# Patient Record
Sex: FEMALE | Race: WHITE | NOT HISPANIC OR LATINO | Employment: PART TIME | URBAN - METROPOLITAN AREA
[De-identification: names, ages, dates, MRNs, and addresses within clinical notes are randomized per-mention and may not be internally consistent; named-entity substitution may affect disease eponyms.]

---

## 2017-02-09 ENCOUNTER — HISTORICAL (OUTPATIENT)
Dept: URGENT CARE | Facility: CLINIC | Age: 32
End: 2017-02-09

## 2017-10-09 ENCOUNTER — HISTORICAL (OUTPATIENT)
Dept: LAB | Facility: HOSPITAL | Age: 32
End: 2017-10-09

## 2018-08-13 ENCOUNTER — HISTORICAL (OUTPATIENT)
Dept: URGENT CARE | Facility: CLINIC | Age: 33
End: 2018-08-13

## 2018-08-16 ENCOUNTER — HISTORICAL (OUTPATIENT)
Dept: RADIOLOGY | Facility: HOSPITAL | Age: 33
End: 2018-08-16

## 2018-10-09 ENCOUNTER — HISTORICAL (OUTPATIENT)
Dept: LAB | Facility: HOSPITAL | Age: 33
End: 2018-10-09

## 2018-10-15 ENCOUNTER — HISTORICAL (OUTPATIENT)
Dept: RADIOLOGY | Facility: HOSPITAL | Age: 33
End: 2018-10-15

## 2018-10-25 ENCOUNTER — HISTORICAL (OUTPATIENT)
Dept: ADMINISTRATIVE | Facility: HOSPITAL | Age: 33
End: 2018-10-25

## 2018-10-25 LAB
ABS NEUT (OLG): 6.99 X10(3)/MCL (ref 2.1–9.2)
ALBUMIN SERPL-MCNC: 4 GM/DL (ref 3.4–5)
ALBUMIN/GLOB SERPL: 1.1 RATIO (ref 1.1–2)
ALP SERPL-CCNC: 92 UNIT/L (ref 38–126)
ALT SERPL-CCNC: 59 UNIT/L (ref 12–78)
AST SERPL-CCNC: 26 UNIT/L (ref 15–37)
BASOPHILS # BLD AUTO: 0 X10(3)/MCL (ref 0–0.2)
BASOPHILS NFR BLD AUTO: 0 %
BILIRUB SERPL-MCNC: 0.6 MG/DL (ref 0.2–1)
BILIRUBIN DIRECT+TOT PNL SERPL-MCNC: 0.1 MG/DL (ref 0–0.5)
BILIRUBIN DIRECT+TOT PNL SERPL-MCNC: 0.5 MG/DL (ref 0–0.8)
BUN SERPL-MCNC: 18 MG/DL (ref 7–18)
CALCIUM SERPL-MCNC: 9.7 MG/DL (ref 8.5–10.1)
CHLORIDE SERPL-SCNC: 104 MMOL/L (ref 98–107)
CO2 SERPL-SCNC: 29 MMOL/L (ref 21–32)
CREAT SERPL-MCNC: 0.89 MG/DL (ref 0.55–1.02)
CRP SERPL HS-MCNC: 10 MG/L (ref 0–3)
EOSINOPHIL # BLD AUTO: 0.6 X10(3)/MCL (ref 0–0.9)
EOSINOPHIL NFR BLD AUTO: 5 %
ERYTHROCYTE [DISTWIDTH] IN BLOOD BY AUTOMATED COUNT: 14.6 % (ref 11.5–17)
GLOBULIN SER-MCNC: 3.8 GM/DL (ref 2.4–3.5)
GLUCOSE SERPL-MCNC: 141 MG/DL (ref 74–106)
HCT VFR BLD AUTO: 40.5 % (ref 37–47)
HGB BLD-MCNC: 12.9 GM/DL (ref 12–16)
LYMPHOCYTES # BLD AUTO: 2.2 X10(3)/MCL (ref 0.6–4.6)
LYMPHOCYTES NFR BLD AUTO: 20 %
MCH RBC QN AUTO: 25.9 PG (ref 27–31)
MCHC RBC AUTO-ENTMCNC: 31.9 GM/DL (ref 33–36)
MCV RBC AUTO: 81.2 FL (ref 80–94)
MONOCYTES # BLD AUTO: 0.9 X10(3)/MCL (ref 0.1–1.3)
MONOCYTES NFR BLD AUTO: 8 %
NEUTROPHILS # BLD AUTO: 6.99 X10(3)/MCL (ref 2.1–9.2)
NEUTROPHILS NFR BLD AUTO: 66 %
PLATELET # BLD AUTO: 156 X10(3)/MCL (ref 130–400)
PMV BLD AUTO: 12.1 FL (ref 9.4–12.4)
POTASSIUM SERPL-SCNC: 4.4 MMOL/L (ref 3.5–5.1)
PROT SERPL-MCNC: 7.8 GM/DL (ref 6.4–8.2)
RBC # BLD AUTO: 4.99 X10(6)/MCL (ref 4.2–5.4)
SODIUM SERPL-SCNC: 141 MMOL/L (ref 136–145)
WBC # SPEC AUTO: 10.7 X10(3)/MCL (ref 4.5–11.5)

## 2018-11-02 ENCOUNTER — HISTORICAL (OUTPATIENT)
Dept: URGENT CARE | Facility: CLINIC | Age: 33
End: 2018-11-02

## 2018-11-02 LAB
BILIRUB SERPL-MCNC: NEGATIVE MG/DL
BLOOD URINE, POC: NEGATIVE
CLARITY, POC UA: NORMAL
COLOR, POC UA: YELLOW
GLUCOSE UR QL STRIP: NEGATIVE
KETONES UR QL STRIP: NEGATIVE
LEUKOCYTE EST, POC UA: NORMAL
NITRITE, POC UA: NEGATIVE
PH, POC UA: 6
PROTEIN, POC: NEGATIVE
SPECIFIC GRAVITY, POC UA: 1.01
UROBILINOGEN, POC UA: NORMAL

## 2018-12-18 ENCOUNTER — HISTORICAL (OUTPATIENT)
Dept: ANESTHESIOLOGY | Facility: HOSPITAL | Age: 33
End: 2018-12-18

## 2018-12-26 LAB
BILIRUB SERPL-MCNC: NEGATIVE MG/DL
BLOOD URINE, POC: NEGATIVE
CLARITY, POC UA: CLEAR
COLOR, POC UA: YELLOW
GLUCOSE UR QL STRIP: NEGATIVE
KETONES UR QL STRIP: NEGATIVE
LEUKOCYTE EST, POC UA: NEGATIVE
NITRITE, POC UA: NEGATIVE
PH, POC UA: 5
PROTEIN, POC: NEGATIVE
SPECIFIC GRAVITY, POC UA: 1.01
UROBILINOGEN, POC UA: NORMAL

## 2019-01-07 ENCOUNTER — HISTORICAL (OUTPATIENT)
Dept: PREADMISSION TESTING | Facility: HOSPITAL | Age: 34
End: 2019-01-07

## 2019-01-17 LAB
BUN SERPL-MCNC: 16 MG/DL (ref 7–18)
CALCIUM SERPL-MCNC: 9.4 MG/DL (ref 8.5–10.1)
CHLORIDE SERPL-SCNC: 106 MMOL/L (ref 98–107)
CO2 SERPL-SCNC: 27 MMOL/L (ref 21–32)
CREAT SERPL-MCNC: 0.81 MG/DL (ref 0.55–1.02)
CREAT/UREA NIT SERPL: 19.8
ERYTHROCYTE [DISTWIDTH] IN BLOOD BY AUTOMATED COUNT: 13.7 % (ref 11.5–17)
GLUCOSE SERPL-MCNC: 153 MG/DL (ref 74–106)
HCT VFR BLD AUTO: 37.5 % (ref 37–47)
HGB BLD-MCNC: 12.1 GM/DL (ref 12–16)
MCH RBC QN AUTO: 26.6 PG (ref 27–31)
MCHC RBC AUTO-ENTMCNC: 32.3 GM/DL (ref 33–36)
MCV RBC AUTO: 82.4 FL (ref 80–94)
PLATELET # BLD AUTO: 218 X10(3)/MCL (ref 130–400)
PMV BLD AUTO: 11.6 FL (ref 9.4–12.4)
POTASSIUM SERPL-SCNC: 4.1 MMOL/L (ref 3.5–5.1)
RBC # BLD AUTO: 4.55 X10(6)/MCL (ref 4.2–5.4)
SODIUM SERPL-SCNC: 140 MMOL/L (ref 136–145)
WBC # SPEC AUTO: 7.4 X10(3)/MCL (ref 4.5–11.5)

## 2019-01-18 ENCOUNTER — HISTORICAL (OUTPATIENT)
Dept: ADMINISTRATIVE | Facility: HOSPITAL | Age: 34
End: 2019-01-18

## 2019-02-18 ENCOUNTER — HISTORICAL (OUTPATIENT)
Dept: ADMINISTRATIVE | Facility: HOSPITAL | Age: 34
End: 2019-02-18

## 2019-04-13 ENCOUNTER — HISTORICAL (OUTPATIENT)
Dept: URGENT CARE | Facility: CLINIC | Age: 34
End: 2019-04-13

## 2019-07-08 ENCOUNTER — HISTORICAL (OUTPATIENT)
Dept: RADIOLOGY | Facility: HOSPITAL | Age: 34
End: 2019-07-08

## 2019-07-23 ENCOUNTER — HISTORICAL (OUTPATIENT)
Dept: LAB | Facility: HOSPITAL | Age: 34
End: 2019-07-23

## 2021-01-23 ENCOUNTER — HISTORICAL (OUTPATIENT)
Dept: ADMINISTRATIVE | Facility: HOSPITAL | Age: 36
End: 2021-01-23

## 2021-02-04 ENCOUNTER — HISTORICAL (OUTPATIENT)
Dept: ADMINISTRATIVE | Facility: HOSPITAL | Age: 36
End: 2021-02-04

## 2021-07-12 ENCOUNTER — HISTORICAL (OUTPATIENT)
Dept: RADIOLOGY | Facility: HOSPITAL | Age: 36
End: 2021-07-12

## 2021-07-14 ENCOUNTER — HISTORICAL (OUTPATIENT)
Dept: FAMILY MEDICINE | Facility: CLINIC | Age: 36
End: 2021-07-14

## 2021-07-18 LAB
LEFT EYE DM RETINOPATHY: NEGATIVE
RIGHT EYE DM RETINOPATHY: NEGATIVE

## 2021-08-05 ENCOUNTER — HISTORICAL (OUTPATIENT)
Dept: RADIOLOGY | Facility: HOSPITAL | Age: 36
End: 2021-08-05

## 2021-08-20 ENCOUNTER — HISTORICAL (OUTPATIENT)
Dept: RADIOLOGY | Facility: HOSPITAL | Age: 36
End: 2021-08-20

## 2021-08-20 ENCOUNTER — HISTORICAL (OUTPATIENT)
Dept: ADMINISTRATIVE | Facility: HOSPITAL | Age: 36
End: 2021-08-20

## 2021-08-23 ENCOUNTER — HOSPITAL ENCOUNTER (OUTPATIENT)
Dept: MEDSURG UNIT | Facility: HOSPITAL | Age: 36
End: 2021-08-24
Attending: OTOLARYNGOLOGY | Admitting: OTOLARYNGOLOGY

## 2021-09-21 ENCOUNTER — HISTORICAL (OUTPATIENT)
Dept: ADMINISTRATIVE | Facility: HOSPITAL | Age: 36
End: 2021-09-21

## 2021-10-07 ENCOUNTER — HISTORICAL (OUTPATIENT)
Dept: SPEECH THERAPY | Facility: HOSPITAL | Age: 36
End: 2021-10-07

## 2021-11-03 ENCOUNTER — HISTORICAL (OUTPATIENT)
Dept: ADMINISTRATIVE | Facility: HOSPITAL | Age: 36
End: 2021-11-03

## 2021-11-04 ENCOUNTER — HISTORICAL (OUTPATIENT)
Dept: FAMILY MEDICINE | Facility: CLINIC | Age: 36
End: 2021-11-04

## 2021-12-10 ENCOUNTER — HISTORICAL (OUTPATIENT)
Dept: LAB | Facility: HOSPITAL | Age: 36
End: 2021-12-10

## 2021-12-20 ENCOUNTER — HISTORICAL (OUTPATIENT)
Dept: ADMINISTRATIVE | Facility: HOSPITAL | Age: 36
End: 2021-12-20

## 2021-12-21 ENCOUNTER — HISTORICAL (OUTPATIENT)
Dept: SURGERY | Facility: HOSPITAL | Age: 36
End: 2021-12-21

## 2022-01-23 ENCOUNTER — HISTORICAL (OUTPATIENT)
Dept: ADMINISTRATIVE | Facility: HOSPITAL | Age: 37
End: 2022-01-23

## 2022-01-23 LAB
FLUAV AG UPPER RESP QL IA.RAPID: NEGATIVE
FLUBV AG UPPER RESP QL IA.RAPID: NEGATIVE
SARS-COV-2 RNA RESP QL NAA+PROBE: POSITIVE

## 2022-03-21 ENCOUNTER — HISTORICAL (OUTPATIENT)
Dept: OTOLARYNGOLOGY | Facility: CLINIC | Age: 37
End: 2022-03-21

## 2022-03-21 LAB
ABS NEUT (OLG): 8.18 (ref 2.1–9.2)
ALBUMIN SERPL-MCNC: 4.1 G/DL (ref 3.5–5)
ALBUMIN/GLOB SERPL: 1.1 {RATIO} (ref 1.1–2)
ALP SERPL-CCNC: 65 U/L (ref 40–150)
ALT SERPL-CCNC: 18 U/L (ref 0–55)
AST SERPL-CCNC: 15 U/L (ref 5–34)
BASOPHILS # BLD AUTO: 0 10*3/UL (ref 0–0.2)
BASOPHILS NFR BLD AUTO: 0 %
BILIRUB SERPL-MCNC: 0.4 MG/DL
BILIRUBIN DIRECT+TOT PNL SERPL-MCNC: 0.1 (ref 0–0.5)
BILIRUBIN DIRECT+TOT PNL SERPL-MCNC: 0.3 (ref 0–0.8)
BUN SERPL-MCNC: 15.2 MG/DL (ref 7–18.7)
CALCIUM SERPL-MCNC: 9.7 MG/DL (ref 8.7–10.5)
CHLORIDE SERPL-SCNC: 103 MMOL/L (ref 98–107)
CO2 SERPL-SCNC: 27 MMOL/L (ref 22–29)
CREAT SERPL-MCNC: 0.87 MG/DL (ref 0.55–1.02)
EOSINOPHIL # BLD AUTO: 0.1 10*3/UL (ref 0–0.9)
EOSINOPHIL NFR BLD AUTO: 1 %
ERYTHROCYTE [DISTWIDTH] IN BLOOD BY AUTOMATED COUNT: 13.7 % (ref 11.5–14.5)
FLAG2 (OHS): 60
FLAG3 (OHS): 80
FLAGS (OHS): 80
GLOBULIN SER-MCNC: 3.8 G/DL (ref 2.4–3.5)
GLUCOSE SERPL-MCNC: 141 MG/DL (ref 74–100)
HCT VFR BLD AUTO: 44.3 % (ref 35–46)
HEMOLYSIS INTERF INDEX SERPL-ACNC: 4
HGB BLD-MCNC: 14.5 G/DL (ref 12–16)
ICTERIC INTERF INDEX SERPL-ACNC: 1
IMM GRANULOCYTES # BLD AUTO: 0.03 10*3/UL
IMM GRANULOCYTES NFR BLD AUTO: 0 %
LIPEMIC INTERF INDEX SERPL-ACNC: 30
LOW EVENT # SUSPECT FLAG (OHS): 80
LYMPHOCYTES # BLD AUTO: 3 10*3/UL (ref 0.6–4.6)
LYMPHOCYTES NFR BLD AUTO: 25 %
MANUAL DIFF? (OHS): NO
MCH RBC QN AUTO: 27.5 PG (ref 26–34)
MCHC RBC AUTO-ENTMCNC: 32.7 G/DL (ref 31–37)
MCV RBC AUTO: 84.1 FL (ref 80–100)
MO BLASTS SUSPECT FLAG (OHS): 40
MONOCYTES # BLD AUTO: 0.6 10*3/UL (ref 0.1–1.3)
MONOCYTES NFR BLD AUTO: 5 %
NEUTROPHILS # BLD AUTO: 8.18 10*3/UL (ref 2.1–9.2)
NEUTROPHILS NFR BLD AUTO: 68 %
NRBC BLD AUTO-RTO: 0 % (ref 0–0.2)
PLATELET # BLD AUTO: 214 10*3/UL (ref 130–400)
PLATELET CLUMPS SUSPECT FLAG (OHS): 10
PMV BLD AUTO: 11.5 FL (ref 7.4–10.4)
POTASSIUM SERPL-SCNC: 4 MMOL/L (ref 3.5–5.1)
PROT SERPL-MCNC: 7.9 G/DL (ref 6.4–8.3)
RBC # BLD AUTO: 5.27 10*6/UL (ref 4–5.2)
SODIUM SERPL-SCNC: 139 MMOL/L (ref 136–145)
T4 FREE SERPL-MCNC: 0.99 NG/DL (ref 0.7–1.48)
TSH SERPL-ACNC: 0.33 M[IU]/L (ref 0.35–4.94)
WBC # SPEC AUTO: 11.9 10*3/UL (ref 4.5–11)

## 2022-04-07 ENCOUNTER — HISTORICAL (OUTPATIENT)
Dept: ADMINISTRATIVE | Facility: HOSPITAL | Age: 37
End: 2022-04-07
Payer: MEDICAID

## 2022-04-24 VITALS
WEIGHT: 200.63 LBS | HEIGHT: 64 IN | BODY MASS INDEX: 34.25 KG/M2 | OXYGEN SATURATION: 99 % | SYSTOLIC BLOOD PRESSURE: 118 MMHG | DIASTOLIC BLOOD PRESSURE: 81 MMHG

## 2022-04-28 NOTE — H&P
Patient:   Kinza Ford            MRN: [id number]            FIN: [id number]               Age:   36 years     Sex:  Female     :  1985   Associated Diagnoses:   None   Author:   Akua Bernal MD      Pre-Op Dx:  36 F with AUB, fibroid    Plan: TVH BS cysto    Reviewed resident's H&P.  Agree with plan.  Proceed with case

## 2022-04-28 NOTE — OP NOTE
DATE OF SURGERY:    01/18/2019    SURGEON:  Lionel Khalil MD    PREOPERATIVE DIAGNOSIS:  Left renal calculus.    POSTOPERATIVE DIAGNOSIS:  Left renal calculus.    PROCEDURE PERFORMED:  Left extracorporeal shockwave lithotripsy.    PROCEDURE IN DETAIL:  After informed consent was obtained, the patient was taken to the operating room after receiving preoperative dose of antibiotics.  She was given a spinal anesthetic and placed in a supine position on the lithotripsy table.  Her stone was identified in 2 planes using fluoroscopy.  Next, she received extracorporeal shock wave lithotripsy which included a total of 2500 shocks up to a power level of 5, which was well tolerated.  There were no apparent complications.  Postprocedure fluoroscopy shows good stone fragmentation.  She was then brought to recovery room in good condition.        ______________________________  Lionel Khalil MD    WBR/[facility]  DD:  01/18/2019  Time:  07:36AM  DT:  01/18/2019  Time:  09:04AM  Job #:  [id number]

## 2022-05-01 NOTE — HISTORICAL [facility] CERNER
This is a historical note converted from [employer]ner. Formatting and pictures may have been removed.  Please reference [employer]ner for original formatting and attached multimedia. Indication for Surgery  37 yo with AUB refractory to medical management, as well as dysmenorrhea, uterine fibroid.  Preoperative Diagnosis  1. Abnormal uterine bleeding  2. Uterine fibroid  3. Dysmenorrhea  Postoperative Diagnosis  Same  Operation  Total vaginal hysterectomy, modified McCalls culdoplasty, cystoscopy  Surgeon(s)  Attending: Akua Bernal MD  Resident: Cindy Moss MD (-IV)  Assistant  Williams Reeves MD (-III)  Anesthesia  General endotracheal  Estimated Blood Loss  20 mL  Urine Output  210 mL  Findings  EUA: external female genitalia without lesion, multiparous cervix without lesion, vaginal epithelium without lesion, uterus 10 cm with excellent descent and mobility, no adnexal fullness  Intraop: uterus with 4 cm?firm, exophytic mass consistent with fibroid posterolateral to internal cervical os on the right; bilateral ovaries normal in appearance; unable to visualize remaining fallopian tube  Cystoscopy: brisk efflux of urine from bilateral ureteral orifices; bladder survey performed with no evidence of injury, defect, or suture; Hunners ulcers noted throughout bladder epithelium, no other lesions  Specimen(s)  Uterus, cervix (109g)  Complications  None  Technique  Patient was taken to the OR with IV fluids running. General anesthesia was obtained without difficulty.?Ancef?2g?was administered for infection prophylaxis. SCDs were placed on bilateral lower extremities and patient received subcutaneous heparin?for DVT prophylaxis. She was positioned in dorsal lithotomy position with feet in Yellowfin stirrups. Exam under anesthesia was performed. She was prepared in draped in the normal sterile fashion. A time out was performed.?A Benz catheter was placed to drain the bladder.  ?   Right angle retractor placed superiorly  with short weighted speculum into the posterior vagina. The cervix was visualized and grasped with two Sue thyroid clamps along the anterior?and posterior lip of the cervix. The cervicovaginal junction was visualized. Vasopressin (20 units diluted in 30 mL normal saline) was then circumferentially injected into the cervix and massaged. A circumferential?incision was made?along the?cervicovaginal junction?with?a?#10?blade scalpel.?With use of a ray-elsi sponge,?gentle blunt dissection was then performed to gently push back vaginal epithelium.?Posterior colpotomy made sharply with curved Mayos without difficulty. Following entry, the posterior cul de sac was digitally explored with no injury or?lesions?noted. 4 cm firm, exophytic uterine mass palpable posterolateral to cervix on the right at the level of the internal os.?Return of clear peritoneal fluid was noted.?One interrupted suture of #0 Vicryl was placed to affix the posterior peritoneum to the posterior vaginal mucosa and suture tied to long weighted speculum. The bilateral uterosacral ligaments were identified and clamped with curved Vasile clamp,?cut, suture ligated with 0-Vicryl.?Sutures were kept long and clamped with curved hemostats for use for traction throughout the case. Attention was then turned to the anterior. Careful sharp dissection performed in the pubocervical fascia but unable to enter peritoneum. The bilateral?cardinal ligaments were then sequentially clamped,?desiccated, and transected using the Enseal device.?Attention then turned back to anterior peritoneum and?entered successfully with Metzenbaum scissors. Anterior cul?de sac evaluated with no evidence of injury. At this time the aforementioned uterine mass was visible and appearance noted to be consistent with a fibroid. Uterine vessels noted to be inserting at level of internal cervical os which was superior to the fibroid. The bilateral uterine vessels were then clamped,?desiccated, and  transected with the Enseal device. Portion of the broad ligament bilaterally also clamped, desiccated, and transected with the Enseal.  ?   At this point in case,?we had inadequate visualized of cornual structures to be able to safely access.?Posterior aspect of uterine fundus grasped with Sue clamp and delivered through the vaginal introitus as cervix pushed anteriorly into peritoneal cavity. Cornual structures were then identified and the Enseal device was used to clamp, desiccate, and transect first the uteroovarian ligament on the right. Enseal device then used to clamp, desiccate remaining cornual structures on the right. Attention then turned to the left where cornual structures were?also clamped, desiccated, and transected with the Enseal?device. Uterus and cervix then removed and sent for pathology.?Pedicles then examined and small amount of bleeding noted from level of right uterine pedicle. Area grasped with Allis clamp and figure of eight placed with 0-Vicryl. Hemostasis then noted. We then attempted salpingectomy but were unable to visualize patients remaining fallopian tube. Pedicles again examined and area of bleeding noted at level of uterine pedicle on left. Grasped with Allis clamp and figure of eight placed using 0-Vicryl. Small amount of bleeding still noted and hemostasis was achieved with the Enseal device. Pedicles then examined again and noted to be hemostatic.  ?   Attention was then turned to the modified McCalls culdoplasty and vaginal cuff closure. A right angle retractor was placed anteriorly along with a short weighted metal speculum for visualization of the posterior vaginal cuff.?With use of a?Mcadams needle each individual 0 Vicryl suture on?the right uterosacral ligament was used to incorporate the vaginal cuff including the peritoneum?posteriorly to pexy the lateral cuff edge to the ipsilateral uterosacral ligament. This was repeated on the left side, with Mcadams needle used to pexy  the left lateral cuff edge to the ipsilateral uterosacral ligament. The remainder of the vaginal cuff was closed with?multiple figure of eight sutures using 0-Vicryl. The tagged uterosacral suture was then tied?with?its ipsilateral suture in order to elevate the vaginal apical support?at the level of the uterosacral ligaments bilaterally. Hemostasis with reapproximation confirmed. No visible or palpable defects were?noted.  ?  Cystoscopy was then performed. The cystoscope was introduced into the bladder without difficulty. There was no noted trauma or injury on bladder survey. There was brisk efflux of urine from bilateral ureteral orifices. Bladder was partially emptied and all instruments removed. All instruments removed from the vagina. The patient tolerated the procedure well. ?The needle, sponge and instrument counts were correct x2. Patient went to the recovery?room in stable condition.?  Dr. Bernal was scrubbed and present for the entire procedure.  ?  Cindy Moss MD  [facility], PGY-4  ?  I was present with the resident during all critical and key portions of the procedure and agree with the findings documented in the residents note.  Dr. JILL Bernal MD

## 2022-05-01 NOTE — HISTORICAL [facility] CERNER
This is a historical note converted from [employer]ner. Formatting and pictures may have been removed.  Please reference [employer]ner for original formatting and attached multimedia. Admit and Discharge Dates  Admit Date: 08/23/2021  Discharge Date: 08/24/2021  Physicians  Attending Physician - Narciso SOSA, Ced MARTINEZ  Admitting Physician - Wilner Bryan  Primary Care Physician - Fely Ko  Discharge Diagnosis  Papillary thyroid cancer  Surgical Procedures  08/23/2021 - [id number] - Thyroidectomy (ENT)  Immunizations  No immunizations recorded for this visit.  Admission Information  35-year-old female with history of left thyroid nodule FNA positive for papillary. ?On CT noted to have nodules on the right side as well.? Patient is single and decision was made to undergo total thyroidectomy.? On postop day one?patient?pain was controlled with oral regimen complaining of minimal?sore throat. ?Denied any changes to her voice and felt like it was normal.? However she did complain of right shoulder pain that was likely secondary to positioning. ?This was discussed in depth with  and wife?about origin of shoulder pain and duration of course.? Patient also with good appetite and tolerating diet.??On postop labs PTH?was 125,?calcium?was between 8.9 and nine. ?Denies any numbness or tingling around her lips or her fingers. ?Synthroid was started.??Patient discharged home on postop?day one?with Synthroid?and Norco?7.5.? Also recommend lidocaine patches?for right shoulder. Julio in place with appropriate drainage, to be removed in 2 days  Significant Findings  None  Time Spent on discharge  <30min  Objective  Vitals & Measurements  T:?36.3? ?C (Oral)? TMIN:?36.2? ?C (Temporal Artery)? TMAX:?37.9? ?C (Temporal Artery)? HR:?67(Peripheral)? RR:?18? BP:?148/91? SpO2:?99%?  Physical Exam  General: AAO NAD, strong but slightly rough  Neuro: CN II - XII grossly intact  Head/ Face: AT NC, symmetric, sensations intact  bilaterally  Eye: EOMI PERRLA  Ears: externally normal with grossly normal hearing  Nose: bilateral nares patent  OC/OP: MMM, dentition is moderate  Neck: soft, supple, no LAD, normal ROM, incision c/d/i with steris in place. LORENA with sanguinous drainage  Respiratory: nonlabored, no stridor  Cardiovascular: RRR  MSK: Good range of active and passive R shoulder although with pain, good  strength  Patient Discharge Condition  Good  Discharge Disposition  Good   Discharge Medication Reconciliation  Discharge Med Rec is not complete  Car Seat Challenge  No Qualifying Data

## 2022-05-01 NOTE — HISTORICAL [facility] CERNER
This is a historical note converted from [employer]. Formatting and pictures may have been removed.  Please reference Hanane for original formatting and attached multimedia. H&P Interval?Update  ?   Ms. Ford?presents for scheduled TVH, BS, cystoscopy  ?   She is able to describe planned procedure in her own words.  No new medical problems or medications.  ?   After procedure we have her permission to contact her  Adam?at [phone]  ?  ?Event Name? Event Result? Date/Time?   Temperature Oral 36.5 DegC 21 08:03:54   Peripheral Pulse Rate 80 bpm 21 08:03:54   SpO2 96 % 21 08:03:54   Systolic Blood Pressure 119 mmHg 21 08:03:54   Diastolic Blood Pressure 83 mmHg 21 08:03:54   Mean Arterial Pressure, Cuff 95 mmHg 21 08:03:54   ?  ?  ?   GEN: NAD, A&O  Card: RRR  Pulm: CTAB  Abdomen: Soft, non-tender, no rebound or guarding  Ext: Calves?non-tender bilaterally  ?  Arnold Reeves MD?  [facility], PGY3  ?  ?  Chief Complaint  ?  pre-op  History of Present Illness  36 Years? with AUB-L,?dysmenorrhea, who presents for surgical discussion of TVH, BS, cystoscopy  ?  Has had heavy menstrual bleeding since 2018 which was her last c section. Regular cycles but passing clots, changing pads q2-3 hours. Has failed medical management with depo provera, OCPs, and patches. Currently on Provera 10 mg BID. Also notes dysmenorrhea, uncontrolled with nsaids.  ?  OBGYN hx:   x 4  C/S x 1  ruptured Ectopic pregnancy (); SAB x 1  last pap 2019 NIL, HPV neg  ?  PMH: Asthma (has not had episodes for several years, not using any inhalers), T2DM (last A1c 6.4%), HLD, Thyroid cancer s/p thyroidectomy (2021) now on levothyroxine, kidney stones,?Obesity (BMI 36)  ?  PSH: C/S x1, BTL, salpingectomy, wisdom teeth, thyroidectomy, sinus surgery, lithotripsy, ureteral stent placement.  Fhx: maternal GM with uterine cancer, denies breast, ovarian or colon cancer  Social hx: occasional ETOH, denies  tobacco or illicit drug use.  Allergies: sulfa drugs  ?  Works as a dai/homemaker, lives at home with  and children, good support  Gynecological History  Last Menstrual Period: 11/19/21  Menstrual Status Intake: Menarcheal  Review of Systems  The patient denies the following:?? (positive ROS is highlighted)  Constitutional:? weight loss, weight gain, fever/chills, night sweats, excessive fatigue  Endocrine: heat or cold intolerance, excessive thirst, abnormal hair growth?  Eyes, Ears, Nose & Throat: visual problems, hearing problems, nose bleeds, sinus problems, sore throat  Gastrointestinal: frequent heartburn, abdominal pain, blood in stools, diarrhea, constipation  Hematologic: easy bruising, bleeding gums, prolonged bleeding, clotting problems  Cardiovascular: chest pain, palpitations, trouble breathing when lying flat  Respiratory:?shortness of breath, chronic cough, wheezing  Skin: itching,?rash, new moles or lesions  Psychosocial:? difficulty sleeping, anxiety or panic attacks,? depression  Gynecologic: see HPI  Urinary:?stress incontinence, urge incontinence, hematuria, frequency, urgency, dysuria, difficulty urinating,?bulge in vagina  Neurologic: headaches, dizziness, memory loss.  Musculoskeletal:?joint?stiffness,?joint?pain/swelling,?back pain.  Physical Exam  General: NAD, A/Ox3. Well appearing.?  Respiratory: CTAB  Cardiovascular: RRR  Abdomen: soft, nondistended, nontender to palpation  Incisions: well healed pfannensteil incision.  Extremities: no edema, no calf tenderness  ?  Exam per Dr. Cruz  External genitalia: Normal female anatomy, no masses/lesions.  Speculum exam: vaginal mucosa normal in appearance. Pink. No masses/lesions. Cervix well visualized, smooth in contour no masses or lesions. No blood or discharge.  Bimanual exam: Vagina with adequate?capacity. Anteflexed uterus 8cm in size, no cervical motion tenderness. Smooth in contour, no masses. Good descent and mobility. No  adnexal fullness/tenderness.? [1]  ?  Assessment/Plan  1.?Abnormal uterine bleeding?N93.9  ?  2.?Uterine fibroid?D25.9  ?  3.?Dysmenorrhea?N94.6  ?  4.?Pre-op examination?Z01.818  ??37yo?F scheduled for TVH,BS, cystoscopy. ?  Posterior fibroid noted on US, discussed possibility of having to proceed with laparoscopy if difficulty with posterior fibroid or adhesive disease ntoed vaginally.  ?  ??This procedure and its risks, reason, benefits, and complications were reviewed in detail. Complications discussed included injury to bowel, bladder, major blood vessels, ureter, bleeding, possible need for blood transfusion, infection, scarring, dyspareunia, further surgery (laparotomy), worsening incontinence, failure of procedure, or fistula formation. Patient amenable to blood transfusion if needed.  ?  ??Alternatives to this planned procedure were explained to the patient including expectant, medical, and other types of surgical management.  ?  Risks that are specific to this patient were also discussed including acquisition of COVID 19. Increased risk of bladder/ureteral injury 2/2 hx of C/S and ruptured ectopic pregnancy surgery.?  ?  ???Additionally, ovarian conservation versus elective risk reducing ovarian resection were discussed with the patient. She understands the risk for reoperation for ovarian etiology to be 5-10%, the risk for ovarian cancer in women to be 1 in 70, and the protective effects of ovarian hormones including cardiovascular and bone health. She desires to proceed with ovarian conservation if possible. Discussed plan for ovaries to remain in place baring pathology necessitating removal.  ?  Patient understands we are affiliated with a teaching institution and that residents and medical students will be involved in her case. She has consented to a pelvic?exam under anesthesia and? understands that medical students participate in this portion of the procedure. Surgical consents were signed and  witnessed.  ?  We reviewed the typical perioperative course, anticipation of same day discharge home. Instructions reviewed, including NPO after midnight prior to surgery. Post-operative precautions were reviewed including no heavy lifting >10 lbs and nothing per vagina for 6 weeks.?  ?  ?PACE appointment requested.  Consents reviewed with patient and signed.  Labs today: none  Labs DOS: T&S, UPT  Meds DOS: levothyroxine, provera, wellbutrin, gabapentin  Caprini score?5;?high risk,?heparin and?SCDs for DVT prophylaxis  Abx: Ancef  Discussed?outpatient surgery expectations and recovery time.  ?  ??To OR for?TVH, BS, cysto?on 12/21/21  f/u 2 weeks post-op  ?  ??Arnold Reeves MD?  [facility], PGY3  [1]  [1]?[facility] Pre-op Note; Arnold Reeves MD 12/20/2021 09:00 CST

## 2022-05-26 PROBLEM — F43.23 ADJUSTMENT DISORDER WITH MIXED ANXIETY AND DEPRESSED MOOD: Status: ACTIVE | Noted: 2022-05-26

## 2022-05-28 ENCOUNTER — HOSPITAL ENCOUNTER (EMERGENCY)
Facility: HOSPITAL | Age: 37
Discharge: HOME | End: 2022-05-28
Attending: EMERGENCY MEDICINE
Payer: MEDICAID

## 2022-05-28 VITALS
WEIGHT: 190 LBS | SYSTOLIC BLOOD PRESSURE: 121 MMHG | DIASTOLIC BLOOD PRESSURE: 90 MMHG | HEART RATE: 76 BPM | RESPIRATION RATE: 18 BRPM | HEIGHT: 64 IN | TEMPERATURE: 99 F | BODY MASS INDEX: 32.44 KG/M2 | OXYGEN SATURATION: 98 %

## 2022-05-28 DIAGNOSIS — M79.606 LEG PAIN: ICD-10-CM

## 2022-05-28 DIAGNOSIS — R52 PAIN: Primary | ICD-10-CM

## 2022-05-28 DIAGNOSIS — M25.561 CHRONIC PAIN OF RIGHT KNEE: ICD-10-CM

## 2022-05-28 DIAGNOSIS — G89.29 CHRONIC PAIN OF RIGHT KNEE: ICD-10-CM

## 2022-05-28 PROCEDURE — 99284 EMERGENCY DEPT VISIT MOD MDM: CPT | Mod: 25

## 2022-05-28 PROCEDURE — 63600175 PHARM REV CODE 636 W HCPCS: Performed by: PHYSICIAN ASSISTANT

## 2022-05-28 PROCEDURE — 96372 THER/PROPH/DIAG INJ SC/IM: CPT | Performed by: PHYSICIAN ASSISTANT

## 2022-05-28 RX ORDER — KETOROLAC TROMETHAMINE 30 MG/ML
30 INJECTION, SOLUTION INTRAMUSCULAR; INTRAVENOUS
Status: COMPLETED | OUTPATIENT
Start: 2022-05-28 | End: 2022-05-28

## 2022-05-28 RX ORDER — NAPROXEN 500 MG/1
500 TABLET ORAL 2 TIMES DAILY WITH MEALS
Qty: 14 TABLET | Refills: 0 | Status: SHIPPED | OUTPATIENT
Start: 2022-05-28 | End: 2022-06-04

## 2022-05-28 RX ADMIN — KETOROLAC TROMETHAMINE 30 MG: 30 INJECTION, SOLUTION INTRAMUSCULAR at 11:05

## 2022-05-28 NOTE — ED NOTES
Pt given crutches and knee immobilizer placed on R leg. Educated pt on application/ removal of knee immobilizer, how to adjust crutches, proper technique of crutches, and how to adjust crutches height. Pt demonstrates and verbalizes understanding. Pt ambulates with crutches independently with no complications. All questions answered.

## 2022-05-28 NOTE — FIRST PROVIDER EVALUATION
"Medical screening exam completed.  I have conducted a focused provider triage encounter, findings are as follows:    Brief history of present illness:  36 year old female presents to ED for right knee pain after twisting in last night    Vitals:    05/28/22 1141   BP: (!) 148/87   Pulse: 98   Resp: 16   Temp: 98.6 °F (37 °C)   TempSrc: Oral   SpO2: 98%   Weight: 86.2 kg (190 lb)   Height: 5' 4" (1.626 m)       Pertinent physical exam:  Awake, alert    Brief workup plan:  X-ray knee right     Preliminary workup initiated; this workup will be continued and followed by the physician or advanced practice provider that is assigned to the patient when roomed.  "

## 2022-05-28 NOTE — ED NOTES
Pt c/o 8/10 R knee pain with bending. Pt states she has been experiencing this pain for some time now. Pt walks independently with steady gait. Pt able to move R lower extremity strong and equal to L leg. Pt has no edema. Pt has +2 pedal pulses with sensation intact bilat. Pt denies numbness/ tingling, weakness, and/ or SOB.

## 2022-05-28 NOTE — ED PROVIDER NOTES
Encounter Date: 5/28/2022       History     Chief Complaint   Patient presents with    Knee Pain     C/o right knee pain acute on chronic injury, worse since pulling/twisting last night. Neurovascular intact.     36 year old male presents to ED for right knee pain. Patient reports she twisted her right knee last night at work. Patient states lateral right knee pain for the past several months with worsening symptoms patient denies chest pain, shortness of breath, and fever.        Review of patient's allergies indicates:   Allergen Reactions    Adhesive      Other reaction(s): Burn    Sulfa (sulfonamide antibiotics)      Other reaction(s): Unknown    Sulfamethoxazole      Other reaction(s): Rash     No past medical history on file.  No past surgical history on file.  No family history on file.     Review of Systems   Constitutional: Negative for fever.   HENT: Negative.    Eyes: Negative.    Respiratory: Negative for shortness of breath.    Cardiovascular: Negative for chest pain.   Gastrointestinal: Negative for abdominal pain, nausea and vomiting.   Endocrine: Negative.    Genitourinary: Negative.    Musculoskeletal: Positive for arthralgias. Negative for joint swelling.   Skin: Negative.    Allergic/Immunologic: Negative.    Neurological: Negative.    Hematological: Negative.    Psychiatric/Behavioral: Negative.        Physical Exam     Initial Vitals [05/28/22 1141]   BP Pulse Resp Temp SpO2   (!) 148/87 98 16 98.6 °F (37 °C) 98 %      MAP       --         Physical Exam    Constitutional: She appears well-developed and well-nourished.   HENT:   Head: Normocephalic and atraumatic.   Eyes: EOM are normal. Pupils are equal, round, and reactive to light.   Neck: Neck supple.   Normal range of motion.  Cardiovascular: Normal rate and regular rhythm.   Pulmonary/Chest: Breath sounds normal.   Musculoskeletal:      Cervical back: Normal range of motion and neck supple.      Comments: Tenderness to palpation of  right lateral anterior knee, posterior knee, and calf; no warmth or swelling noted; reports painful range of motion; 2+ DP pulse, cap refill normal     Neurological: She is alert and oriented to person, place, and time.   Skin: Skin is warm and dry.   Psychiatric: She has a normal mood and affect.         ED Course   Procedures  Labs Reviewed - No data to display       Imaging Results          X-Ray Knee Complete 4 Or More Views Right (Final result)  Result time 05/28/22 11:58:28    Final result by Ja Lucio MD (05/28/22 11:58:28)                 Impression:      No acute osseous abnormality of the right knee.      Electronically signed by: Ja Lucio  Date:    05/28/2022  Time:    11:58             Narrative:    EXAMINATION:  XR KNEE COMP 4 OR MORE VIEWS RIGHT    CLINICAL HISTORY:  Pain, unspecified    TECHNIQUE:  AP, oblique, and lateral views of the right knee.    COMPARISON:  Right knee radiographs 03/08/2022.    FINDINGS:  No acute fracture or malalignment is identified of the right knee. There is no suprapatellar joint effusion of the right knee. The joint spaces of the right knee are maintained. There is no aggressive-appearing bone lesion or abnormal periosteal reaction. No soft tissue gas or calcification. Bone mineralization is maintained.                               US Venous Right Lower Extremity-Negative for DVT   Medications   ketorolac injection 30 mg (30 mg Intramuscular Given 5/28/22 1159)     Medical Decision Making:   ED Management:  Patient in no acute distress. Patient non-toxic in appearance, afebrile. Discussed with patient results.  Anti-inflammatory medication given to patient in the ED.  knee immobilizer placed and crutches given in ED. Discussed with patient use of anti-inflammatories medications as needed.  Discussed with patient need for follow-up with Ortho and PCP.  Referral sent to [facility].  Discussed with patient ED precautions.  Patient comfortable with plan of care  and discharge.  All questions answered.                      Clinical Impression:   Final diagnoses:  [R52] Pain (Primary)  [M79.606] Leg pain  [M79.606] Leg pain - posterior right knee pain  [M25.561, G89.29] Chronic pain of right knee          ED Disposition Condition    Discharge Stable        ED Prescriptions     Medication Sig Dispense Start Date End Date Auth. Provider    naproxen (NAPROSYN) 500 MG tablet Take 1 tablet (500 mg total) by mouth 2 (two) times daily with meals. for 7 days 14 tablet 5/28/2022 6/4/2022 Arthur Metz PA-C        Follow-up Information     Follow up With Specialties Details Why Contact Info    [facility]   [address] [facility] [id number] [phone]           Arthur Metz PA-C  05/29/22 2124

## 2022-06-01 DIAGNOSIS — M25.561 RIGHT KNEE PAIN: Primary | ICD-10-CM

## 2022-06-01 DIAGNOSIS — M79.643 HAND PAIN: Primary | ICD-10-CM

## 2022-06-20 ENCOUNTER — OFFICE VISIT (OUTPATIENT)
Dept: BEHAVIORAL HEALTH | Facility: CLINIC | Age: 37
End: 2022-06-20
Payer: MEDICAID

## 2022-06-20 VITALS
OXYGEN SATURATION: 97 % | BODY MASS INDEX: 33.7 KG/M2 | RESPIRATION RATE: 18 BRPM | HEIGHT: 64 IN | WEIGHT: 197.38 LBS | TEMPERATURE: 98 F | HEART RATE: 66 BPM | DIASTOLIC BLOOD PRESSURE: 81 MMHG | SYSTOLIC BLOOD PRESSURE: 120 MMHG

## 2022-06-20 DIAGNOSIS — F43.23 ADJUSTMENT DISORDER WITH MIXED ANXIETY AND DEPRESSED MOOD: Primary | ICD-10-CM

## 2022-06-20 PROCEDURE — 99214 OFFICE O/P EST MOD 30 MIN: CPT | Mod: PBBFAC,PN | Performed by: STUDENT IN AN ORGANIZED HEALTH CARE EDUCATION/TRAINING PROGRAM

## 2022-06-20 PROCEDURE — 99213 OFFICE O/P EST LOW 20 MIN: CPT | Mod: S$PBB,,, | Performed by: STUDENT IN AN ORGANIZED HEALTH CARE EDUCATION/TRAINING PROGRAM

## 2022-06-20 PROCEDURE — 99213 PR OFFICE/OUTPT VISIT, EST, LEVL III, 20-29 MIN: ICD-10-PCS | Mod: S$PBB,,, | Performed by: STUDENT IN AN ORGANIZED HEALTH CARE EDUCATION/TRAINING PROGRAM

## 2022-06-20 RX ORDER — BUPROPION HYDROCHLORIDE 300 MG/1
300 TABLET ORAL DAILY
COMMUNITY
Start: 2022-02-24 | End: 2022-07-28

## 2022-06-20 RX ORDER — FLUTICASONE PROPIONATE 50 MCG
SPRAY, SUSPENSION (ML) NASAL
COMMUNITY
End: 2022-11-21

## 2022-06-20 RX ORDER — SPIRONOLACTONE 50 MG/1
TABLET, FILM COATED ORAL
COMMUNITY
End: 2023-06-28

## 2022-06-20 RX ORDER — MELOXICAM 15 MG/1
15 TABLET ORAL DAILY PRN
COMMUNITY
Start: 2022-03-08 | End: 2022-12-27

## 2022-06-20 RX ORDER — LEVOTHYROXINE SODIUM 125 UG/1
125 TABLET ORAL
COMMUNITY
Start: 2022-03-23 | End: 2022-07-28 | Stop reason: SDUPTHER

## 2022-06-20 RX ORDER — CETIRIZINE HYDROCHLORIDE 10 MG/1
TABLET ORAL
COMMUNITY

## 2022-06-20 RX ORDER — GABAPENTIN 300 MG/1
CAPSULE ORAL
COMMUNITY
Start: 2021-12-29 | End: 2022-12-27

## 2022-06-20 RX ORDER — MONTELUKAST SODIUM 10 MG/1
10 TABLET ORAL
COMMUNITY
Start: 2022-03-09 | End: 2022-07-07

## 2022-06-20 RX ORDER — ACETAMINOPHEN 500 MG
TABLET ORAL
COMMUNITY
End: 2023-01-18

## 2022-06-20 RX ORDER — BUSPIRONE HYDROCHLORIDE 15 MG/1
15 TABLET ORAL 2 TIMES DAILY
COMMUNITY
Start: 2022-02-24 | End: 2022-09-20 | Stop reason: SDUPTHER

## 2022-06-20 RX ORDER — METFORMIN HYDROCHLORIDE 500 MG/1
1000 TABLET, EXTENDED RELEASE ORAL 2 TIMES DAILY
COMMUNITY
Start: 2022-02-20 | End: 2023-06-28

## 2022-06-20 RX ORDER — FLUOXETINE HYDROCHLORIDE 20 MG/1
20 CAPSULE ORAL DAILY
COMMUNITY
Start: 2022-04-12 | End: 2022-09-20 | Stop reason: SDUPTHER

## 2022-06-20 RX ORDER — HYDROXYZINE PAMOATE 25 MG/1
CAPSULE ORAL
COMMUNITY
Start: 2022-05-27 | End: 2022-09-20 | Stop reason: SDUPTHER

## 2022-06-20 RX ORDER — DAPAGLIFLOZIN 10 MG/1
TABLET, FILM COATED ORAL
COMMUNITY
Start: 2022-02-24 | End: 2022-08-30 | Stop reason: SDUPTHER

## 2022-06-20 NOTE — PROGRESS NOTES
Outpatient Psychiatry Follow-Up Visit    6/20/2022    Clinical Status of Patient:  Outpatient (Ambulatory)    Chief Complaint:  Kinza Ford is a 36 y.o. female who presents today for follow-up of depression and anxiety. Patient last seen for follow-up on 3/21/2022. Met with patient.      Interval History and Content of Current Session:  Interim Events/Subjective Report/Content of Current Session:   Pt reports doing well since last visit.  Notes being under stress due to sharing vehicle with son and  [place].  Denies interim problems since last visit.  Mood stable.  Anxiety at baseline.  Denies problems with sleep or appetite.  Denies SI/HI/AVH/paranoia.  Denies SE from current regimen (other than GI SE from thyroid medication).  Pt happy with current regimen and wants to continue.  Discussed pt's repeated cancellations over past 1-2 months and no show on 5/26/2022.  Discussed importance of keeping her appointments and notifying clinic staff as soon as possible about need to reschedule.  Pt voiced understanding.    Psychiatric Review of Systems-is patient experiencing or having changes in  Sleep: better  Appetite: good  Weight: difficult to maintain stable weight  Energy: good  Motivation: good  Libido: denies problem  Anxiety: good  Guilt/hopelessness: denies  Concentration: good   Irritability: stable  Paranoia/delusions: denies  SIB/risky behavior: denies    Review of Systems   · PSYCHIATRIC: Pertinant items are noted in the narrative.  · CONSTITUTIONAL: No weight gain or loss.  · MUSCULOSKELETAL: No pain or stiffness of the joints.  · NEUROLOGIC: No weakness, sensory changes, seizures, confusion, memory loss, tremor or other abnormal movements.  · RESPIRATORY: No shortness of breath.  · CARDIOVASCULAR: No tachycardia or chest pain.  · GASTROINTESTINAL: No nausea, vomiting, pain, constipation or diarrhea.    Past Medical, Family and Social History: The patient's past medical,  "family and social history have been reviewed and updated as appropriate within the electronic medical record - see encounter notes.    Compliance: good    Side effects: SE of diarrhea from thyroid medication    Risk Parameters:  Patient reports no suicidal ideation  Patient reports no homicidal ideation  Patient reports no self-injurious behavior  Patient reports no violent behavior    Exam (detailed: at least 9 elements; comprehensive: all 15 elements)   Constitutional  Vitals:  Most recent vital signs, dated less than 90 days prior to this appointment, were reviewed.   Vitals:    06/20/22 1521   BP: 120/81   Pulse: 66   Resp: 18   Temp: 97.6 °F (36.4 °C)   SpO2: 97%   Weight: 89.5 kg (197 lb 6.4 oz)   Height: 5' 4" (1.626 m)          General:   Constitutional: No acute distress, appears stated age, casually dressed    Neurologic:   Motor: moves all extremities spontaneously and without difficulty, no abnormal involuntary movements observed  Gait: normal gait and station    Mental status examination:   Appearance: appears stated age, casually dressed, no acute distress  Behavior: unremarkable for situation, calm and cooperative  Mood: "good"  Affect: mood congruent and constricted  Thought process: linear and goal directed  Associations: appropriate for conversation  Thought content: no plan or desire for self harm or harm to others, denies paranoia, no delusional ideation volunteered  Perceptions: denies hallucinations or other altered perceptions  Orientation: oriented to day of week, month, year, location and situation  Language: English, fluid  Attention: able to attend to interview  Insight: good  Judgement: good    PHQ9 6/20/2022   Total Score 2     GAD7 6/20/2022   1. Feeling nervous, anxious, or on edge? 2   2. Not being able to stop or control worrying? 0   3. Worrying too much about different things? 2   4. Trouble relaxing? 2   5. Being so restless that it is hard to sit still? 0   6. Becoming easily " annoyed or irritable? 3   7. Feeling afraid as if something awful might happen? 3   8. If you checked off any problems, how difficult have these problems made it for you to do your work, take care of things at home, or get along with other people? 1   ANN-7 Score 12     Assessment and Diagnosis   Status/Progress: Based on the examination today, the patient's problem(s) is/are well controlled.  New problems have not been presented today.   Co-morbidities and Lack of compliance are not complicating management of the primary condition.       General Impression:    ICD-10-CM ICD-9-CM   1. Adjustment disorder with mixed anxiety and depressed mood  F43.23 309.28       Intervention/Counseling/Treatment Plan   · Continue buspirone 15mg tid  · Continue wellbutrin XL 300mg daily  · Continue prozac 20mg daily  · Continue hydroxyzine 25mg bid prn anxiety or insomnia  · From 3/21/2022, CBC and CMP wnl, TSH wnl  No need for PEC as pt is not an imminent danger to self or others or gravely disabled due to acute psychiatric illness  Discussed that pt should either call clinic for psychiatric crisis symptoms or present to nearest emergency room    Discussed with patient informed consent including diagnosis, risks and benefits of proposed treatment above vs. alternative treatments vs. no treatment, as well as serious and common side effects of these treatments, and the inherent unpredictability of individual responses to these treatments. The patient expresses understanding of the above and displays the capacity to agree with this current plan. Patient also agrees that, currently, the benefits outweigh the risks and would like to pursue treatment at this time, and had no other questions.    Instructions:  · Take all medications as prescribed.    · Abstain from recreational drugs and alcohol.  · Present to ED or call 911 for SI/HI plan or intent, psychosis, or medical emergency.    Return to Clinic: Follow up in about 3 months (around  9/20/2022).    Total time: Total time spent with patient 20 minutes with >50% spent on counseling/coordination of care.     Dao Tillman MD  [facility]

## 2022-06-22 DIAGNOSIS — M25.561 RIGHT KNEE PAIN, UNSPECIFIED CHRONICITY: Primary | ICD-10-CM

## 2022-07-14 ENCOUNTER — OFFICE VISIT (OUTPATIENT)
Dept: ORTHOPEDICS | Facility: CLINIC | Age: 37
End: 2022-07-14
Payer: MEDICAID

## 2022-07-14 VITALS
DIASTOLIC BLOOD PRESSURE: 61 MMHG | HEIGHT: 64 IN | HEART RATE: 75 BPM | RESPIRATION RATE: 16 BRPM | SYSTOLIC BLOOD PRESSURE: 97 MMHG | BODY MASS INDEX: 34.66 KG/M2 | WEIGHT: 203 LBS | TEMPERATURE: 98 F

## 2022-07-14 DIAGNOSIS — M76.31 ILIOTIBIAL BAND SYNDROME OF RIGHT SIDE: ICD-10-CM

## 2022-07-14 DIAGNOSIS — E66.9 CLASS 1 OBESITY WITH BODY MASS INDEX (BMI) OF 33.0 TO 33.9 IN ADULT, UNSPECIFIED OBESITY TYPE, UNSPECIFIED WHETHER SERIOUS COMORBIDITY PRESENT: ICD-10-CM

## 2022-07-14 DIAGNOSIS — S83.241A TEAR OF MEDIAL MENISCUS OF RIGHT KNEE, CURRENT, UNSPECIFIED TEAR TYPE, INITIAL ENCOUNTER: Primary | ICD-10-CM

## 2022-07-14 PROCEDURE — 99214 OFFICE O/P EST MOD 30 MIN: CPT | Mod: PBBFAC

## 2022-07-14 NOTE — PROGRESS NOTES
Subjective:          Chief Complaint: Kinza Ford is a 36 y.o. female who had concerns including Pain of the Right Knee and Follow-up.    HPI  35 y/o female presents to clinic complaining of right knee pain.     Patient reports that she suffered an injury to her right medial knee pain following a twisting injury and was placed in a knee immobilizer and given crutches. No longer using any assitive walking device. Patient reports previous lateral knee pain prior at [facility] when she was seen in 3/8/22 and diagnosed with ITB syndrome. Patient was prescribed PT, given mobic at that time and now ITB syndrome has now resolved.     Onset: 6 weeks   Location: medial knee   Current pain level:  5/10 moderate  and quality described as Sharp that is Improved with anti-inflammatory medications.   Modifying factors: Worse with activity and improved on rest. Stiffness worsened with activity and immobilization   Previous Injury: as per hpi    Previous Tx: PT and Mobic   Associated Sxs: No crepitus grinding No numbness and tingling No weakness. No skin changes. No Swelling. No Decreased ROM   Activity level: Light to moderate activity    Family Hx: OA     ROS  As per hpi               Objective:      Vitals:    07/14/22 0820   BP: 97/61   Pulse: 75   Resp: 16   Temp: 97.7 °F (36.5 °C)         General: Kinza is well-developed, well-nourished, appears stated age, in no acute distress, alert and oriented to time, place and person.         General Musculoskeletal Exam   Gait: normal       Right Knee Exam     Tenderness   The patient is tender to palpation of the medial joint line.    Range of Motion   The patient has normal right knee ROM.    Tests   Meniscus   Cindy:  Medial - negative Lateral - negative  Ligament Examination Lachman: normal (-1 to 2mm) PCL-Posterior Drawer: normal (0 to 2mm)     MCL - Valgus: normal (0 to 2mm)  LCL - Varus: normal  Posterior Sag Test: negative  Patella   Patellar apprehension:  negative  Patellar Tracking: normal  Patellar Grind: negative  J-Sign: none    Other   Sensation: normal    Left Knee Exam   Left knee exam is normal.    Range of Motion   The patient has normal left knee ROM.    Tests   Meniscus   Cindy:  Medial - negative Lateral - negative  Stability Lachman: normal (-1 to 2mm) PCL-Posterior Drawer: normal (0 to 2mm)  MCL - Valgus: normal (0 to 2mm)  LCL - Varus: normal (0 to 2mm)  Posterior Sag Test: negative  Patella   Patellar apprehension: negative  Patellar Tracking: normal  Patellar Grind: negative  J-Sign: J sign absent    Other   Sensation: normal    Right Hip Exam     Tests   Gemini: negative  Left Hip Exam     Tests   Gemini: negative          Muscle Strength   Right Lower Extremity   Hip Abduction: 5/5   Quadriceps:  5/5   Hamstrin/5   Left Lower Extremity   Hip Abduction: 5/5   Quadriceps:  5/5   Hamstrin/5     Vascular Exam       Edema  Right Lower Leg: absent  Left Lower Leg: absent    EXAMINATION:  XR KNEE COMP 4 OR MORE VIEWS RIGHT     CLINICAL HISTORY:  Pain, unspecified     TECHNIQUE:  AP, oblique, and lateral views of the right knee.     COMPARISON:  Right knee radiographs 2022.     FINDINGS:  No acute fracture or malalignment is identified of the right knee. There is no suprapatellar joint effusion of the right knee. The joint spaces of the right knee are maintained. There is no aggressive-appearing bone lesion or abnormal periosteal reaction. No soft tissue gas or calcification. Bone mineralization is maintained.     Impression:     No acute osseous abnormality of the right knee.        Electronically signed by: Ja Lucio  Date:                                            2022  Time:                        Xray of right knee from  with my interpretation as follows: No acute fracture, dislocation or misalignment. Slight narrowing of the medial compartment with subchondral sclerosis.       Assessment:       Encounter Diagnoses   Name  Primary?    Class 1 obesity with body mass index (BMI) of 33.0 to 33.9 in adult, unspecified obesity type, unspecified whether serious comorbidity present     Tear of medial meniscus of right knee, current, unspecified tear type, initial encounter Yes    Iliotibial band syndrome of right side           Plan:       Dx: Right Knee medial meniscus tear following acute twisting injury approximately 6 weeks ago. Xray at time of her eval in the ED showed no acute findings. Tolerated therapy well and her ITB syndrome has now resolved since last seen. . Acute in moderate exacerbation. Discussed with patient diagnosis and treatment recommendations. Handout given.   Imaging: prior radiological studies independently reviewed; discussed with patient; agree with radiologist interpretation.   Treatment Plan: Will treat with Physical therapy for possible knee meniscus injury without effusion for 3 months. Consider advanced imaging if persists or symptoms worsen. May also consider aspiration/injection pending.  Weight Management: is paramount. Recommend at least 10% total body weight loss.   Procedure: Discussed CSI/VSI as treatment options; will consider CSI/VSI in the future if conservative treatments do not improve symptoms.   Activity: Activity as tolerated; HEP to include aerobic conditioning and strength training with non-painful activity. ROM/STG exercises. Proper footware; assistive devises to avoid limping.   Therapy: Physical Therapy.   Medication: First line treatment with daily Acetaminophen 1000mg TID; medication precautions given .   RTC: 4 months                      Patient questionnaires may have been collected.

## 2022-07-25 ENCOUNTER — OFFICE VISIT (OUTPATIENT)
Dept: FAMILY MEDICINE | Facility: CLINIC | Age: 37
End: 2022-07-25
Payer: MEDICAID

## 2022-07-25 VITALS
TEMPERATURE: 98 F | RESPIRATION RATE: 20 BRPM | BODY MASS INDEX: 34.74 KG/M2 | DIASTOLIC BLOOD PRESSURE: 73 MMHG | HEART RATE: 72 BPM | SYSTOLIC BLOOD PRESSURE: 107 MMHG | OXYGEN SATURATION: 98 % | WEIGHT: 203.5 LBS | HEIGHT: 64 IN

## 2022-07-25 DIAGNOSIS — T14.8XXA BRUISING: Primary | ICD-10-CM

## 2022-07-25 PROBLEM — E89.0 POSTOPERATIVE HYPOTHYROIDISM: Status: ACTIVE | Noted: 2022-07-25

## 2022-07-25 PROBLEM — E78.5 HYPERLIPIDEMIA: Status: ACTIVE | Noted: 2022-07-25

## 2022-07-25 PROBLEM — C73 PAPILLARY CARCINOMA OF THYROID: Status: ACTIVE | Noted: 2022-07-25

## 2022-07-25 PROBLEM — E55.9 VITAMIN D DEFICIENCY: Status: ACTIVE | Noted: 2022-07-25

## 2022-07-25 PROBLEM — Z98.890 HISTORY OF THYROIDECTOMY: Status: ACTIVE | Noted: 2022-07-25

## 2022-07-25 PROBLEM — E89.0 HISTORY OF THYROIDECTOMY: Status: ACTIVE | Noted: 2022-07-25

## 2022-07-25 PROBLEM — J45.20 MILD INTERMITTENT ASTHMA: Status: ACTIVE | Noted: 2022-07-25

## 2022-07-25 PROBLEM — K76.0 STEATOSIS OF LIVER: Status: ACTIVE | Noted: 2022-07-25

## 2022-07-25 PROBLEM — E11.9 TYPE 2 DIABETES MELLITUS WITHOUT COMPLICATION: Status: ACTIVE | Noted: 2022-07-25

## 2022-07-25 PROBLEM — Z90.89 HISTORY OF THYROIDECTOMY: Status: ACTIVE | Noted: 2022-07-25

## 2022-07-25 LAB
ALBUMIN SERPL-MCNC: 4.1 GM/DL (ref 3.5–5)
ALBUMIN/GLOB SERPL: 1.1 RATIO (ref 1.1–2)
ALP SERPL-CCNC: 69 UNIT/L (ref 40–150)
ALT SERPL-CCNC: 30 UNIT/L (ref 0–55)
AST SERPL-CCNC: 18 UNIT/L (ref 5–34)
BASOPHILS # BLD AUTO: 0.06 X10(3)/MCL (ref 0–0.2)
BASOPHILS NFR BLD AUTO: 0.6 %
BILIRUBIN DIRECT+TOT PNL SERPL-MCNC: 0.4 MG/DL
BUN SERPL-MCNC: 13.9 MG/DL (ref 7–18.7)
CALCIUM SERPL-MCNC: 9.4 MG/DL (ref 8.4–10.2)
CHLORIDE SERPL-SCNC: 102 MMOL/L (ref 98–107)
CO2 SERPL-SCNC: 28 MMOL/L (ref 22–29)
CREAT SERPL-MCNC: 0.87 MG/DL (ref 0.55–1.02)
EOSINOPHIL # BLD AUTO: 0.32 X10(3)/MCL (ref 0–0.9)
EOSINOPHIL NFR BLD AUTO: 3 %
ERYTHROCYTE [DISTWIDTH] IN BLOOD BY AUTOMATED COUNT: 12.7 % (ref 11.5–17)
GLOBULIN SER-MCNC: 3.9 GM/DL (ref 2.4–3.5)
GLUCOSE SERPL-MCNC: 146 MG/DL (ref 74–100)
HCT VFR BLD AUTO: 42.5 % (ref 37–47)
HGB BLD-MCNC: 14.2 GM/DL (ref 12–16)
IMM GRANULOCYTES # BLD AUTO: 0.07 X10(3)/MCL (ref 0–0.04)
IMM GRANULOCYTES NFR BLD AUTO: 0.6 %
LYMPHOCYTES # BLD AUTO: 2.34 X10(3)/MCL (ref 0.6–4.6)
LYMPHOCYTES NFR BLD AUTO: 21.6 %
MCH RBC QN AUTO: 28.9 PG (ref 27–31)
MCHC RBC AUTO-ENTMCNC: 33.4 MG/DL (ref 33–36)
MCV RBC AUTO: 86.4 FL (ref 80–94)
MONOCYTES # BLD AUTO: 0.47 X10(3)/MCL (ref 0.1–1.3)
MONOCYTES NFR BLD AUTO: 4.3 %
NEUTROPHILS # BLD AUTO: 7.6 X10(3)/MCL (ref 2.1–9.2)
NEUTROPHILS NFR BLD AUTO: 69.9 %
NRBC BLD AUTO-RTO: 0 %
PLATELET # BLD AUTO: 175 X10(3)/MCL (ref 130–400)
PMV BLD AUTO: 11.5 FL (ref 7.4–10.4)
POTASSIUM SERPL-SCNC: 4.7 MMOL/L (ref 3.5–5.1)
PROT SERPL-MCNC: 8 GM/DL (ref 6.4–8.3)
RBC # BLD AUTO: 4.92 X10(6)/MCL (ref 4.2–5.4)
SODIUM SERPL-SCNC: 137 MMOL/L (ref 136–145)
T4 FREE SERPL-MCNC: 0.88 NG/DL (ref 0.7–1.48)
TSH SERPL-ACNC: 14.59 UIU/ML (ref 0.35–4.94)
WBC # SPEC AUTO: 10.8 X10(3)/MCL (ref 4.5–11.5)

## 2022-07-25 PROCEDURE — 3008F BODY MASS INDEX DOCD: CPT | Mod: CPTII,,, | Performed by: NURSE PRACTITIONER

## 2022-07-25 PROCEDURE — 85025 COMPLETE CBC W/AUTO DIFF WBC: CPT | Performed by: NURSE PRACTITIONER

## 2022-07-25 PROCEDURE — 84439 ASSAY OF FREE THYROXINE: CPT | Performed by: NURSE PRACTITIONER

## 2022-07-25 PROCEDURE — 99214 OFFICE O/P EST MOD 30 MIN: CPT | Mod: PBBFAC,PN | Performed by: NURSE PRACTITIONER

## 2022-07-25 PROCEDURE — 3074F SYST BP LT 130 MM HG: CPT | Mod: CPTII,,, | Performed by: NURSE PRACTITIONER

## 2022-07-25 PROCEDURE — 84443 ASSAY THYROID STIM HORMONE: CPT | Performed by: NURSE PRACTITIONER

## 2022-07-25 PROCEDURE — 1159F MED LIST DOCD IN RCRD: CPT | Mod: CPTII,,, | Performed by: NURSE PRACTITIONER

## 2022-07-25 PROCEDURE — 1160F PR REVIEW ALL MEDS BY PRESCRIBER/CLIN PHARMACIST DOCUMENTED: ICD-10-PCS | Mod: CPTII,,, | Performed by: NURSE PRACTITIONER

## 2022-07-25 PROCEDURE — 85610 PROTHROMBIN TIME: CPT | Performed by: NURSE PRACTITIONER

## 2022-07-25 PROCEDURE — 80053 COMPREHEN METABOLIC PANEL: CPT | Performed by: NURSE PRACTITIONER

## 2022-07-25 PROCEDURE — 3074F PR MOST RECENT SYSTOLIC BLOOD PRESSURE < 130 MM HG: ICD-10-PCS | Mod: CPTII,,, | Performed by: NURSE PRACTITIONER

## 2022-07-25 PROCEDURE — 99213 PR OFFICE/OUTPT VISIT, EST, LEVL III, 20-29 MIN: ICD-10-PCS | Mod: S$PBB,,, | Performed by: NURSE PRACTITIONER

## 2022-07-25 PROCEDURE — 3078F DIAST BP <80 MM HG: CPT | Mod: CPTII,,, | Performed by: NURSE PRACTITIONER

## 2022-07-25 PROCEDURE — 36415 COLL VENOUS BLD VENIPUNCTURE: CPT | Performed by: NURSE PRACTITIONER

## 2022-07-25 PROCEDURE — 99213 OFFICE O/P EST LOW 20 MIN: CPT | Mod: S$PBB,,, | Performed by: NURSE PRACTITIONER

## 2022-07-25 PROCEDURE — 1160F RVW MEDS BY RX/DR IN RCRD: CPT | Mod: CPTII,,, | Performed by: NURSE PRACTITIONER

## 2022-07-25 PROCEDURE — 3008F PR BODY MASS INDEX (BMI) DOCUMENTED: ICD-10-PCS | Mod: CPTII,,, | Performed by: NURSE PRACTITIONER

## 2022-07-25 PROCEDURE — 3078F PR MOST RECENT DIASTOLIC BLOOD PRESSURE < 80 MM HG: ICD-10-PCS | Mod: CPTII,,, | Performed by: NURSE PRACTITIONER

## 2022-07-25 PROCEDURE — 1159F PR MEDICATION LIST DOCUMENTED IN MEDICAL RECORD: ICD-10-PCS | Mod: CPTII,,, | Performed by: NURSE PRACTITIONER

## 2022-07-25 NOTE — PROGRESS NOTES
Patient Name: Kinza Ford   : 1985  MRN: [id number]     SUBJECTIVE:  Kinza Ford is a 36 y.o. female here for Follow-up (Unusual bruising )     22:  FU. States is not healing.  Is bruising easily.  PT told her to FU with her dr. States had weird episode of losing 18 pounds in 2 weeks, was super stressed at the time but gained it all back.  Has had some swollen lymph nodes in armpit and groin but they went away.  Here for labs    22: FU  HLD: last FLP in july was WNL  DM: last hgbA1c 6.4 in November. Diet is better, not straying. Fasting are 170 in AM. During day it runs 110-120. Exercise is only thing that impacts fasting and has been unable to do that due to recent thyroid surgery.  -Microalbumin .1  -Foot exam   Mild intermittent asthma: no issues with asthma in a long time. Does have allergies  Menorrhagia: resolved. hysterectomy performed last year  Takes Spirinolactone for hair loss and it is helping, prescribed by Dr. Klein  Anxiety/Depression: Wellbutrin and buspirone not effective.  Event Name  Event Result  Date/Time    GAD7 Score 14 22 12:45:00   Initial Depression Screen Score 2 22 12:43:00   Detailed Depression Screen Score 8 22 12:43:00   Total Depression Screen Score 10 22 12:43:00     Right knee pain: 2 months hx pain reported. no injury. crossfit x 6 months in past. hx weight lifting. XR in [place] told negative. still has pain/stiffness mostly throughout day. nothing makes it better but movement.   Right ring finger trigger finger: x 1 year began after scraping paint. has had injected in past last February which helped but now having issues again. pain to base of finger.    10/19/21: Telemedicine FU 2 weeks.  This is a telehealth visit note. Patient was treated using telehealth, real time audio, video, or both according to [facility] protocols. Fely PAUL FNP-C, conducted the visit from location identified below. The patient  participated in the visit at a non-OU location selected by the patient (or patient's representative), identified below. I am licensed in the [facility]. The patient (or patient's representative) stated that they understood and accepted the privacy and security risks to their information at their location and has consented to telephone visit.  Patient was located at patient's home.  Fely PAUL FNP-C, was located at [facility] [address], [place], [place].  We increased Wellbutrin to 300mg last visit and ANN was 7, we inc Buspirone to 15mg po TID. Today ANN is 5. PHQ-9 is 0.  c/o yeast infection 2 days ago started, recent abx for UTI, recurrent yeast infections. after surgery her sugars have been higher than usual. HA tried Monistat OTC 2 rounds of 7 day tx. no relief.  Event Name  Event Result  Date/Time    GAD7 Score 5 10/19/21 13:00:00   Initial Depression Screen Score 0 10/19/21 13:00:00       10/5/2021: Telemedicine follow-up-3 months.  This is a telehealth visit note. Patient was treated using telehealth, real time audio, video, or both according to [facility] protocols. Fely PAUL FNP-C, conducted the visit from location identified below. The patient participated in the visit at a non-OU location selected by the patient (or patient's representative), identified below. I am licensed in the [facility]. The patient (or patient's representative) stated that they understood and accepted the privacy and security risks to their information at their location and has consented to telephone visit.  Patient was located at patient's home.  Fely PAUL FNP-C, was located at [address] [address], [place], [place].  ER visit 9/22/21-odynophagia s/p thyroidectomy. Is feeling tightness in throat makes her feel like she is choking  August 23, 21 she had a total thyroidectomy for papillary thyroid cancer.  HLD: last FLP in july was WNL  DM: last hgbA1c 6.3 in July. Diet is  better, not straying. Fasting are 170 in AM. During day it runs 110-120. Exercise is only thing that impacts fasting and has been unable to do that due to recent thyroid surgery.  microalbumin july 5.1  foot exam 7/21  Mild intermittent asthma: no issues with asthma in a long time. Does have allergies  Menorrhagia: states her periods are really heavy. states puts tampon in and it falls out. This started after last child in 2018. cramping and huge clots reported. Upcoming appt with [facility] 10/25/21.  Takes Spirinolactone for hair loss and it is helping, prescribed by Dr. Klein  c/o joint pain to entire body, used to be just hands/wrists, now it is in shoulders. Sister has lupus. She has never been worked up for it.    7/6/21: Complaints today: Here to establish care.  Thyroid nodule: no surveillance since 2018; right lobe nodule previously biopsied.  HLD: FLP 2019; Trig 202  DM: last hgbA1c 6.9 in 2019; no labs since that time; became diabetic when pregnant and has been on medication since 2018 for it.  Mild intermittent asthma: no issues with asthma in a long time. Does have allergies  menorrhagia: states her periods are really heavy, June 12th last one. states puts tampon in and it falls out. This started after last child in 2018. cramping and huge clots reported.  Recent UTI and prescribed Macrobid. Using OTC Monistat without relief.  Takes Spirinolactone for hair loss and it is helping, prescribed by Dr. Klein  Cancer Screening:  Colonoscopy: 2018  Lung CT screening: not indicated  PAP: due-2018  MMG: not due  Vaccines:  Tetanus/Tdap: due  Flu: not indicated  Pneumonia: never had    The patient's allergies, medications, history, and problem list were updated as appropriate.    REVIEW OF SYSTEMS:  A comprehensive review of symptoms was completed and negative except as noted in the HPI.    OBJECTIVE:  Vital signs  Vitals:    07/25/22 1223   BP: 107/73   BP Location: Left arm   Patient Position: Sitting   BP  "Method: Medium (Automatic)   Pulse: 72   Resp: 20   Temp: 98.2 °F (36.8 °C)   TempSrc: Oral   SpO2: 98%   Weight: 92.3 kg (203 lb 8 oz)   Height: 5' 4" (1.626 m)        Physical Exam  Vitals and nursing note reviewed.   HENT:      Head: Normocephalic and atraumatic.   Cardiovascular:      Rate and Rhythm: Normal rate and regular rhythm.      Pulses: Normal pulses.      Heart sounds: Normal heart sounds.   Pulmonary:      Breath sounds: Normal breath sounds.   Musculoskeletal:         General: Normal range of motion.      Cervical back: Normal range of motion.   Skin:     General: Skin is warm and dry.   Neurological:      General: No focal deficit present.      Mental Status: She is alert and oriented to person, place, and time.   Psychiatric:         Mood and Affect: Mood normal.          ASSESSMENT/PLAN:  1. Bruising  Assessment & Plan:  CBC, CMP, TSH, FREE T4, PT/INR    Orders:  -     CBC Auto Differential  -     Comprehensive Metabolic Panel  -     T4, Free  -     TSH  -     Protime-INR     No results found for this or any previous visit (from the past 24 hour(s)).      Follow Up:  Follow up in about 1 week (around 8/1/2022) for Review of labs.     This note was created with the assistance of a voice recognition software or phone dictation. There may be transcription errors as a result of using this technology however minimal. Effort has been made to assure accuracy of transcription but any obvious errors or omissions should be clarified with the author of the document      "

## 2022-07-28 DIAGNOSIS — E89.0 POSTOPERATIVE HYPOTHYROIDISM: Primary | ICD-10-CM

## 2022-07-28 RX ORDER — LEVOTHYROXINE SODIUM 150 UG/1
150 TABLET ORAL
Qty: 30 TABLET | Refills: 3 | Status: SHIPPED | OUTPATIENT
Start: 2022-07-28 | End: 2022-11-29

## 2022-07-28 RX ORDER — BUPROPION HYDROCHLORIDE 300 MG/1
TABLET ORAL
Qty: 30 TABLET | Refills: 3 | Status: SHIPPED | OUTPATIENT
Start: 2022-07-28 | End: 2022-09-20 | Stop reason: SDUPTHER

## 2022-07-28 NOTE — TELEPHONE ENCOUNTER
Thyroid level is high at 14.  Please ensure that she is taking her thyroid medication every morning first thing when she wakes before eating or drinking anything and that she is not taking with any other medications.      If she is taking as directed, we need to increase her dose and repeat her thyroid level in 6 weeks.      Please let me know and I will order the new medication at a higher dose and then will put order in for repeat labs in 6 weeks which can be drawn at this clinic.      Fely

## 2022-07-28 NOTE — TELEPHONE ENCOUNTER
I have sent medications and/or lab orders in for this patient.  Please notify the patient. TSH/Free T4 in 6 weeks can come to clinic 9/8/22 through 9/22/22 to have done.      Orders Placed This Encounter   Procedures    TSH           Standing Status:   Future     Standing Expiration Date:   9/22/2022    T4, Free           Standing Status:   Future     Standing Expiration Date:   9/22/2022       Medications Ordered This Encounter   Medications    buPROPion (WELLBUTRIN XL) 300 MG 24 hr tablet     Sig: TAKE 1 TABLET BY MOUTH DAILY     Dispense:  30 tablet     Refill:  3    levothyroxine (SYNTHROID) 150 MCG tablet     Sig: Take 1 tablet (150 mcg total) by mouth before breakfast.     Dispense:  30 tablet     Refill:  3

## 2022-08-02 ENCOUNTER — OFFICE VISIT (OUTPATIENT)
Dept: FAMILY MEDICINE | Facility: CLINIC | Age: 37
End: 2022-08-02
Payer: MEDICAID

## 2022-08-02 VITALS
RESPIRATION RATE: 20 BRPM | HEIGHT: 64 IN | SYSTOLIC BLOOD PRESSURE: 115 MMHG | BODY MASS INDEX: 33.97 KG/M2 | DIASTOLIC BLOOD PRESSURE: 82 MMHG | TEMPERATURE: 98 F | OXYGEN SATURATION: 100 % | HEART RATE: 62 BPM | WEIGHT: 199 LBS

## 2022-08-02 DIAGNOSIS — E89.0 POSTOPERATIVE HYPOTHYROIDISM: Primary | ICD-10-CM

## 2022-08-02 PROCEDURE — 3008F PR BODY MASS INDEX (BMI) DOCUMENTED: ICD-10-PCS | Mod: CPTII,,, | Performed by: NURSE PRACTITIONER

## 2022-08-02 PROCEDURE — 3079F DIAST BP 80-89 MM HG: CPT | Mod: CPTII,,, | Performed by: NURSE PRACTITIONER

## 2022-08-02 PROCEDURE — 1159F MED LIST DOCD IN RCRD: CPT | Mod: CPTII,,, | Performed by: NURSE PRACTITIONER

## 2022-08-02 PROCEDURE — 1160F RVW MEDS BY RX/DR IN RCRD: CPT | Mod: CPTII,,, | Performed by: NURSE PRACTITIONER

## 2022-08-02 PROCEDURE — 3074F SYST BP LT 130 MM HG: CPT | Mod: CPTII,,, | Performed by: NURSE PRACTITIONER

## 2022-08-02 PROCEDURE — 1159F PR MEDICATION LIST DOCUMENTED IN MEDICAL RECORD: ICD-10-PCS | Mod: CPTII,,, | Performed by: NURSE PRACTITIONER

## 2022-08-02 PROCEDURE — 3074F PR MOST RECENT SYSTOLIC BLOOD PRESSURE < 130 MM HG: ICD-10-PCS | Mod: CPTII,,, | Performed by: NURSE PRACTITIONER

## 2022-08-02 PROCEDURE — 3079F PR MOST RECENT DIASTOLIC BLOOD PRESSURE 80-89 MM HG: ICD-10-PCS | Mod: CPTII,,, | Performed by: NURSE PRACTITIONER

## 2022-08-02 PROCEDURE — 1160F PR REVIEW ALL MEDS BY PRESCRIBER/CLIN PHARMACIST DOCUMENTED: ICD-10-PCS | Mod: CPTII,,, | Performed by: NURSE PRACTITIONER

## 2022-08-02 PROCEDURE — 99213 OFFICE O/P EST LOW 20 MIN: CPT | Mod: S$PBB,,, | Performed by: NURSE PRACTITIONER

## 2022-08-02 PROCEDURE — 99213 PR OFFICE/OUTPT VISIT, EST, LEVL III, 20-29 MIN: ICD-10-PCS | Mod: S$PBB,,, | Performed by: NURSE PRACTITIONER

## 2022-08-02 PROCEDURE — 3008F BODY MASS INDEX DOCD: CPT | Mod: CPTII,,, | Performed by: NURSE PRACTITIONER

## 2022-08-02 PROCEDURE — 99214 OFFICE O/P EST MOD 30 MIN: CPT | Mod: PBBFAC,PN | Performed by: NURSE PRACTITIONER

## 2022-08-02 NOTE — PROGRESS NOTES
Patient Name: Kinza Ford   : 1985  MRN: [id number]     SUBJECTIVE:  Kinza Ford is a 36 y.o. female here for Follow-up (1 wk f/u up for lab review)      22:  FU from recent labs.  TSH was up to 14.  She tells me she was taking 150mcg and they decreased to 125mcg because she started having hair loss.  This was a few months ago.  Then she started having issues with the 125mcg dose.  I increased her medication to 150 and she has been taking since we last spoke on the phone.  Repeat TSH due in September. Will reach out to Endocrine at that time if still not Euthyroid.  She is not due to see Endo for months.  I did recommend FU with ENT since she had her thyroid removed.       22:  FU. States is not healing.  Is bruising easily.  PT told her to FU with her dr. States had weird episode of losing 18 pounds in 2 weeks, was super stressed at the time but gained it all back.  Has had some swollen lymph nodes in armpit and groin but they went away.  Here for labs    22: FU  HLD: last FLP in july was WNL  DM: last hgbA1c 6.4 in November. Diet is better, not straying. Fasting are 170 in AM. During day it runs 110-120. Exercise is only thing that impacts fasting and has been unable to do that due to recent thyroid surgery.  -Microalbumin .1  -Foot exam   Mild intermittent asthma: no issues with asthma in a long time. Does have allergies  Menorrhagia: resolved. hysterectomy performed last year  Takes Spirinolactone for hair loss and it is helping, prescribed by Dr. Klein  Anxiety/Depression: Wellbutrin and buspirone not effective.  Event Name  Event Result  Date/Time   GAD7 Score 14 22 12:45:00  Initial Depression Screen Score 2 22 12:43:00  Detailed Depression Screen Score 8 22 12:43:00  Total Depression Screen Score 10 22 12:43:00    Right knee pain: 2 months hx pain reported. no injury. crossfit x 6 months in past. hx weight lifting. [place] in [place]  told negative. still has pain/stiffness mostly throughout day. nothing makes it better but movement.   Right ring finger trigger finger: x 1 year began after scraping paint. has had injected in past last February which helped but now having issues again. pain to base of finger.    10/19/21: Telemedicine FU 2 weeks.  This is a telehealth visit note. Patient was treated using telehealth, real time audio, video, or both according to [facility] protocols. Fely PAUL FNP-C, conducted the visit from location identified below. The patient participated in the visit at a non-OU location selected by the patient (or patient's representative), identified below. I am licensed in the [facility]. The patient (or patient's representative) stated that they understood and accepted the privacy and security risks to their information at their location and has consented to telephone visit.  Patient was located at patient's home.  Fely PAUL FNP-C, was located at [facility] [address].  We increased Wellbutrin to 300mg last visit and ANN was 7, we inc Buspirone to 15mg po TID. Today ANN is 5. PHQ-9 is 0.  c/o yeast infection 2 days ago started, recent abx for UTI, recurrent yeast infections. after surgery her sugars have been higher than usual. HA tried Monistat OTC 2 rounds of 7 day tx. no relief.  Event Name  Event Result  Date/Time   GAD7 Score 5 10/19/21 13:00:00  Initial Depression Screen Score 0 10/19/21 13:00:00      10/5/2021: Telemedicine follow-up-3 months.  This is a telehealth visit note. Patient was treated using telehealth, real time audio, video, or both according to [facility] protocols. Fely PAUL FNP-C, conducted the visit from location identified below. The patient participated in the visit at a non-OU location selected by the patient (or patient's representative), identified below. I am licensed in the [facility]. The patient (or patient's representative) stated  that they understood and accepted the privacy and security risks to their information at their location and has consented to telephone visit.  Patient was located at patient's home.  I, WARREN Ramsay, was located at [facility] , [place], [place].  ER visit 9/22/21-odynophagia s/p thyroidectomy. Is feeling tightness in throat makes her feel like she is choking  August 23, 21 she had a total thyroidectomy for papillary thyroid cancer.  HLD: last FLP in july was WNL  DM: last hgbA1c 6.3 in July. Diet is better, not straying. Fasting are 170 in AM. During day it runs 110-120. Exercise is only thing that impacts fasting and has been unable to do that due to recent thyroid surgery.  microalbumin july 5.1  foot exam 7/21  Mild intermittent asthma: no issues with asthma in a long time. Does have allergies  Menorrhagia: states her periods are really heavy. states puts tampon in and it falls out. This started after last child in 2018. cramping and huge clots reported. Upcoming appt with [facility] 10/25/21.  Takes Spirinolactone for hair loss and it is helping, prescribed by Dr. Klein  c/o joint pain to entire body, used to be just hands/wrists, now it is in shoulders. Sister has lupus. She has never been worked up for it.    7/6/21: Complaints today: Here to establish care.  Thyroid nodule: no surveillance since 2018; right lobe nodule previously biopsied.  HLD: FLP 2019; Trig 202  DM: last hgbA1c 6.9 in 2019; no labs since that time; became diabetic when pregnant and has been on medication since 2018 for it.  Mild intermittent asthma: no issues with asthma in a long time. Does have allergies  menorrhagia: states her periods are really heavy, June 12th last one. states puts tampon in and it falls out. This started after last child in 2018. cramping and huge clots reported.  Recent UTI and prescribed Macrobid. Using OTC Monistat without relief.  Takes Spirinolactone for hair loss and it is  "helping, prescribed by Dr. Klein  Cancer Screening:  Colonoscopy: 2018  Lung CT screening: not indicated  PAP: due-2018  MMG: not due  Vaccines:  Tetanus/Tdap: due  Flu: not indicated  Pneumonia: never had        ALLERGIES:   Review of patient's allergies indicates:   Allergen Reactions    Adhesive      Other reaction(s): Burn    Sulfa (sulfonamide antibiotics)      Other reaction(s): Unknown    Sulfamethoxazole      Other reaction(s): Rash         ROS:  ROS      OBJECTIVE:  Vital signs  Vitals:    08/02/22 0959   BP: 115/82   BP Location: Right arm   Patient Position: Sitting   BP Method: Medium (Automatic)   Pulse: 62   Resp: 20   Temp: 97.9 °F (36.6 °C)   TempSrc: Oral   SpO2: 100%   Weight: 90.3 kg (199 lb)   Height: 5' 4" (1.626 m)      Body mass index is 34.16 kg/m².    PHYSICAL EXAM:   Physical Exam  Vitals and nursing note reviewed.   HENT:      Head: Normocephalic and atraumatic.   Cardiovascular:      Rate and Rhythm: Normal rate and regular rhythm.      Pulses: Normal pulses.      Heart sounds: Normal heart sounds.   Pulmonary:      Breath sounds: Normal breath sounds.   Musculoskeletal:         General: Normal range of motion.      Cervical back: Normal range of motion.   Skin:     General: Skin is warm and dry.   Neurological:      General: No focal deficit present.      Mental Status: She is alert and oriented to person, place, and time.   Psychiatric:         Mood and Affect: Mood normal.          ASSESSMENT/PLAN:  1. Postoperative hypothyroidism  Overview:  Lab Results   Component Value Date    TSH 14.5949 (H) 07/25/2022         Assessment & Plan:  Repeat TSH 9/8.  RTC after.    Pending Endo appt.    Discussed we may need to adjust medication to get between 125mcg and 150mcg to maintain Euthyroid state. Will consider 137mcg once TSH results.           RESULTS:  Recent Results (from the past 1008 hour(s))   Comprehensive Metabolic Panel    Collection Time: 07/25/22  1:07 PM   Result Value Ref Range "    Sodium Level 137 136 - 145 mmol/L    Potassium Level 4.7 3.5 - 5.1 mmol/L    Chloride 102 98 - 107 mmol/L    Carbon Dioxide 28 22 - 29 mmol/L    Glucose Level 146 (H) 74 - 100 mg/dL    Blood Urea Nitrogen 13.9 7.0 - 18.7 mg/dL    Creatinine 0.87 0.55 - 1.02 mg/dL    Calcium Level Total 9.4 8.4 - 10.2 mg/dL    Protein Total 8.0 6.4 - 8.3 gm/dL    Albumin Level 4.1 3.5 - 5.0 gm/dL    Globulin 3.9 (H) 2.4 - 3.5 gm/dL    Albumin/Globulin Ratio 1.1 1.1 - 2.0 ratio    Bilirubin Total 0.4 <=1.5 mg/dL    Alkaline Phosphatase 69 40 - 150 unit/L    Alanine Aminotransferase 30 0 - 55 unit/L    Aspartate Aminotransferase 18 5 - 34 unit/L    Estimated GFR-Non  >60 mls/min/1.73/m2   T4, Free    Collection Time: 07/25/22  1:07 PM   Result Value Ref Range    Thyroxine Free 0.88 0.70 - 1.48 ng/dL   TSH    Collection Time: 07/25/22  1:07 PM   Result Value Ref Range    Thyroid Stimulating Hormone 14.5949 (H) 0.3500 - 4.9400 uIU/mL   CBC with Differential    Collection Time: 07/25/22  1:07 PM   Result Value Ref Range    WBC 10.8 4.5 - 11.5 x10(3)/mcL    RBC 4.92 4.20 - 5.40 x10(6)/mcL    Hgb 14.2 12.0 - 16.0 gm/dL    Hct 42.5 37.0 - 47.0 %    MCV 86.4 80.0 - 94.0 fL    MCH 28.9 27.0 - 31.0 pg    MCHC 33.4 33.0 - 36.0 mg/dL    RDW 12.7 11.5 - 17.0 %    Platelet 175 130 - 400 x10(3)/mcL    MPV 11.5 (H) 7.4 - 10.4 fL    Neut % 69.9 %    Lymph % 21.6 %    Mono % 4.3 %    Eos % 3.0 %    Basophil % 0.6 %    Lymph # 2.34 0.6 - 4.6 x10(3)/mcL    Neut # 7.6 2.1 - 9.2 x10(3)/mcL    Mono # 0.47 0.1 - 1.3 x10(3)/mcL    Eos # 0.32 0 - 0.9 x10(3)/mcL    Baso # 0.06 0 - 0.2 x10(3)/mcL    IG# 0.07 (H) 0 - 0.04 x10(3)/mcL    IG% 0.6 %    NRBC% 0.0 %   Protime-INR    Collection Time: 07/25/22  1:07 PM   Result Value Ref Range    PT 12.6 seconds    INR 0.96 0.00 - 1.30         Follow Up:  Follow up if symptoms worsen or fail to improve; next appointment is 8/24, for Virtual Visit 8/24 appointment already scheduled .     This note was  created with the assistance of a voice recognition software or phone dictation. There may be transcription errors as a result of using this technology however minimal. Effort has been made to assure accuracy of transcription but any obvious errors or omissions should be clarified with the author of the document

## 2022-08-02 NOTE — ASSESSMENT & PLAN NOTE
Repeat TSH 9/8.  RTC after.    Pending Endo appt.    Discussed we may need to adjust medication to get between 125mcg and 150mcg to maintain Euthyroid state. Will consider 137mcg once TSH results.

## 2022-08-25 ENCOUNTER — OFFICE VISIT (OUTPATIENT)
Dept: FAMILY MEDICINE | Facility: CLINIC | Age: 37
End: 2022-08-25
Payer: MEDICAID

## 2022-08-25 DIAGNOSIS — E11.9 TYPE 2 DIABETES MELLITUS WITHOUT COMPLICATION, WITHOUT LONG-TERM CURRENT USE OF INSULIN: ICD-10-CM

## 2022-08-25 DIAGNOSIS — E78.5 HYPERLIPIDEMIA, UNSPECIFIED HYPERLIPIDEMIA TYPE: ICD-10-CM

## 2022-08-25 DIAGNOSIS — E89.0 POSTOPERATIVE HYPOTHYROIDISM: Primary | ICD-10-CM

## 2022-08-25 PROCEDURE — 99213 OFFICE O/P EST LOW 20 MIN: CPT | Mod: 95,,, | Performed by: NURSE PRACTITIONER

## 2022-08-25 PROCEDURE — 1159F PR MEDICATION LIST DOCUMENTED IN MEDICAL RECORD: ICD-10-PCS | Mod: CPTII,95,, | Performed by: NURSE PRACTITIONER

## 2022-08-25 PROCEDURE — 1160F RVW MEDS BY RX/DR IN RCRD: CPT | Mod: CPTII,95,, | Performed by: NURSE PRACTITIONER

## 2022-08-25 PROCEDURE — 1159F MED LIST DOCD IN RCRD: CPT | Mod: CPTII,95,, | Performed by: NURSE PRACTITIONER

## 2022-08-25 PROCEDURE — 1160F PR REVIEW ALL MEDS BY PRESCRIBER/CLIN PHARMACIST DOCUMENTED: ICD-10-PCS | Mod: CPTII,95,, | Performed by: NURSE PRACTITIONER

## 2022-08-25 PROCEDURE — 99213 PR OFFICE/OUTPT VISIT, EST, LEVL III, 20-29 MIN: ICD-10-PCS | Mod: 95,,, | Performed by: NURSE PRACTITIONER

## 2022-08-25 NOTE — PROGRESS NOTES
Audio Only Telehealth Visit     The patient location is: [place]  The chief complaint leading to consultation is: FU  Visit type: Virtual visit with audio only (telephone)  Total time spent with patient: 15 min     The reason for the audio only service rather than synchronous audio and video virtual visit was related to technical difficulties or patient preference/necessity.     Each patient to whom I provide medical services by telemedicine is:  (1) informed of the relationship between the physician and patient and the respective role of any other health care provider with respect to management of the patient; and (2) notified that they may decline to receive medical services by telemedicine and may withdraw from such care at any time. Patient verbally consented to receive this service via voice-only telephone call.    This service was not originating from a related E/M service provided within the previous 7 days nor will  to an E/M service or procedure within the next 24 hours or my soonest available appointment.  Prevailing standard of care was able to be met in this audio-only visit.        Patient Name: Kinza Ford   : 1985  MRN: [id number]     SUBJECTIVE:  Kinza Ford is a 36 y.o. female here for No chief complaint on file.    22:  FU, feeling better. Not as tired and not bruising as easily as was.  Does not have any cold sores.  Once she got a couple of weeks into higher dose of synthroid blood sugars started decreasing.  Appt with ENT 4/5 months.      22:  FU from recent labs.  TSH was up to 14.  She tells me she was taking 150mcg and they decreased to 125mcg because she started having hair loss.  This was a few months ago.  Then she started having issues with the 125mcg dose.  I increased her medication to 150 and she has been taking since we last spoke on the phone.  Repeat TSH due in September. Will reach out to Endocrine at that time if still not Euthyroid.  She  is not due to see Endo for months.  I did recommend FU with ENT since she had her thyroid removed.       7/25/22:  FU. States is not healing.  Is bruising easily.  PT told her to FU with her dr. States had weird episode of losing 18 pounds in 2 weeks, was super stressed at the time but gained it all back.  Has had some swollen lymph nodes in armpit and groin but they went away.  Here for labs    2/24/22: FU  HLD: last FLP in july was WNL  DM: last hgbA1c 6.4 in November. Diet is better, not straying. Fasting are 170 in AM. During day it runs 110-120. Exercise is only thing that impacts fasting and has been unable to do that due to recent thyroid surgery.  -Microalbumin july 5.1  -Foot exam 7/21  Mild intermittent asthma: no issues with asthma in a long time. Does have allergies  Menorrhagia: resolved. hysterectomy performed last year  Takes Spirinolactone for hair loss and it is helping, prescribed by Dr. Klein  Anxiety/Depression: Wellbutrin and buspirone not effective.  Event Name  Event Result  Date/Time   GAD7 Score 14 02/24/22 12:45:00  Initial Depression Screen Score 2 02/24/22 12:43:00  Detailed Depression Screen Score 8 02/24/22 12:43:00  Total Depression Screen Score 10 02/24/22 12:43:00    Right knee pain: 2 months hx pain reported. no injury. crossfit x 6 months in past. hx weight lifting. XR in [place] told negative. still has pain/stiffness mostly throughout day. nothing makes it better but movement.   Right ring finger trigger finger: x 1 year began after scraping paint. has had injected in past last February which helped but now having issues again. pain to base of finger.    10/19/21: Telemedicine FU 2 weeks.  This is a telehealth visit note. Patient was treated using telehealth, real time audio, video, or both according to [facility] protocols. Fely PAUL FNP-C, conducted the visit from location identified below. The patient participated in the visit at a non-OU location selected by the patient  (or patient's representative), identified below. I am licensed in the [facility]. The patient (or patient's representative) stated that they understood and accepted the privacy and security risks to their information at their location and has consented to telephone visit.  Patient was located at patient's home.  Fely PAUL FNP-C, was located at [facility], [place], [place].  We increased Wellbutrin to 300mg last visit and ANN was 7, we inc Buspirone to 15mg po TID. Today ANN is 5. PHQ-9 is 0.  c/o yeast infection 2 days ago started, recent abx for UTI, recurrent yeast infections. after surgery her sugars have been higher than usual. HA tried Monistat OTC 2 rounds of 7 day tx. no relief.  Event Name  Event Result  Date/Time   GAD7 Score 5 10/19/21 13:00:00  Initial Depression Screen Score 0 10/19/21 13:00:00      10/5/2021: Telemedicine follow-up-3 months.  This is a telehealth visit note. Patient was treated using telehealth, real time audio, video, or both according to [facility] protocols. Fely PAUL FNP-C, conducted the visit from location identified below. The patient participated in the visit at a non-[facility] location selected by the patient (or patient's representative), identified below. I am licensed in the [facility]. The patient (or patient's representative) stated that they understood and accepted the privacy and security risks to their information at their location and has consented to telephone visit.  Patient was located at patient's home.  Fely PAUL FNP-C, was located at [facility], [place], [place].  ER visit 9/22/21-odynophagia s/p thyroidectomy. Is feeling tightness in throat makes her feel like she is choking  August 23, 21 she had a total thyroidectomy for papillary thyroid cancer.  HLD: last FLP in july was WNL  DM: last hgbA1c 6.3 in July. Diet is better, not straying. Fasting are 170 in AM. During day it runs 110-120.  Exercise is only thing that impacts fasting and has been unable to do that due to recent thyroid surgery.  microalbumin july 5.1  foot exam 7/21  Mild intermittent asthma: no issues with asthma in a long time. Does have allergies  Menorrhagia: states her periods are really heavy. states puts tampon in and it falls out. This started after last child in 2018. cramping and huge clots reported. Upcoming appt with [facility] 10/25/21.  Takes Spirinolactone for hair loss and it is helping, prescribed by Dr. Klein  c/o joint pain to entire body, used to be just hands/wrists, now it is in shoulders. Sister has lupus. She has never been worked up for it.    7/6/21: Complaints today: Here to establish care.  Thyroid nodule: no surveillance since 2018; right lobe nodule previously biopsied.  HLD: FLP 2019; Trig 202  DM: last hgbA1c 6.9 in 2019; no labs since that time; became diabetic when pregnant and has been on medication since 2018 for it.  Mild intermittent asthma: no issues with asthma in a long time. Does have allergies  menorrhagia: states her periods are really heavy, June 12th last one. states puts tampon in and it falls out. This started after last child in 2018. cramping and huge clots reported.  Recent UTI and prescribed Macrobid. Using OTC Monistat without relief.  Takes Spirinolactone for hair loss and it is helping, prescribed by Dr. Klein  Cancer Screening:  Colonoscopy: 2018  Lung CT screening: not indicated  PAP: due-2018  MMG: not due  Vaccines:  Tetanus/Tdap: due  Flu: not indicated  Pneumonia: never had          ALLERGIES:   Review of patient's allergies indicates:   Allergen Reactions    Adhesive      Other reaction(s): Burn    Sulfa (sulfonamide antibiotics)      Other reaction(s): Unknown    Sulfamethoxazole      Other reaction(s): Rash         ROS:  Review of Systems   Constitutional: Negative.    HENT: Negative.    Eyes: Negative.    Respiratory: Negative.    Cardiovascular: Negative.     Gastrointestinal: Negative.    Genitourinary: Negative.    Musculoskeletal: Negative.    Skin: Negative.    Neurological: Negative.    Endo/Heme/Allergies: Negative.    Psychiatric/Behavioral: Negative.    All other systems reviewed and are negative.        OBJECTIVE:  Vital signs  There were no vitals filed for this visit.   There is no height or weight on file to calculate BMI.    PHYSICAL EXAM:   Physical Exam  Vitals and nursing note reviewed.   Neurological:      Mental Status: She is alert.   Psychiatric:         Attention and Perception: Attention normal.         Mood and Affect: Mood normal.         Speech: Speech normal.         Behavior: Behavior normal. Behavior is cooperative.         Thought Content: Thought content normal.         Cognition and Memory: Cognition normal.         Judgment: Judgment normal.          ASSESSMENT/PLAN:  1. Postoperative hypothyroidism  Overview:  Lab Results   Component Value Date    TSH 14.5949 (H) 07/25/2022         Assessment & Plan:  Repeat TSH 9/8.  RTC after.    Pending Endo appt.    Discussed we may need to adjust medication to get between 125mcg and 150mcg to maintain Euthyroid state. Will consider 137mcg once TSH results.      2. Hyperlipidemia, unspecified hyperlipidemia type  -     Lipid Panel    3. Type 2 diabetes mellitus without complication, without long-term current use of insulin  Assessment & Plan:  Last hgbA1c 6.0 in February.   -Microalbumin July 2022- 5.1  -Foot exam 7/21, due next OV  Fundus 8 [facility] ago in [facility] when eye exam done    Orders:  -     Hemoglobin A1C         RESULTS:  Recent Results (from the past 1008 hour(s))   Comprehensive Metabolic Panel    Collection Time: 07/25/22  1:07 PM   Result Value Ref Range    Sodium Level 137 136 - 145 mmol/L    Potassium Level 4.7 3.5 - 5.1 mmol/L    Chloride 102 98 - 107 mmol/L    Carbon Dioxide 28 22 - 29 mmol/L    Glucose Level 146 (H) 74 - 100 mg/dL    Blood Urea Nitrogen 13.9 7.0 - 18.7 mg/dL     Creatinine 0.87 0.55 - 1.02 mg/dL    Calcium Level Total 9.4 8.4 - 10.2 mg/dL    Protein Total 8.0 6.4 - 8.3 gm/dL    Albumin Level 4.1 3.5 - 5.0 gm/dL    Globulin 3.9 (H) 2.4 - 3.5 gm/dL    Albumin/Globulin Ratio 1.1 1.1 - 2.0 ratio    Bilirubin Total 0.4 <=1.5 mg/dL    Alkaline Phosphatase 69 40 - 150 unit/L    Alanine Aminotransferase 30 0 - 55 unit/L    Aspartate Aminotransferase 18 5 - 34 unit/L    Estimated GFR-Non  >60 mls/min/1.73/m2   T4, Free    Collection Time: 07/25/22  1:07 PM   Result Value Ref Range    Thyroxine Free 0.88 0.70 - 1.48 ng/dL   TSH    Collection Time: 07/25/22  1:07 PM   Result Value Ref Range    Thyroid Stimulating Hormone 14.5949 (H) 0.3500 - 4.9400 uIU/mL   CBC with Differential    Collection Time: 07/25/22  1:07 PM   Result Value Ref Range    WBC 10.8 4.5 - 11.5 x10(3)/mcL    RBC 4.92 4.20 - 5.40 x10(6)/mcL    Hgb 14.2 12.0 - 16.0 gm/dL    Hct 42.5 37.0 - 47.0 %    MCV 86.4 80.0 - 94.0 fL    MCH 28.9 27.0 - 31.0 pg    MCHC 33.4 33.0 - 36.0 mg/dL    RDW 12.7 11.5 - 17.0 %    Platelet 175 130 - 400 x10(3)/mcL    MPV 11.5 (H) 7.4 - 10.4 fL    Neut % 69.9 %    Lymph % 21.6 %    Mono % 4.3 %    Eos % 3.0 %    Basophil % 0.6 %    Lymph # 2.34 0.6 - 4.6 x10(3)/mcL    Neut # 7.6 2.1 - 9.2 x10(3)/mcL    Mono # 0.47 0.1 - 1.3 x10(3)/mcL    Eos # 0.32 0 - 0.9 x10(3)/mcL    Baso # 0.06 0 - 0.2 x10(3)/mcL    IG# 0.07 (H) 0 - 0.04 x10(3)/mcL    IG% 0.6 %    NRBC% 0.0 %   Protime-INR    Collection Time: 07/25/22  1:07 PM   Result Value Ref Range    PT 12.6 seconds    INR 0.96 0.00 - 1.30         Follow Up:  Follow up in about 4 months (around 12/25/2022) for Follow-up.         This note was created with the assistance of a voice recognition software or phone dictation. There may be transcription errors as a result of using this technology however minimal. Effort has been made to assure accuracy of transcription but any obvious errors or omissions should be clarified with the author  of the document

## 2022-08-25 NOTE — ASSESSMENT & PLAN NOTE
Last hgbA1c 6.0 in February.   -Microalbumin July 2022- 5.1  -Foot exam 7/21, due next OV  Fundus 8 months ago in Eye clinic when eye exam done

## 2022-08-30 DIAGNOSIS — E11.9 TYPE 2 DIABETES MELLITUS WITHOUT COMPLICATION, WITHOUT LONG-TERM CURRENT USE OF INSULIN: Primary | ICD-10-CM

## 2022-08-31 RX ORDER — DAPAGLIFLOZIN 10 MG/1
10 TABLET, FILM COATED ORAL DAILY
Qty: 30 TABLET | Refills: 3 | Status: SHIPPED | OUTPATIENT
Start: 2022-08-31 | End: 2023-01-17 | Stop reason: SDUPTHER

## 2022-09-15 ENCOUNTER — HISTORICAL (OUTPATIENT)
Dept: ADMINISTRATIVE | Facility: HOSPITAL | Age: 37
End: 2022-09-15
Payer: MEDICAID

## 2022-09-20 ENCOUNTER — OFFICE VISIT (OUTPATIENT)
Dept: BEHAVIORAL HEALTH | Facility: CLINIC | Age: 37
End: 2022-09-20
Payer: MEDICAID

## 2022-09-20 DIAGNOSIS — F43.23 ADJUSTMENT DISORDER WITH MIXED ANXIETY AND DEPRESSED MOOD: Primary | ICD-10-CM

## 2022-09-20 PROCEDURE — 1159F MED LIST DOCD IN RCRD: CPT | Mod: CPTII,95,AF,HB | Performed by: STUDENT IN AN ORGANIZED HEALTH CARE EDUCATION/TRAINING PROGRAM

## 2022-09-20 PROCEDURE — 99213 OFFICE O/P EST LOW 20 MIN: CPT | Mod: 95,AF,HB, | Performed by: STUDENT IN AN ORGANIZED HEALTH CARE EDUCATION/TRAINING PROGRAM

## 2022-09-20 PROCEDURE — 1159F PR MEDICATION LIST DOCUMENTED IN MEDICAL RECORD: ICD-10-PCS | Mod: CPTII,95,AF,HB | Performed by: STUDENT IN AN ORGANIZED HEALTH CARE EDUCATION/TRAINING PROGRAM

## 2022-09-20 PROCEDURE — 1160F PR REVIEW ALL MEDS BY PRESCRIBER/CLIN PHARMACIST DOCUMENTED: ICD-10-PCS | Mod: CPTII,95,AF,HB | Performed by: STUDENT IN AN ORGANIZED HEALTH CARE EDUCATION/TRAINING PROGRAM

## 2022-09-20 PROCEDURE — 1160F RVW MEDS BY RX/DR IN RCRD: CPT | Mod: CPTII,95,AF,HB | Performed by: STUDENT IN AN ORGANIZED HEALTH CARE EDUCATION/TRAINING PROGRAM

## 2022-09-20 PROCEDURE — 99213 PR OFFICE/OUTPT VISIT, EST, LEVL III, 20-29 MIN: ICD-10-PCS | Mod: 95,AF,HB, | Performed by: STUDENT IN AN ORGANIZED HEALTH CARE EDUCATION/TRAINING PROGRAM

## 2022-09-20 RX ORDER — BUPROPION HYDROCHLORIDE 300 MG/1
300 TABLET ORAL DAILY
Qty: 30 TABLET | Refills: 5 | Status: SHIPPED | OUTPATIENT
Start: 2022-09-20 | End: 2023-06-28

## 2022-09-20 RX ORDER — HYDROXYZINE PAMOATE 25 MG/1
25 CAPSULE ORAL DAILY PRN
Qty: 30 CAPSULE | Refills: 5 | Status: SHIPPED | OUTPATIENT
Start: 2022-09-20 | End: 2023-03-28

## 2022-09-20 RX ORDER — FLUOXETINE HYDROCHLORIDE 20 MG/1
20 CAPSULE ORAL DAILY
Qty: 30 CAPSULE | Refills: 5 | Status: SHIPPED | OUTPATIENT
Start: 2022-09-20 | End: 2022-09-27 | Stop reason: SDUPTHER

## 2022-09-20 RX ORDER — BUSPIRONE HYDROCHLORIDE 15 MG/1
15 TABLET ORAL 2 TIMES DAILY
Qty: 60 TABLET | Refills: 5 | Status: SHIPPED | OUTPATIENT
Start: 2022-09-20 | End: 2023-06-28

## 2022-09-20 NOTE — PROGRESS NOTES
Outpatient Psychiatry Follow-Up Visit    9/20/2022    Clinical Status of Patient:  Outpatient (Ambulatory)    Chief Complaint:  Kinza Ford is a 36 y.o. female who presents today for follow-up of depression and anxiety. Patient last seen for follow-up on 6/20/2022. Met with patient.      TELE PSYCHIATRY Disclaimer   *The patient was informed despite using HIPPA compliant technology there may be risks including security breach, technological failure, inability to perform a comprehensive physical exam which could delay or prevent an accurate diagnosis, and potential complications from treatment decisions rendered over a telemedical platform. The patient understands and consented to the use of tele-health service as being a safe measure to mitigate during COVID 19 Pandemic .  The patient was also informed of the relationship between the physician and patient and the respective role of any other health care provider with respect to management of the patient; and notified that the pt may decline to receive medical services by telemedicine and may withdraw from such care at any time.     Patient's Current location: [address] [place] [address]; [facility]  In Case of Emergency pts next of kin  Name:Adam Ford  Phone number:  [phone]  Visit type: Virtual visit, audio only  Total time spent with patient: 15 minutes    Interval History and Content of Current Session:  Interim Events/Subjective Report/Content of Current Session:   Pt reports doing well since last visit.  Anxiety and mood symptoms well controlled.  Denies issues with sleep.  Appetite improved but has been having difficulty with weight gain and weight loss 2/2 thyroid issues.  Energy low.  Good concentration and motivation.  Denies SI/HI/AVH/paranoia, denies plan or desire for self harm or harm to others.  Good medication adherence, denies SE.  Pt happy with current regimen and wants to continue.     Psychiatric Review  "of Systems-is patient experiencing or having changes in  Sleep: good  Appetite: good  Weight: gaining weight recently  Energy: low  Motivation: good  Libido: denies problem  Anxiety: well controlled  Guilt/hopelessness: denies  Concentration: good   Irritability: denies  Paranoia/delusions: denies  SIB/risky behavior: denies    Review of Systems   PSYCHIATRIC: Pertinant items are noted in the narrative.  CONSTITUTIONAL: + weight gain, +weight loss.  MUSCULOSKELETAL: No pain or stiffness of the joints.  NEUROLOGIC: No weakness, sensory changes, seizures, confusion, memory loss, tremor or other abnormal movements.  RESPIRATORY: No shortness of breath.  Denies cough.   CARDIOVASCULAR: No tachycardia or chest pain.  GASTROINTESTINAL: No nausea, vomiting, pain, constipation, +diarrhea    Past Medical, Family and Social History: The patient's past medical, family and social history have been reviewed and updated as appropriate within the electronic medical record - see encounter notes.    Compliance: good    Side effects: Denies    Risk Parameters:  Patient reports no suicidal ideation  Patient reports no homicidal ideation  Patient reports no self-injurious behavior  Patient reports no violent behavior    Exam (detailed: at least 9 elements; comprehensive: all 15 elements)   Constitutional  Vitals:  Most recent vital signs, dated less than 90 days prior to this appointment, were reviewed.     No VS 2/2 virtual visit.        General:   Constitutional: No acute distress, appears stated age, casually dressed    Neurologic:   Motor: AMRIT 2/2 virtual visit  Gait: AMRIT 2/2 virtual visit    Mental status examination:   Appearance: AMRIT 2/2 virtual visit  Behavior: calm and cooperative  Mood: "calm"  Affect: AMRIT 2/2 virtual visit  Thought process: linear and goal directed  Associations: appropriate for conversation  Thought content: no plan or desire for self harm or harm to others, denies paranoia, no delusional ideation " volunteered  Perceptions: denies hallucinations or other altered perceptions  Orientation: oriented to day of week, month, year, location and situation  Language: English, fluid  Attention: able to attend to interview  Insight: good  Judgement: good    PHQ9 9/20/2022   Total Score 0     GAD7 9/20/2022 6/20/2022   1. Feeling nervous, anxious, or on edge? 1 2   2. Not being able to stop or control worrying? 0 0   3. Worrying too much about different things? 0 2   4. Trouble relaxing? 0 2   5. Being so restless that it is hard to sit still? 0 0   6. Becoming easily annoyed or irritable? 1 3   7. Feeling afraid as if something awful might happen? 0 3   8. If you checked off any problems, how difficult have these problems made it for you to do your work, take care of things at home, or get along with other people? 1 1   ANN-7 Score 2 12     Assessment and Diagnosis   Status/Progress: Based on the examination today, the patient's problem(s) is/are well controlled.  New problems have not been presented today.   Co-morbidities and Lack of compliance are not complicating management of the primary condition.       General Impression:    ICD-10-CM ICD-9-CM   1. Adjustment disorder with mixed anxiety and depressed mood  F43.23 309.28     Intervention/Counseling/Treatment Plan   Continue buspirone 15mg bid  Continue wellbutrin XL 300mg daily  Continue prozac 20mg daily  Continue hydroxyzine 25mg bid prn anxiety or insomnia  From 3/21/2022, CBC and CMP wnl, TSH wnl  No need for PEC as pt is not an imminent danger to self or others or gravely disabled due to acute psychiatric illness  Discussed that pt should either call clinic for psychiatric crisis symptoms or present to nearest emergency room    Discussed with patient informed consent including diagnosis, risks and benefits of proposed treatment above vs. alternative treatments vs. no treatment, as well as serious and common side effects of these treatments, and the inherent  unpredictability of individual responses to these treatments. The patient expresses understanding of the above and displays the capacity to agree with this current plan. Patient also agrees that, currently, the benefits outweigh the risks and would like to pursue treatment at this time, and had no other questions.    Instructions:  Take all medications as prescribed.    Abstain from recreational drugs and alcohol.  Present to ED or call 911 for SI/HI plan or intent, psychosis, or medical emergency.    Return to Clinic: Follow up in about 6 months (around 3/20/2023).  In person visit.     Total time: Total time spent with patient 20 minutes with >50% spent on counseling/coordination of care.     Dao Tillman MD  [facility]

## 2022-09-27 DIAGNOSIS — F43.23 ADJUSTMENT DISORDER WITH MIXED ANXIETY AND DEPRESSED MOOD: ICD-10-CM

## 2022-09-27 RX ORDER — FLUOXETINE HYDROCHLORIDE 20 MG/1
20 CAPSULE ORAL DAILY
Qty: 30 CAPSULE | Refills: 5 | Status: SHIPPED | OUTPATIENT
Start: 2022-09-27 | End: 2023-03-28

## 2022-11-01 ENCOUNTER — OFFICE VISIT (OUTPATIENT)
Dept: URGENT CARE | Facility: CLINIC | Age: 37
End: 2022-11-01
Payer: MEDICAID

## 2022-11-01 VITALS
HEART RATE: 77 BPM | HEIGHT: 65 IN | TEMPERATURE: 98 F | DIASTOLIC BLOOD PRESSURE: 87 MMHG | WEIGHT: 207 LBS | BODY MASS INDEX: 34.49 KG/M2 | OXYGEN SATURATION: 99 % | RESPIRATION RATE: 20 BRPM | SYSTOLIC BLOOD PRESSURE: 150 MMHG

## 2022-11-01 DIAGNOSIS — Z11.52 ENCOUNTER FOR SCREENING FOR COVID-19: Primary | ICD-10-CM

## 2022-11-01 DIAGNOSIS — J01.90 ACUTE SINUSITIS, RECURRENCE NOT SPECIFIED, UNSPECIFIED LOCATION: ICD-10-CM

## 2022-11-01 DIAGNOSIS — R09.81 SINUS CONGESTION: ICD-10-CM

## 2022-11-01 LAB
CTP QC/QA: YES
CTP QC/QA: YES
FLUAV AG NPH QL: NEGATIVE
FLUBV AG NPH QL: NEGATIVE
SARS-COV-2 RDRP RESP QL NAA+PROBE: NEGATIVE

## 2022-11-01 PROCEDURE — 99215 OFFICE O/P EST HI 40 MIN: CPT | Mod: PBBFAC | Performed by: FAMILY MEDICINE

## 2022-11-01 PROCEDURE — 99214 PR OFFICE/OUTPT VISIT, EST, LEVL IV, 30-39 MIN: ICD-10-PCS | Mod: S$PBB,,, | Performed by: FAMILY MEDICINE

## 2022-11-01 PROCEDURE — 87804 INFLUENZA ASSAY W/OPTIC: CPT | Mod: 59,PBBFAC | Performed by: FAMILY MEDICINE

## 2022-11-01 PROCEDURE — 87635 SARS-COV-2 COVID-19 AMP PRB: CPT | Mod: PBBFAC | Performed by: FAMILY MEDICINE

## 2022-11-01 PROCEDURE — 99214 OFFICE O/P EST MOD 30 MIN: CPT | Mod: S$PBB,,, | Performed by: FAMILY MEDICINE

## 2022-11-01 RX ORDER — BLOOD-GLUCOSE METER
EACH MISCELLANEOUS 2 TIMES DAILY
COMMUNITY
Start: 2022-10-13 | End: 2023-03-28 | Stop reason: SDUPTHER

## 2022-11-01 RX ORDER — MEDROXYPROGESTERONE ACETATE 10 MG/1
10 TABLET ORAL 2 TIMES DAILY
COMMUNITY
Start: 2022-09-28 | End: 2022-12-27

## 2022-11-01 RX ORDER — ALBUTEROL SULFATE 90 UG/1
1 AEROSOL, METERED RESPIRATORY (INHALATION) EVERY 6 HOURS PRN
COMMUNITY
Start: 2022-08-10 | End: 2024-02-10

## 2022-11-01 RX ORDER — LANCETS 33 GAUGE
EACH MISCELLANEOUS 2 TIMES DAILY
COMMUNITY
Start: 2022-10-13 | End: 2023-03-28 | Stop reason: SDUPTHER

## 2022-11-01 RX ORDER — AMOXICILLIN AND CLAVULANATE POTASSIUM 875; 125 MG/1; MG/1
1 TABLET, FILM COATED ORAL 2 TIMES DAILY
Qty: 20 TABLET | Refills: 0 | Status: SHIPPED | OUTPATIENT
Start: 2022-11-01 | End: 2022-11-11

## 2022-11-01 NOTE — PROGRESS NOTES
"Subjective:       Patient ID: Kinza Ford is a 37 y.o. female.    Vitals:  height is 5' 4.57" (1.64 m) and weight is 93.9 kg (207 lb 0.2 oz). Her temperature is 97.9 °F (36.6 °C). Her blood pressure is 150/87 (abnormal) and her pulse is 77. Her respiration is 20 and oxygen saturation is 99%.     Chief Complaint: Other Misc (Severe sinus infection - Entered by patient), Sinus Problem, and Headache    Sinus Problem  Associated symptoms include headaches.   Headache     Patient with several days of bilateral maxillary sinus pressure, headache, rhinorrhea.  No fever.  Has had sinus surgery in the past, gets frequent infections.  Denies cough.    Neurological:  Positive for headaches.     Constitutional: negative except as stated in HPI  Eye: negative except as stated in HPI  ENT: negative except as stated in HPI  Respiratory: negative except as stated in HPI  Cardiovascular: negative except as stated in HPI  Gastrointestinal: negative except as stated in HPI  Genitourinary: negative except as stated in HPI  Objective:      Physical Exam   Constitutional: She appears well-developed.   HENT:   Head: Atraumatic.   Nose: Rhinorrhea present. No purulent discharge. Right sinus exhibits maxillary sinus tenderness. Right sinus exhibits no frontal sinus tenderness. Left sinus exhibits no maxillary sinus tenderness and no frontal sinus tenderness.   Mouth/Throat: Oropharynx is clear and moist.   Eyes: Conjunctivae are normal.   Neck: Neck supple.   Cardiovascular: Regular rhythm.   Pulmonary/Chest: Effort normal and breath sounds normal.   Lymphadenopathy:     She has no cervical adenopathy.   Neurological: She is alert.   Skin: Skin is warm and dry.   Nursing note and vitals reviewed.      Results for orders placed or performed in visit on 11/01/22   POCT COVID-19 Rapid Screening   Result Value Ref Range    POC Rapid COVID Negative Negative     Acceptable Yes    POCT Influenza A/B   Result Value Ref " Range    Rapid Influenza A Ag Negative Negative    Rapid Influenza B Ag Negative Negative     Acceptable Yes        Assessment:       1. Encounter for screening for COVID-19    2. Sinus congestion    3. Acute sinusitis, recurrence not specified, unspecified location            Plan:         Encounter for screening for COVID-19  -     POCT COVID-19 Rapid Screening    Sinus congestion  -     POCT Influenza A/B    Acute sinusitis, recurrence not specified, unspecified location    Other orders  -     amoxicillin-clavulanate 875-125mg (AUGMENTIN) 875-125 mg per tablet; Take 1 tablet by mouth 2 (two) times daily. for 10 days  Dispense: 20 tablet; Refill: 0         Will treat as sinusitis.  Take antibiotics as prescribed.  Consider intranasal steroids, antihistamines, other over-the-counter medications to treat your symptoms.  Consider saline nasal rinses.  Please follow instructions on patient education material.  Return to urgent care in 2 to 3 days if symptoms are not improving, immediately if you develop any new or worsening symptoms.

## 2022-11-01 NOTE — LETTER
November 1, 2022      [facility]  [address] [facility] [address]  Phone: [phone]       Patient: Kinza Ford   YOB: 1985  Date of Visit: 11/01/2022    To Whom It May Concern:    Elijah Ford  was at [facility] on 11/01/2022. The patient may return to work/school on NOV 3 2022 with no restrictions. If you have any questions or concerns, or if I can be of further assistance, please do not hesitate to contact me.    Sincerely,    VINOD CHANDLER MD

## 2022-11-14 ENCOUNTER — OFFICE VISIT (OUTPATIENT)
Dept: URGENT CARE | Facility: CLINIC | Age: 37
End: 2022-11-14
Payer: MEDICAID

## 2022-11-14 VITALS
BODY MASS INDEX: 34.93 KG/M2 | WEIGHT: 209.69 LBS | TEMPERATURE: 98 F | HEIGHT: 65 IN | RESPIRATION RATE: 18 BRPM | OXYGEN SATURATION: 97 % | DIASTOLIC BLOOD PRESSURE: 76 MMHG | HEART RATE: 91 BPM | SYSTOLIC BLOOD PRESSURE: 114 MMHG

## 2022-11-14 DIAGNOSIS — R68.89 FLU-LIKE SYMPTOMS: ICD-10-CM

## 2022-11-14 DIAGNOSIS — R09.81 SINUS CONGESTION: Primary | ICD-10-CM

## 2022-11-14 DIAGNOSIS — Z20.828 EXPOSURE TO INFLUENZA: ICD-10-CM

## 2022-11-14 LAB
CTP QC/QA: YES
FLUAV AG NPH QL: NEGATIVE
FLUBV AG NPH QL: NEGATIVE

## 2022-11-14 PROCEDURE — 99213 PR OFFICE/OUTPT VISIT, EST, LEVL III, 20-29 MIN: ICD-10-PCS | Mod: S$PBB,,,

## 2022-11-14 PROCEDURE — 99215 OFFICE O/P EST HI 40 MIN: CPT | Mod: PBBFAC

## 2022-11-14 PROCEDURE — 87804 INFLUENZA ASSAY W/OPTIC: CPT | Mod: 59,PBBFAC

## 2022-11-14 PROCEDURE — 99213 OFFICE O/P EST LOW 20 MIN: CPT | Mod: S$PBB,,,

## 2022-11-14 RX ORDER — DEXAMETHASONE SODIUM PHOSPHATE 100 MG/10ML
8 INJECTION INTRAMUSCULAR; INTRAVENOUS
Status: COMPLETED | OUTPATIENT
Start: 2022-11-14 | End: 2022-11-14

## 2022-11-14 RX ADMIN — DEXAMETHASONE SODIUM PHOSPHATE 8 MG: 100 INJECTION INTRAMUSCULAR; INTRAVENOUS at 05:11

## 2022-11-14 NOTE — PROGRESS NOTES
"Subjective:       Patient ID: Kinza Ford is a 37 y.o. female.    Vitals:  height is 5' 4.57" (1.64 m) and weight is 95.1 kg (209 lb 11.2 oz). Her temperature is 98.2 °F (36.8 °C). Her blood pressure is 114/76 and her pulse is 91. Her respiration is 18 and oxygen saturation is 97%.     Chief Complaint: Nasal Congestion (Entered by patient) and Exposure to Influenza.    Pt states nasal congestion and sore throat. Pt was diagnosed and treated for a sinus infection 2 weeks ago, states sinus pressure has resolved but still having nasal congestion. Hx of sinus surgery.       Constitution: Negative.   HENT:  Positive for congestion.    Neck: neck negative.   Cardiovascular: Negative.    Eyes: Negative.    Respiratory: Negative.     Gastrointestinal: Negative.    Genitourinary: Negative.    Musculoskeletal: Negative.      Objective:      Physical Exam   Constitutional: She is oriented to person, place, and time. normal  HENT:   Head: Normocephalic.   Ears:   Right Ear: impacted cerumen  Left Ear: Tympanic membrane, external ear and ear canal normal.   Nose: Congestion present.   Mouth/Throat: Uvula is midline, oropharynx is clear and moist and mucous membranes are normal. Mucous membranes are moist. Oropharynx is clear.   Eyes: Pupils are equal, round, and reactive to light.   Cardiovascular: Normal rate, regular rhythm, normal heart sounds and normal pulses.   Pulmonary/Chest: Effort normal and breath sounds normal.   Abdominal: Normal appearance. Soft.   Musculoskeletal: Normal range of motion.         General: Normal range of motion.   Neurological: She is alert and oriented to person, place, and time.   Skin: Skin is warm and dry.   Vitals reviewed.      Results for orders placed or performed in visit on 11/14/22   POCT Influenza A/B   Result Value Ref Range    Rapid Influenza A Ag Negative Negative    Rapid Influenza B Ag Negative Negative     Acceptable Yes        Assessment:       1. Sinus " congestion    2. Exposure to influenza    3. Flu-like symptoms            Plan:         Sinus congestion  -     dexAMETHasone injection 8 mg  -     Ambulatory referral/consult to ENT    Exposure to influenza  -     POCT Influenza A/B    Flu-like symptoms  -     POCT Influenza A/B    - OTC cold/flu products as desired for symptoms  - Plenty of fluids    - Tylenol or Motrin for pain/fever    ER precautions given, pt verbalized understanding.     Please see provided patient education for guidance.

## 2022-11-14 NOTE — LETTER
November 14, 2022      [facility]  [facility] [facility] [address]  Phone: [phone]       Patient: Kinza Ford   YOB: 1985  Date of Visit: 11/14/2022    To Whom It May Concern:    Elijah Ford  was at [facility] on 11/14/2022. The patient may return to work/school on 11/16/22. If you have any questions or concerns, or if I can be of further assistance, please do not hesitate to contact me.    Sincerely,    ANG Rodriguez NP

## 2022-11-16 ENCOUNTER — OFFICE VISIT (OUTPATIENT)
Dept: URGENT CARE | Facility: CLINIC | Age: 37
End: 2022-11-16
Payer: MEDICAID

## 2022-11-16 VITALS
TEMPERATURE: 98 F | OXYGEN SATURATION: 98 % | SYSTOLIC BLOOD PRESSURE: 118 MMHG | HEIGHT: 65 IN | RESPIRATION RATE: 18 BRPM | WEIGHT: 207.38 LBS | HEART RATE: 81 BPM | BODY MASS INDEX: 34.55 KG/M2 | DIASTOLIC BLOOD PRESSURE: 82 MMHG

## 2022-11-16 DIAGNOSIS — R05.9 COUGH, UNSPECIFIED TYPE: ICD-10-CM

## 2022-11-16 DIAGNOSIS — H66.92 LEFT OTITIS MEDIA, UNSPECIFIED OTITIS MEDIA TYPE: Primary | ICD-10-CM

## 2022-11-16 DIAGNOSIS — Z11.52 ENCOUNTER FOR SCREENING FOR COVID-19: ICD-10-CM

## 2022-11-16 LAB
FLUAV AG UPPER RESP QL IA.RAPID: NOT DETECTED
FLUBV AG UPPER RESP QL IA.RAPID: NOT DETECTED
RSV A 5' UTR RNA NPH QL NAA+PROBE: NOT DETECTED
SARS-COV-2 RNA RESP QL NAA+PROBE: NOT DETECTED

## 2022-11-16 PROCEDURE — 99215 OFFICE O/P EST HI 40 MIN: CPT | Mod: PBBFAC | Performed by: NURSE PRACTITIONER

## 2022-11-16 PROCEDURE — 0241U COVID/RSV/FLU A&B PCR: CPT | Performed by: NURSE PRACTITIONER

## 2022-11-16 PROCEDURE — 99213 OFFICE O/P EST LOW 20 MIN: CPT | Mod: S$PBB,,, | Performed by: NURSE PRACTITIONER

## 2022-11-16 PROCEDURE — 99213 PR OFFICE/OUTPT VISIT, EST, LEVL III, 20-29 MIN: ICD-10-PCS | Mod: S$PBB,,, | Performed by: NURSE PRACTITIONER

## 2022-11-16 RX ORDER — PROMETHAZINE HYDROCHLORIDE AND DEXTROMETHORPHAN HYDROBROMIDE 6.25; 15 MG/5ML; MG/5ML
5 SYRUP ORAL EVERY 6 HOURS PRN
Qty: 100 ML | Refills: 0 | Status: SHIPPED | OUTPATIENT
Start: 2022-11-16 | End: 2022-11-23

## 2022-11-16 RX ORDER — METHYLPREDNISOLONE 4 MG/1
TABLET ORAL
Qty: 21 TABLET | Refills: 0 | Status: SHIPPED | OUTPATIENT
Start: 2022-11-16 | End: 2022-11-22

## 2022-11-16 RX ORDER — AMOXICILLIN AND CLAVULANATE POTASSIUM 875; 125 MG/1; MG/1
1 TABLET, FILM COATED ORAL EVERY 12 HOURS
Qty: 20 TABLET | Refills: 0 | Status: SHIPPED | OUTPATIENT
Start: 2022-11-16 | End: 2022-11-26

## 2022-11-16 NOTE — PROGRESS NOTES
"Subjective:       Patient ID: Kinza Ford is a 37 y.o. female.    Vitals:  height is 5' 4.57" (1.64 m) and weight is 94.1 kg (207 lb 6.4 oz). Her oral temperature is 97.8 °F (36.6 °C). Her blood pressure is 118/82 and her pulse is 81. Her respiration is 18 and oxygen saturation is 98%.     Chief Complaint: Cough (Feeling worse since last visit - Entered by patient)    Patient is a 37-year-old female, here today for cough, sinus pressure over the past 5 days.  Patient was seen on the 14th, given a steroid shot at that time which she states helped for 1 day.  States symptoms have gotten worse since that visit.      Constitution: Positive for fatigue.   HENT:  Positive for ear pain and congestion.    Neck: neck negative.   Cardiovascular: Negative.    Respiratory:  Positive for cough.      Objective:      Physical Exam   Constitutional: She is oriented to person, place, and time. She appears well-developed.   HENT:   Head: Normocephalic.   Ears:   Right Ear: Tympanic membrane normal.   Left Ear: A middle ear effusion is present.   Nose: Congestion present.   Mouth/Throat: Oropharynx is clear.   Eyes: Conjunctivae and EOM are normal. Pupils are equal, round, and reactive to light.   Neck: Neck supple.   Cardiovascular: Normal rate, regular rhythm and normal heart sounds.   Pulmonary/Chest: Effort normal and breath sounds normal.   Musculoskeletal: Normal range of motion.         General: Normal range of motion.   Neurological: She is alert and oriented to person, place, and time.   Skin: Skin is warm and dry.   Psychiatric: Her behavior is normal.   Vitals reviewed.      Assessment:       1. Left otitis media, unspecified otitis media type    2. Encounter for screening for COVID-19    3. Cough, unspecified type              No visits with results within 1 Day(s) from this visit.   Latest known visit with results is:   Office Visit on 11/14/2022   Component Date Value Ref Range Status    Rapid Influenza A Ag " 11/14/2022 Negative  Negative Final    Rapid Influenza B Ag 11/14/2022 Negative  Negative Final     Acceptable 11/14/2022 Yes   Final        No results found.   Plan:         COVID/rsv/flu PCR pending, will notify of abnormal results.  If you have symptoms, recommend home quarantine for 5 days until improvement in symptoms and fever free for 24 hours.  If any difficulty breathing, increased wheezing, shortness of breath or any new symptoms and immediately go to ER.      Left otitis media, unspecified otitis media type  -     amoxicillin-clavulanate 875-125mg (AUGMENTIN) 875-125 mg per tablet; Take 1 tablet by mouth every 12 (twelve) hours. for 10 days  Dispense: 20 tablet; Refill: 0  -     methylPREDNISolone (MEDROL DOSEPACK) 4 mg tablet; Take 6 tablets (24 mg total) by mouth once daily for 1 day, THEN 5 tablets (20 mg total) once daily for 1 day, THEN 4 tablets (16 mg total) once daily for 1 day, THEN 3 tablets (12 mg total) once daily for 1 day, THEN 2 tablets (8 mg total) once daily for 1 day, THEN 1 tablet (4 mg total) once daily for 1 day. use as directed.  Dispense: 21 tablet; Refill: 0    Encounter for screening for COVID-19  -     COVID/RSV/FLU A&B PCR; Future; Expected date: 11/16/2022    Cough, unspecified type  -     promethazine-dextromethorphan (PROMETHAZINE-DM) 6.25-15 mg/5 mL Syrp; Take 5 mLs by mouth every 6 (six) hours as needed (cough).  Dispense: 100 mL; Refill: 0  -     methylPREDNISolone (MEDROL DOSEPACK) 4 mg tablet; Take 6 tablets (24 mg total) by mouth once daily for 1 day, THEN 5 tablets (20 mg total) once daily for 1 day, THEN 4 tablets (16 mg total) once daily for 1 day, THEN 3 tablets (12 mg total) once daily for 1 day, THEN 2 tablets (8 mg total) once daily for 1 day, THEN 1 tablet (4 mg total) once daily for 1 day. use as directed.  Dispense: 21 tablet; Refill: 0

## 2022-11-16 NOTE — LETTER
November 16, 2022      [facility]  [facility] [facility] [address]  Phone: [phone]       Patient: Kinza Ford   YOB: 1985  Date of Visit: 11/16/2022    To Whom It May Concern:    Elijah Ford  was at [facility] on 11/16/2022. The patient may return to work/school upon receiving negative test results. with no restrictions. If you have any questions or concerns, or if I can be of further assistance, please do not hesitate to contact me.    Sincerely,    CARSON Spivey

## 2022-11-18 NOTE — PROGRESS NOTES
Audio Only Telehealth Visit     The patient location is: [facility]  The chief complaint leading to consultation is: sinus concerns  Visit type: Virtual visit with audio only (telephone)  Total time spent on visit: 40min     The reason for the audio only service rather than synchronous audio and video virtual visit was related to technical difficulties or patient preference/necessity.     Each patient to whom I provide medical services by telemedicine is:  (1) informed of the relationship between the physician and patient and the respective role of any other health care provider with respect to management of the patient; and (2) notified that they may decline to receive medical services by telemedicine and may withdraw from such care at any time. Patient verbally consented to receive this service via voice-only telephone call.       HPI: 7/23/21: 35yoF referred for thyroid nodules. Denies dysphagia, dysphonia, or SOB. Notes that she can see a lump when she looks at her neck. Paternal grandmother & sisters with thyroidectomies but she does not believe any were for cancer. Denies radiation exposure. Never smoker. She did have an FNA roughly 10 years ago which was benign but does not remember which side.    8/11/2021: Here today for follow-up after her IR biopsy. Biopsy of right thyroid nodule showing papillary thyroid carcinoma. She states that she been doing okay. She may be noticed a little bit more roughness to her voice as of late. No breathiness. No dysphagia or odynophagia. No breathing troubles. She has no family history of thyroid cancer, but has had multiple members of her family's with Hashimoto's and other thyroid issues requiring medicines. 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 She has a past medical history significant for diabetes as well as takes medication for her mood. No blood thinners. No  issues with neck extension or prior neck surgery. She has had gyn surgeries as well as sinus surgery in the past.     8/23/21: Total thyroidectomy pth 125, Ca 8.9    8/26/21: Doing ok since surgery. Still with R shoulder pain but improves with gabapentin. Still has sore throat. Has not tried singing at this time.     September 1, 2021: 35-year-old female presents today for follow-up after undergoing total thyroidectomy on August 23, 2021, for treatment of her papillary thyroid carcinoma. Patient presents today because of complaints of a sensation of a lump in her throat. She does find this to be bothersome. She is also noticed that she has some swelling involving the anterior portion of her neck. She is not having any problems with any difficulty swallowing at this time and does not have any hoarseness.  Final pathology report had returned showing the presence of a unifocal papillary thyroid carcinoma in the left lobe of the thyroid gland measuring 1.0 cm in size. There was no angioinvasion, lymphatic invasion, perineural invasion, or extrathyroidal extension. Tumor was completely excised. Also noted were some areas of nodular hyperplasia. Results were discussed with the patient.    3-23-22: Patient is follow-up for papillary thyroid carcinoma, T1NX0, 1 cm lesion intracapsular no evidence of spread either angioma or lymphatic and no perineural invasion. He needs to have less apparent fatigue.    11/21/22: Referred for sinus c/o. Lost to f/u for PTC. Needs thyroid u/s & labs. Has not had TFT since PCP adjusted synthroid in August. States she has intermittent tiredness, dry skin, hair breakage that will improve for a short time after synthroid adjustments. Had appt with Dr. Goodman in July that was cancelled because she had just started school. Endo appt r/s 9/2023. She c/o frequent sinus infections throughout the year which seem to be worsening over the past few years. Reports thes are always worst in the spring and  "fall. States that her eyes swell and cheeks get sore. Endorses nasal congestion, PND, pain/pressure around eyes/cheeks/nose, headaches. Denies hyposmia. Has recently completed multiple rounds of abx and steroids for a sinus infection. Reports that she finally is starting to feel that she is having some improvement. States her main c/o is a sensation of "ball of infection" between nose and palate. She has been using flonase and zyrtec daily for years, and uses saline spray prn. 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 She took immunotherapy in her teens and early 20's. She does remember it being helpful. She had a sinus surgery with Dr. Bryan around 10 years ago and is thinking she might need to have her sinuses "cleaned out" again. Denies dysphagia or type B symptoms. She has dysphonia with sinus infections and occasionally it will sound "gruff" since surgery but nothing consistent. She is a dai.     Labs:        Assessment and plan: 37yoF PMH [id number] PTC involving the left lobe of the thyroid gland status post total thyroidectomy on 8/23/21. Postsurgical hypothyroidism; has not rechecked level since PCP made dosage adjustment in August. Will reach out to endocrinology to see is she can be worked in sooner. Referred back to ENT for sinus c/o. Hx of immunotherapy and ESS.   - TSH, T4 free and thyroglobulin  - Thyroid u/s  - NSI BID & prn  - Continue flonase BID  - Add astelin BID  - Continue zyrtec  - Referral to allergist for consideration of immunotherapy  - Telemed 1-2 weeks to review labs      This service was not originating from a related E/M service provided within the previous 7 days nor will  to an E/M service or procedure within the next 24 hours or my soonest available appointment.  Prevailing standard of care was able to be met in this audio-only visit.      "

## 2022-11-20 ENCOUNTER — OFFICE VISIT (OUTPATIENT)
Dept: URGENT CARE | Facility: CLINIC | Age: 37
End: 2022-11-20
Payer: MEDICAID

## 2022-11-20 VITALS
HEART RATE: 76 BPM | SYSTOLIC BLOOD PRESSURE: 120 MMHG | WEIGHT: 207.44 LBS | HEIGHT: 64 IN | DIASTOLIC BLOOD PRESSURE: 83 MMHG | RESPIRATION RATE: 20 BRPM | OXYGEN SATURATION: 97 % | TEMPERATURE: 98 F | BODY MASS INDEX: 35.41 KG/M2

## 2022-11-20 DIAGNOSIS — H10.9 CONJUNCTIVITIS, UNSPECIFIED CONJUNCTIVITIS TYPE, UNSPECIFIED LATERALITY: Primary | ICD-10-CM

## 2022-11-20 PROCEDURE — 99213 PR OFFICE/OUTPT VISIT, EST, LEVL III, 20-29 MIN: ICD-10-PCS | Mod: S$PBB,,, | Performed by: FAMILY MEDICINE

## 2022-11-20 PROCEDURE — 99213 OFFICE O/P EST LOW 20 MIN: CPT | Mod: S$PBB,,, | Performed by: FAMILY MEDICINE

## 2022-11-20 PROCEDURE — 99215 OFFICE O/P EST HI 40 MIN: CPT | Mod: PBBFAC | Performed by: FAMILY MEDICINE

## 2022-11-20 RX ORDER — MOXIFLOXACIN 5 MG/ML
1 SOLUTION/ DROPS OPHTHALMIC 3 TIMES DAILY
Qty: 1.4 ML | Refills: 1 | Status: SHIPPED | OUTPATIENT
Start: 2022-11-20 | End: 2022-11-27

## 2022-11-20 NOTE — PROGRESS NOTES
"Subjective:       Patient ID: Kinza Ford is a 37 y.o. female.    Vitals:  height is 5' 4" (1.626 m) and weight is 94.1 kg (207 lb 7.3 oz). Her temperature is 98.1 °F (36.7 °C). Her blood pressure is 120/83 and her pulse is 76. Her respiration is 20 and oxygen saturation is 97%.     Chief Complaint: Other Misc (Swollen eye, continued sickness - Entered by patient/11/16 COVID/FLU/RSV PCR NEGATIVE) and Eye Problem (Rt eye with crusting noted x 1day)    Eye Problem     Patient presents to urgent care with several days of bilateral red eyes, right greater than left, crusting in the morning.  States she continues with sinus problems, awaiting ENT appointment.  ROS    Constitutional: negative except as stated in HPI  Eye: negative except as stated in HPI  ENT: negative except as stated in HPI  Respiratory: negative except as stated in HPI  Cardiovascular: negative except as stated in HPI  Gastrointestinal: negative except as stated in HPI  Genitourinary: negative except as stated in HPI  Objective:      Physical Exam   Constitutional: She appears well-developed.   HENT:   Head: Atraumatic.   Nose: Rhinorrhea present. No purulent discharge. Right sinus exhibits no maxillary sinus tenderness and no frontal sinus tenderness. Left sinus exhibits no maxillary sinus tenderness and no frontal sinus tenderness.   Mouth/Throat: Oropharynx is clear and moist.   Eyes: Lids are normal. Right eye exhibits no chemosis and no exudate. Left eye exhibits no chemosis and no exudate. Right conjunctiva is injected. Right conjunctiva has no hemorrhage. Left conjunctiva is injected. Left conjunctiva has no hemorrhage.   Neck: Neck supple.   Cardiovascular: Regular rhythm.   Pulmonary/Chest: Effort normal and breath sounds normal.   Lymphadenopathy:     She has no cervical adenopathy.   Neurological: She is alert.   Skin: Skin is warm and dry.   Nursing note and vitals reviewed.      Assessment:       1. Conjunctivitis, unspecified " conjunctivitis type, unspecified laterality            Plan:         Conjunctivitis, unspecified conjunctivitis type, unspecified laterality    Other orders  -     moxifloxacin (VIGAMOX) 0.5 % ophthalmic solution; Place 1 drop into both eyes 3 (three) times daily. Use in affected eye for 7 days  Dispense: 1.4 mL; Refill: 1         Avoid contact lens wearing.  Use Vigamox as prescribed.  Follow-up with ENT.  Return to urgent care if needed.

## 2022-11-21 ENCOUNTER — OFFICE VISIT (OUTPATIENT)
Dept: OTOLARYNGOLOGY | Facility: CLINIC | Age: 37
End: 2022-11-21
Payer: MEDICAID

## 2022-11-21 DIAGNOSIS — R44.8 FACIAL PRESSURE: ICD-10-CM

## 2022-11-21 DIAGNOSIS — J30.9 ALLERGIC RHINITIS, UNSPECIFIED SEASONALITY, UNSPECIFIED TRIGGER: ICD-10-CM

## 2022-11-21 DIAGNOSIS — C73 THYROID CANCER: Primary | ICD-10-CM

## 2022-11-21 DIAGNOSIS — R09.81 NASAL CONGESTION: ICD-10-CM

## 2022-11-21 DIAGNOSIS — J32.9 CHRONIC SINUSITIS, UNSPECIFIED LOCATION: ICD-10-CM

## 2022-11-21 DIAGNOSIS — E89.0 POSTOPERATIVE HYPOTHYROIDISM: ICD-10-CM

## 2022-11-21 PROCEDURE — 99215 PR OFFICE/OUTPT VISIT, EST, LEVL V, 40-54 MIN: ICD-10-PCS | Mod: 95,,, | Performed by: NURSE PRACTITIONER

## 2022-11-21 PROCEDURE — 1159F PR MEDICATION LIST DOCUMENTED IN MEDICAL RECORD: ICD-10-PCS | Mod: CPTII,95,, | Performed by: NURSE PRACTITIONER

## 2022-11-21 PROCEDURE — 99215 OFFICE O/P EST HI 40 MIN: CPT | Mod: 95,,, | Performed by: NURSE PRACTITIONER

## 2022-11-21 PROCEDURE — 1159F MED LIST DOCD IN RCRD: CPT | Mod: CPTII,95,, | Performed by: NURSE PRACTITIONER

## 2022-11-21 RX ORDER — AZELASTINE 1 MG/ML
1 SPRAY, METERED NASAL 2 TIMES DAILY
Qty: 30 ML | Refills: 5 | Status: SHIPPED | OUTPATIENT
Start: 2022-11-21 | End: 2023-11-21

## 2022-11-25 ENCOUNTER — LAB VISIT (OUTPATIENT)
Dept: LAB | Facility: HOSPITAL | Age: 37
End: 2022-11-25
Attending: NURSE PRACTITIONER
Payer: MEDICAID

## 2022-11-25 DIAGNOSIS — C73 THYROID CANCER: ICD-10-CM

## 2022-11-25 LAB
T4 FREE SERPL-MCNC: 0.84 NG/DL (ref 0.7–1.48)
TSH SERPL-ACNC: 21.52 UIU/ML (ref 0.35–4.94)

## 2022-11-25 PROCEDURE — 36415 COLL VENOUS BLD VENIPUNCTURE: CPT

## 2022-11-25 PROCEDURE — 84443 ASSAY THYROID STIM HORMONE: CPT

## 2022-11-25 PROCEDURE — 86800 THYROGLOBULIN ANTIBODY: CPT

## 2022-11-25 PROCEDURE — 84439 ASSAY OF FREE THYROXINE: CPT

## 2022-11-26 LAB
ENDOCRINOLOGIST REVIEW: ABNORMAL
THYROGLOB AB SERPL IA-ACNC: <1.8 IU/ML
THYROGLOB SERPL-MCNC: 4.8 NG/ML

## 2022-11-28 ENCOUNTER — OFFICE VISIT (OUTPATIENT)
Dept: OTOLARYNGOLOGY | Facility: CLINIC | Age: 37
End: 2022-11-28
Payer: MEDICAID

## 2022-11-28 DIAGNOSIS — E07.89 THYROID MASS OF UNCLEAR ETIOLOGY: ICD-10-CM

## 2022-11-28 DIAGNOSIS — C73 PAPILLARY CARCINOMA OF THYROID: Primary | ICD-10-CM

## 2022-11-28 DIAGNOSIS — E89.0 POSTOPERATIVE HYPOTHYROIDISM: ICD-10-CM

## 2022-11-28 PROCEDURE — 1159F PR MEDICATION LIST DOCUMENTED IN MEDICAL RECORD: ICD-10-PCS | Mod: CPTII,95,, | Performed by: NURSE PRACTITIONER

## 2022-11-28 PROCEDURE — 99215 OFFICE O/P EST HI 40 MIN: CPT | Mod: 95,,, | Performed by: NURSE PRACTITIONER

## 2022-11-28 PROCEDURE — 99215 PR OFFICE/OUTPT VISIT, EST, LEVL V, 40-54 MIN: ICD-10-PCS | Mod: 95,,, | Performed by: NURSE PRACTITIONER

## 2022-11-28 PROCEDURE — 1159F MED LIST DOCD IN RCRD: CPT | Mod: CPTII,95,, | Performed by: NURSE PRACTITIONER

## 2022-11-28 NOTE — PROGRESS NOTES
Audio Only Telehealth Visit     The patient location is: [facility]  The chief complaint leading to consultation is: sinus concerns  Visit type: Virtual visit with audio only (telephone)  Total time spent on visit: 40min     The reason for the audio only service rather than synchronous audio and video virtual visit was related to technical difficulties or patient preference/necessity.     Each patient to whom I provide medical services by telemedicine is:  (1) informed of the relationship between the physician and patient and the respective role of any other health care provider with respect to management of the patient; and (2) notified that they may decline to receive medical services by telemedicine and may withdraw from such care at any time. Patient verbally consented to receive this service via voice-only telephone call.       HPI: 7/23/21: 35yoF referred for thyroid nodules. Denies dysphagia, dysphonia, or SOB. Notes that she can see a lump when she looks at her neck. Paternal grandmother & sisters with thyroidectomies but she does not believe any were for cancer. Denies radiation exposure. Never smoker. She did have an FNA roughly 10 years ago which was benign but does not remember which side.    8/11/2021: Here today for follow-up after her IR biopsy. Biopsy of right thyroid nodule showing papillary thyroid carcinoma. She states that she been doing okay. She may be noticed a little bit more roughness to her voice as of late. No breathiness. No dysphagia or odynophagia. No breathing troubles. She has no family history of thyroid cancer, but has had multiple members of her family's with Hashimoto's and other thyroid issues requiring medicines. 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 She has a past medical history significant for diabetes as well as takes medication for her mood. No blood thinners. No  issues with neck extension or prior neck surgery. She has had gyn surgeries as well as sinus surgery in the past.     8/23/21: Total thyroidectomy pth 125, Ca 8.9    8/26/21: Doing ok since surgery. Still with R shoulder pain but improves with gabapentin. Still has sore throat. Has not tried singing at this time.     September 1, 2021: 35-year-old female presents today for follow-up after undergoing total thyroidectomy on August 23, 2021, for treatment of her papillary thyroid carcinoma. Patient presents today because of complaints of a sensation of a lump in her throat. She does find this to be bothersome. She is also noticed that she has some swelling involving the anterior portion of her neck. She is not having any problems with any difficulty swallowing at this time and does not have any hoarseness.  Final pathology report had returned showing the presence of a unifocal papillary thyroid carcinoma in the left lobe of the thyroid gland measuring 1.0 cm in size. There was no angioinvasion, lymphatic invasion, perineural invasion, or extrathyroidal extension. Tumor was completely excised. Also noted were some areas of nodular hyperplasia. Results were discussed with the patient.    3-23-22: Patient is follow-up for papillary thyroid carcinoma, T1NX0, 1 cm lesion intracapsular no evidence of spread either angioma or lymphatic and no perineural invasion. He needs to have less apparent fatigue.    11/21/22: Referred for sinus c/o. Lost to f/u for PTC. Needs thyroid u/s & labs. Has not had TFT since PCP adjusted synthroid in August. States she has intermittent tiredness, dry skin, hair breakage that will improve for a short time after synthroid adjustments. Had appt with Dr. Goodman in July that was cancelled because she had just started school. Endo appt r/s 9/2023. She c/o frequent sinus infections throughout the year which seem to be worsening over the past few years. Reports thes are always worst in the spring and  "fall. States that her eyes swell and cheeks get sore. Endorses nasal congestion, PND, pain/pressure around eyes/cheeks/nose, headaches. Denies hyposmia. Has recently completed multiple rounds of abx and steroids for a sinus infection. Reports that she finally is starting to feel that she is having some improvement. States her main c/o is a sensation of "ball of infection" between nose and palate. She has been using flonase and zyrtec daily for years, and uses saline spray prn. 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 She took immunotherapy in her teens and early 20's. She does remember it being helpful. She had a sinus surgery with Dr. Bryan around 10 years ago and is thinking she might need to have her sinuses "cleaned out" again. Denies dysphagia or type B symptoms. She has dysphonia with sinus infections and occasionally it will sound "gruff" since surgery but nothing consistent. She is a dai.     11/28/22: F/u after labs and u/s. Area concerning for scarring/granulation vs residual thyroid tissue on u/s. Additionally, her thyroglobulin is elevated & TSH trending up despite increase in synthroid by PCP in August. Endorses appropriate synthroid administration.    Labs:       Imaging:        Assessment and plan: 37yoF PMH [id number] PTC involving the left lobe of the thyroid gland status post total thyroidectomy on 8/23/21. Postsurgical hypothyroidism with difficulty managing. TSH increased to 21.5, Thyroglobulin 4.8, U/s with area of tissue concerning for residual thyroid vs scarring/granulation- reviewed all with pt & Kevin Stuart & Prateek. D/w Dr. Goodman for recs. Referred back to ENT for sinus c/o. Hx of immunotherapy and ESS.   - Increase synthroid to 175mcg  - Repeat TSH in 4-6 weeks  - Referral to IR for FNA of tissue in thyroid bed  - Referral to [facility] endocrinology in case they can get pt in sooner  - NSI BID & prn  - Continue flonase BID  - Add astelin BID  - Continue " zyrtec  - Referral to allergist for consideration of immunotherapy  - RTC 4-6 weeks Res      This service was not originating from a related E/M service provided within the previous 7 days nor will  to an E/M service or procedure within the next 24 hours or my soonest available appointment.  Prevailing standard of care was able to be met in this audio-only visit.

## 2022-11-29 RX ORDER — LEVOTHYROXINE SODIUM 175 UG/1
175 TABLET ORAL
Qty: 30 TABLET | Refills: 11 | Status: SHIPPED | OUTPATIENT
Start: 2022-11-29 | End: 2023-01-19 | Stop reason: SDUPTHER

## 2022-12-09 ENCOUNTER — TELEPHONE (OUTPATIENT)
Dept: INTERVENTIONAL RADIOLOGY/VASCULAR | Facility: HOSPITAL | Age: 37
End: 2022-12-09
Payer: MEDICAID

## 2022-12-09 NOTE — TELEPHONE ENCOUNTER
Good morning, so this patient had her updated CT on 12/2/22 to evaluate the thyroid bed to see if there was anything to target for biopsy. Dr. Redman reviewed the imaging along with Dr. Markham and it was decided that there is nothing to biopsy. Dr. Redman stated the residual tissue that was causing concern was likely artifact on the prior US.

## 2022-12-12 ENCOUNTER — OFFICE VISIT (OUTPATIENT)
Dept: ORTHOPEDICS | Facility: CLINIC | Age: 37
End: 2022-12-12
Payer: MEDICAID

## 2022-12-12 VITALS
SYSTOLIC BLOOD PRESSURE: 115 MMHG | DIASTOLIC BLOOD PRESSURE: 81 MMHG | HEIGHT: 64 IN | WEIGHT: 206 LBS | BODY MASS INDEX: 35.17 KG/M2

## 2022-12-12 DIAGNOSIS — M65.341 TRIGGER RING FINGER OF RIGHT HAND: Primary | ICD-10-CM

## 2022-12-12 PROCEDURE — 99214 OFFICE O/P EST MOD 30 MIN: CPT | Mod: PBBFAC

## 2022-12-12 NOTE — PROGRESS NOTES
"Subjective:    Patient ID: Kinza Ford is a right handed 37 y.o. female  who presented to [facility] for follow up..    Chief Complaint: Pain of the Right Hand    History of Present Illness:  Kinza Ford who has a history of R 4th digit trigger finger  presented today with trigger finger ring finger involving the right hand.   Pain is located at the 4th flexor tendon nodule. Quality of pain is described as aching.  Inciting event: none known.  Pain is aggravated by all activities . Evaluation to date: plain films. Treatment to date: oral analgesics, CSI, PT, and home exercises. Patient states that the pain started January of 2022.  At that point she was seen and had a corticosteroid injection.  She was then subsequently seen in July of 2022 for return of pain and another injection was done at that point.  She is getting about 3 months' worth of relief from steroid injections.  Patient now requesting a more definitive treatment. Currently rates the pain a 7/10.   Discussed treatment options at length with patient and she is electing for surgical release of her trigger finger.  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 PCP is:CARSON Tsang.    Hand Review of Systems:  Swelling?  no  Clicking?  yes  Limited ROM? no  Fever/Chills? no  Numbness/Tingling? no  Weakness? no    Current Choice of Exercise:  none     Objective:    Physical Exam:  /81   Ht 5' 4" (1.626 m)   Wt 93.4 kg (206 lb)   BMI 35.36 kg/m²     Appearance:  Soft tissue swelling: Left: no Right: no  Effusion: Left:  Negative Right: Negative  Erythema: Left no Right: no  Ecchymosis: Left: no Right: no  Atrophy: Left: no Right: no    Palpation:  Hand/wrist Tenderness: Left: none  Right: palpable nodule on the right ring finger flexor tendon    Range of motion:  Flexion (0-80): Left:  80 Right: 80  Extension (0-70): Left:  70 Right: 70  Ulnar deviation " (0-30): 30 Right: 30  Radial deviation (0-20): 20 Right: 20  Supination (0-90): Left: 90 Right: 90  Pronation (0-90): Left: 90 Right: 90  Able to make a power fist and claw hand: on Both hand(s)  Distal palmar crease-finger tip distance: 0 on Both hand(s)    Strength:  Flexion: Left: 5/5 Pain: no Right: 5/5 Pain: no  Extension: Left: 5/5 Pain: no Right: 5/5 Pain: no  Supination: Left: 5/5 Pain: no Right: 5/5 Pain: no  Pronation: Left: 5/5 Pain: no Right: 5/5 Pain: no  Ulnar deviation: Left: 5/5 Pain: no Right: 5/5 Pain: no  Radial deviation: Left: 5/5 Pain: no Right: 5/5 Pain: no    Special Tests:  Durkans Test (Carpal Compression test): Left: Negative  Right: Negative  Tinels:  Left: Negative  Right: Negative    Phalens: Left: Negative  Right: Negative    Finkelstein's Test: Left: Negative Right: Negative    Froments: Left: Not performed Right: Not performed  Shuck Test: Left: Not performed Right: Not performed    AIN/PIN/Radial nerve: Intact and symmetric    General appearance: NAD  Peripheral pulses: normal bilaterally   Reflexes: Left: Not performed Right: Not performed   Sensation: normal    Labs:  Last A1c: 6.0     Imaging:   Previous images reviewed.  X-rays ordered and performed today: NO  # of views: 3 Laterality: right  My Interpretation:  no fracture, dislocation, swelling or degenerative changes noted      Assessment:      Encounter Diagnoses   Code Name Primary?    M65.341 Trigger ring finger of right hand Yes      Plan:    Dx: right. trigger finger ring finger Acute in moderate exacerbation.   Treatment Plan: Discussed with patient diagnosis and treatment recommendations.  - Patient has failed conservative management with HEP, NSAIDS/Tylenol and CSI. Patient now requesting surgical options   - Refer to Orthopedics for trigger finger release  Imaging: prior radiological studies independently reviewed; discussed with patient; agree with radiologist interpretation.   Procedure: Discussed CSI/VSI as  treatment options; Patient declines a CSI at this time  Activity: Activity as tolerated  Therapy: No formal therapy  Medication: first line treatment with daily acetaminophen. Up to 1000 mg three times daily can be taken; medication precautions given.. Please see your primary care physician for further refills.  RTC: PRN; call if any issues.         Gayla Blas

## 2022-12-13 ENCOUNTER — TELEPHONE (OUTPATIENT)
Dept: ENDOCRINOLOGY | Facility: CLINIC | Age: 37
End: 2022-12-13
Payer: MEDICAID

## 2022-12-13 DIAGNOSIS — E11.9 TYPE 2 DIABETES MELLITUS WITHOUT COMPLICATION, WITHOUT LONG-TERM CURRENT USE OF INSULIN: ICD-10-CM

## 2022-12-13 DIAGNOSIS — C73 THYROID CA: Primary | ICD-10-CM

## 2022-12-13 NOTE — TELEPHONE ENCOUNTER
Orders signed, please notify pt if not already done.   impairments found/functional limitations in following categories/anticipated discharge recommendation

## 2022-12-13 NOTE — TELEPHONE ENCOUNTER
Received message from ENT department regarding pt.  Has hx of low risk thyroid cancer s/p thyroidectomy w/ rising tg but also rising TSH.  At recent visit, LT4 was increased and pt referred after rads couldn't find a concerning lesion on imaging for FNA.    Jojo/Nora/Nanette, please sort an initial visit in my clinic with this patient in one of my spots in late January/early February.    Labs the week prior including renal panel, vit d, PTH, TSH, thyroglobulin panel, hga1c, c-peptide, urine microalb/cr, lipid panel.

## 2022-12-14 NOTE — TELEPHONE ENCOUNTER
Spoke with patient informed of appointment date and time as well as need to have labs done prior to appointment.  Questions answered voiced understanding.  Appointment card and lab orders mailed to patient.

## 2022-12-15 ENCOUNTER — HOSPITAL ENCOUNTER (EMERGENCY)
Facility: HOSPITAL | Age: 37
Discharge: HOME | End: 2022-12-15
Attending: STUDENT IN AN ORGANIZED HEALTH CARE EDUCATION/TRAINING PROGRAM
Payer: MEDICAID

## 2022-12-15 VITALS
RESPIRATION RATE: 16 BRPM | OXYGEN SATURATION: 98 % | SYSTOLIC BLOOD PRESSURE: 134 MMHG | TEMPERATURE: 98 F | HEART RATE: 99 BPM | DIASTOLIC BLOOD PRESSURE: 100 MMHG
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
## 2022-12-15 DIAGNOSIS — S80.862A INSECT BITE OF LEFT LOWER LEG, INITIAL ENCOUNTER: Primary | ICD-10-CM

## 2022-12-15 PROCEDURE — 99283 EMERGENCY DEPT VISIT LOW MDM: CPT

## 2022-12-15 RX ORDER — CLINDAMYCIN HYDROCHLORIDE 150 MG/1
300 CAPSULE ORAL 4 TIMES DAILY
Qty: 80 CAPSULE | Refills: 0 | Status: SHIPPED | OUTPATIENT
Start: 2022-12-15 | End: 2022-12-25

## 2022-12-15 NOTE — ED PROVIDER NOTES
Encounter Date: 2022       History     Chief Complaint   Patient presents with    Leg Pain     Left lower leg pain/redness/swelling onset yesterday (). Denies trauma. Ambulatory      Patient is a 37-year-old female  that presents with small area of redness to left lower leg that has been present 2 days. Associated symptoms nothing. Surrounding information is nothing. Exacerbated by nothing. Relieved by nothing. Patient treatment prior to arrival none. Risk factors include none. Other history pertaining to this complaint nothing.       The history is provided by the patient. No  was used.   Review of patient's allergies indicates:   Allergen Reactions    Adhesive      Other reaction(s): Burn    Sulfa (sulfonamide antibiotics)      Other reaction(s): Unknown    Sulfamethoxazole      Other reaction(s): Rash     Past Medical History:   Diagnosis Date    Acquired hypothyroidism     DM2 (diabetes mellitus, type 2)     Hx of papillary thyroid carcinoma     IBS (irritable bowel syndrome)     Migraines     Mixed hyperlipidemia      Past Surgical History:   Procedure Laterality Date     SECTION      COLONOSCOPY      ESOPHAGOGASTRODUODENOSCOPY      THYROIDECTOMY      TONSILLECTOMY      WISDOM TOOTH EXTRACTION       Family History   Problem Relation Age of Onset    No Known Problems Mother     Crohn's disease Father     Diabetes Mellitus Father      Social History     Tobacco Use    Smoking status: Never    Smokeless tobacco: Never   Substance Use Topics    Alcohol use: Not Currently    Drug use: Never     Review of Systems   Constitutional:  Negative for fever.   Respiratory:  Negative for cough and shortness of breath.    Cardiovascular:  Negative for chest pain.   Gastrointestinal:  Negative for abdominal pain.   Genitourinary:  Negative for difficulty urinating and dysuria.   Musculoskeletal:  Negative for gait problem.   Skin:  Negative for color change.   Neurological:  Negative for  dizziness, speech difficulty and headaches.   Psychiatric/Behavioral:  Negative for hallucinations and suicidal ideas.    All other systems reviewed and are negative.    Physical Exam     Initial Vitals [12/15/22 0021]   BP Pulse Resp Temp SpO2   (!) 134/100 99 16 98.4 °F (36.9 °C) 98 %      MAP       --         Physical Exam    Nursing note and vitals reviewed.  Constitutional: She appears well-developed and well-nourished.   HENT:   Head: Normocephalic.   Eyes: EOM are normal.   Neck:   Normal range of motion.  Cardiovascular:  Normal rate, regular rhythm, normal heart sounds and intact distal pulses.           Pulmonary/Chest: Breath sounds normal. No respiratory distress.   Abdominal: Abdomen is soft. Bowel sounds are normal. There is no abdominal tenderness.   Musculoskeletal:         General: Normal range of motion.      Cervical back: Normal range of motion.     Neurological: She is alert and oriented to person, place, and time. She has normal strength.   Skin: Skin is warm and dry.   Insect bite to the left lateral lower leg   Psychiatric: She has a normal mood and affect. Her behavior is normal. Judgment and thought content normal.       ED Course   Procedures  Labs Reviewed - No data to display       Imaging Results    None          Medications - No data to display                           Clinical Impression:   Final diagnoses:  [S80.862A, W57.XXXA] Insect bite of left lower leg, initial encounter (Primary)        ED Disposition Condition    Discharge Stable          ED Prescriptions       Medication Sig Dispense Start Date End Date Auth. Provider    clindamycin (CLEOCIN) 150 MG capsule Take 2 capsules (300 mg total) by mouth 4 (four) times daily. for 10 days 80 capsule 12/15/2022 12/25/2022 CARSON Neri          Follow-up Information       Follow up With Specialties Details Why Contact Info    Your Primary Care Provider  Call in 3 days ed follow up              CARSON Neri  12/15/22  [phone]

## 2022-12-21 ENCOUNTER — OFFICE VISIT (OUTPATIENT)
Dept: ORTHOPEDICS | Facility: CLINIC | Age: 37
End: 2022-12-21
Payer: MEDICAID

## 2022-12-21 DIAGNOSIS — M65.341 TRIGGER RING FINGER OF RIGHT HAND: Primary | ICD-10-CM

## 2022-12-21 DIAGNOSIS — M65.30 TRIGGER FINGER: ICD-10-CM

## 2022-12-21 PROCEDURE — 1159F MED LIST DOCD IN RCRD: CPT | Mod: CPTII,,, | Performed by: ORTHOPAEDIC SURGERY

## 2022-12-21 PROCEDURE — 99204 OFFICE O/P NEW MOD 45 MIN: CPT | Mod: 57,S$PBB,, | Performed by: ORTHOPAEDIC SURGERY

## 2022-12-21 PROCEDURE — 99214 OFFICE O/P EST MOD 30 MIN: CPT | Mod: PBBFAC

## 2022-12-21 PROCEDURE — 1159F PR MEDICATION LIST DOCUMENTED IN MEDICAL RECORD: ICD-10-PCS | Mod: CPTII,,, | Performed by: ORTHOPAEDIC SURGERY

## 2022-12-21 PROCEDURE — 99204 PR OFFICE/OUTPT VISIT, NEW, LEVL IV, 45-59 MIN: ICD-10-PCS | Mod: 57,S$PBB,, | Performed by: ORTHOPAEDIC SURGERY

## 2022-12-21 RX ORDER — SODIUM CHLORIDE 9 MG/ML
INJECTION, SOLUTION INTRAVENOUS CONTINUOUS
Status: CANCELLED | OUTPATIENT
Start: 2022-12-21

## 2022-12-21 RX ORDER — MUPIROCIN 20 MG/G
OINTMENT TOPICAL
Status: CANCELLED | OUTPATIENT
Start: 2022-12-21

## 2022-12-21 NOTE — PROGRESS NOTES
[facility]  New Patient Office Visit  2022       Patient ID: Kinza Ford  YOB: 1985  MRN: [id number]    Diagnosis:    There were no encounter diagnoses.     Chief Complaint: Pain of the Right Hand      Kinza Frod is a 37 y.o. female who presents as a new patient for treatment of the above mentioned diagnosis.  Patient presents today for follow-up of right ring finger trigger finger.  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  She states since that time she is had many events where the finger gets stuck and she has to physically pry it open to extend it.  She had an injection in April and another one in July that did provide her some relief but the symptoms have returned.  She is interested in surgical management.    Of note, patient has history of thyroid cancer that was previously in remission and is currently being worked up for a possible new mass.  States they are unsure if new mass or just thyroid tissue.  She also has diabetes and states her A1c is 6.    Occupation:    Sports/Hobbies:    Hand dominance: right handed  Smoking: n/a    Past Medical History:    Past Medical History:   Diagnosis Date    Acquired hypothyroidism     DM2 (diabetes mellitus, type 2)     Hx of papillary thyroid carcinoma     IBS (irritable bowel syndrome)     Migraines     Mixed hyperlipidemia      Past Surgical History:   Procedure Laterality Date     SECTION      COLONOSCOPY      ESOPHAGOGASTRODUODENOSCOPY      THYROIDECTOMY      TONSILLECTOMY      WISDOM TOOTH EXTRACTION       Family History   Problem Relation Age of Onset    No Known Problems Mother     Crohn's disease Father     Diabetes Mellitus Father      Social History     Socioeconomic History    Marital status:    Tobacco Use    Smoking status: Never    Smokeless tobacco: Never   Substance and Sexual Activity    Alcohol use: Not Currently    Drug  use: Never    Sexual activity: Yes     Medication List with Changes/Refills   Current Medications    ALBUTEROL (PROVENTIL/VENTOLIN HFA) 90 MCG/ACTUATION INHALER    1 puff every 6 (six) hours as needed. As needed for wheezing.    AZELASTINE (ASTELIN) 137 MCG (0.1 %) NASAL SPRAY    1 spray (137 mcg total) by Nasal route 2 (two) times daily.    BUPROPION (WELLBUTRIN XL) 300 MG 24 HR TABLET    Take 1 tablet (300 mg total) by mouth once daily.    BUSPIRONE (BUSPAR) 15 MG TABLET    Take 1 tablet (15 mg total) by mouth 2 (two) times a day.    CETIRIZINE (ZYRTEC) 10 MG TABLET    one tablet    CHOLECALCIFEROL, VITAMIN D3, (VITAMIN D3) 50 MCG (2,000 UNIT) CAP CAPSULE    1 capsule    CLINDAMYCIN (CLEOCIN) 150 MG CAPSULE    Take 2 capsules (300 mg total) by mouth 4 (four) times daily. for 10 days    DAPAGLIFLOZIN (FARXIGA) 10 MG TABLET    Take 1 tablet (10 mg total) by mouth once daily at 6am.    FLUOXETINE 20 MG CAPSULE    Take 1 capsule (20 mg total) by mouth once daily.    GABAPENTIN (NEURONTIN) 300 MG CAPSULE    1 capsule for pain    HYDROXYZINE PAMOATE (VISTARIL) 25 MG CAP    Take 1 capsule (25 mg total) by mouth daily as needed (anxiety or panic attack).    LEVOTHYROXINE (SYNTHROID, LEVOTHROID) 175 MCG TABLET    Take 1 tablet (175 mcg total) by mouth before breakfast.    MEDROXYPROGESTERONE (PROVERA) 10 MG TABLET    Take 10 mg by mouth 2 (two) times daily.    MELOXICAM (MOBIC) 15 MG TABLET    Take 15 mg by mouth daily as needed.    METFORMIN (GLUCOPHAGE-XR) 500 MG ER 24HR TABLET    Take 1,000 mg by mouth 2 (two) times daily.    ONETOUCH DELICA PLUS LANCET 33 GAUGE MISC    Apply topically 2 (two) times daily.    ONETOUCH VERIO TEST STRIPS STRP    2 (two) times daily.    SPIRONOLACTONE (ALDACTONE) 50 MG TABLET    1 tablet     Review of patient's allergies indicates:   Allergen Reactions    Adhesive      Other reaction(s): Burn    Sulfa (sulfonamide antibiotics)      Other reaction(s): Unknown    Sulfamethoxazole      Other  reaction(s): Rash       ROS:    There is no height or weight on file to calculate BMI.  GENERAL: Well appearing, appropriate for stated age, no acute distress.  CARDIOVASCULAR: Pulses regular by peripheral palpation.  PULMONARY: Respirations are even and non-labored.  NEURO: Awake, alert, and oriented x 3.  PSYCH: Mood & affect are appropriate.  HEENT: Head is normocephalic and atraumatic.    Physical Exam:    Right upper extremity  No wounds or abrasions  Tenderness to palpation over the A1 pulley of the ring finger   No tenderness to over the A1 pulley of the small finger, long finger, index finger, or thumb   No triggering on today on exam   Patient able to make full fist and fully extend the finger today  Small tender nodule over the A1 pulley of the ring finger   Negative Durkan's and Tinel's at the wrist  Negative Finkelstein  Negative CMC grind   Negative Tinel's at the elbow       Imaging:    No image results found.     Relevant imaging results reviewed and interpreted by me, discussed with the patient and / or family today. No new imaging    Assessment and Plan:    Kinza Ford is a 37 y.o. female seen in the office today for There were no encounter diagnoses.  37-year-old female with history of thyroid cancer and diabetes presenting for right ring finger trigger finger    Risks, benefits, and alternatives discussed with the patient.  Patient would like to proceed with operative management as she is failed conservative management with injections and it affects her work.  We will schedule her for right ring finger A1 pulley release on 1/5/22 with Dr. Casas.  Case request submitted.  Consent obtained.  Follow-up for surgery.  Weightbearing as tolerated.

## 2022-12-27 ENCOUNTER — OFFICE VISIT (OUTPATIENT)
Dept: FAMILY MEDICINE | Facility: CLINIC | Age: 37
End: 2022-12-27
Payer: MEDICAID

## 2022-12-27 VITALS
RESPIRATION RATE: 18 BRPM | BODY MASS INDEX: 34.69 KG/M2 | SYSTOLIC BLOOD PRESSURE: 136 MMHG | TEMPERATURE: 98 F | WEIGHT: 203.19 LBS | OXYGEN SATURATION: 99 % | DIASTOLIC BLOOD PRESSURE: 83 MMHG | HEIGHT: 64 IN | HEART RATE: 90 BPM

## 2022-12-27 DIAGNOSIS — E55.9 VITAMIN D DEFICIENCY: ICD-10-CM

## 2022-12-27 DIAGNOSIS — E89.0 POSTOPERATIVE HYPOTHYROIDISM: ICD-10-CM

## 2022-12-27 DIAGNOSIS — K76.0 STEATOSIS OF LIVER: ICD-10-CM

## 2022-12-27 DIAGNOSIS — J45.20 MILD INTERMITTENT ASTHMA, UNSPECIFIED WHETHER COMPLICATED: ICD-10-CM

## 2022-12-27 DIAGNOSIS — F43.23 ADJUSTMENT DISORDER WITH MIXED ANXIETY AND DEPRESSED MOOD: Primary | ICD-10-CM

## 2022-12-27 DIAGNOSIS — E11.9 TYPE 2 DIABETES MELLITUS WITHOUT COMPLICATION, WITHOUT LONG-TERM CURRENT USE OF INSULIN: ICD-10-CM

## 2022-12-27 DIAGNOSIS — E78.5 HYPERLIPIDEMIA, UNSPECIFIED HYPERLIPIDEMIA TYPE: ICD-10-CM

## 2022-12-27 DIAGNOSIS — Z00.00 ENCOUNTER FOR WELLNESS EXAMINATION: ICD-10-CM

## 2022-12-27 PROBLEM — T14.8XXA BRUISING: Status: RESOLVED | Noted: 2022-07-25 | Resolved: 2022-12-27

## 2022-12-27 LAB
ALBUMIN SERPL-MCNC: 4.4 G/DL (ref 3.5–5)
ALBUMIN/GLOB SERPL: 1.1 RATIO (ref 1.1–2)
ALP SERPL-CCNC: 83 UNIT/L (ref 40–150)
ALT SERPL-CCNC: 86 UNIT/L (ref 0–55)
APPEARANCE UR: CLEAR
AST SERPL-CCNC: 34 UNIT/L (ref 5–34)
BACTERIA #/AREA URNS AUTO: ABNORMAL /HPF
BASOPHILS # BLD AUTO: 0.04 X10(3)/MCL (ref 0–0.2)
BASOPHILS NFR BLD AUTO: 0.5 %
BILIRUB UR QL STRIP.AUTO: NEGATIVE MG/DL
BILIRUBIN DIRECT+TOT PNL SERPL-MCNC: 0.6 MG/DL
BUN SERPL-MCNC: 13.6 MG/DL (ref 7–18.7)
CALCIUM SERPL-MCNC: 9.9 MG/DL (ref 8.4–10.2)
CHLORIDE SERPL-SCNC: 98 MMOL/L (ref 98–107)
CO2 SERPL-SCNC: 25 MMOL/L (ref 22–29)
COLOR UR AUTO: ABNORMAL
CREAT SERPL-MCNC: 0.93 MG/DL (ref 0.55–1.02)
EOSINOPHIL # BLD AUTO: 0.16 X10(3)/MCL (ref 0–0.9)
EOSINOPHIL NFR BLD AUTO: 1.9 %
ERYTHROCYTE [DISTWIDTH] IN BLOOD BY AUTOMATED COUNT: 12.8 % (ref 11–14.5)
GFR SERPLBLD CREATININE-BSD FMLA CKD-EPI: >60 MLS/MIN/1.73/M2
GLOBULIN SER-MCNC: 3.9 GM/DL (ref 2.4–3.5)
GLUCOSE SERPL-MCNC: 360 MG/DL (ref 74–100)
GLUCOSE UR QL STRIP.AUTO: ABNORMAL MG/DL
HAV IGM SERPL QL IA: NONREACTIVE
HBV CORE IGM SERPL QL IA: NONREACTIVE
HBV SURFACE AG SERPL QL IA: NONREACTIVE
HCT VFR BLD AUTO: 44.9 % (ref 37–47)
HCV AB SERPL QL IA: NONREACTIVE
HGB BLD-MCNC: 15.6 GM/DL (ref 12–16)
HIV 1+2 AB+HIV1 P24 AG SERPL QL IA: NONREACTIVE
HYALINE CASTS #/AREA URNS LPF: ABNORMAL /LPF
IMM GRANULOCYTES # BLD AUTO: 0.03 X10(3)/MCL (ref 0–0.04)
IMM GRANULOCYTES NFR BLD AUTO: 0.4 %
KETONES UR QL STRIP.AUTO: ABNORMAL MG/DL
LEUKOCYTE ESTERASE UR QL STRIP.AUTO: NEGATIVE UNIT/L
LYMPHOCYTES # BLD AUTO: 2.37 X10(3)/MCL (ref 0.6–4.6)
LYMPHOCYTES NFR BLD AUTO: 28.7 %
MCH RBC QN AUTO: 29.3 PG
MCHC RBC AUTO-ENTMCNC: 34.7 MG/DL (ref 33–36)
MCV RBC AUTO: 84.4 FL (ref 80–94)
MONOCYTES # BLD AUTO: 0.51 X10(3)/MCL (ref 0.1–1.3)
MONOCYTES NFR BLD AUTO: 6.2 %
MUCOUS THREADS URNS QL MICRO: ABNORMAL /LPF
NEUTROPHILS # BLD AUTO: 5.16 X10(3)/MCL (ref 2.1–9.2)
NEUTROPHILS NFR BLD AUTO: 62.3 %
NITRITE UR QL STRIP.AUTO: NEGATIVE
NRBC BLD AUTO-RTO: 0 % (ref 0–1)
PH UR STRIP.AUTO: 6 [PH]
PLATELET # BLD AUTO: 185 X10(3)/MCL (ref 140–371)
PMV BLD AUTO: 12.5 FL (ref 9.4–12.4)
POTASSIUM SERPL-SCNC: 4.6 MMOL/L (ref 3.5–5.1)
PROT SERPL-MCNC: 8.3 GM/DL (ref 6.4–8.3)
PROT UR QL STRIP.AUTO: NEGATIVE MG/DL
RBC # BLD AUTO: 5.32 X10(6)/MCL (ref 4.2–5.4)
RBC #/AREA URNS AUTO: ABNORMAL /HPF
RBC UR QL AUTO: NEGATIVE UNIT/L
SODIUM SERPL-SCNC: 135 MMOL/L (ref 136–145)
SP GR UR STRIP.AUTO: 1.04
SQUAMOUS #/AREA URNS LPF: ABNORMAL /HPF
T PALLIDUM AB SER QL: NONREACTIVE
UROBILINOGEN UR STRIP-ACNC: NORMAL MG/DL
WBC # SPEC AUTO: 8.3 X10(3)/MCL (ref 4.5–11.5)
WBC #/AREA URNS AUTO: ABNORMAL /HPF

## 2022-12-27 PROCEDURE — 85025 COMPLETE CBC W/AUTO DIFF WBC: CPT | Performed by: NURSE PRACTITIONER

## 2022-12-27 PROCEDURE — 81001 URINALYSIS AUTO W/SCOPE: CPT | Performed by: NURSE PRACTITIONER

## 2022-12-27 PROCEDURE — 36415 COLL VENOUS BLD VENIPUNCTURE: CPT | Performed by: NURSE PRACTITIONER

## 2022-12-27 PROCEDURE — 99214 PR OFFICE/OUTPT VISIT, EST, LEVL IV, 30-39 MIN: ICD-10-PCS | Mod: S$PBB,,, | Performed by: NURSE PRACTITIONER

## 2022-12-27 PROCEDURE — 1159F MED LIST DOCD IN RCRD: CPT | Mod: CPTII,,, | Performed by: NURSE PRACTITIONER

## 2022-12-27 PROCEDURE — 87389 HIV-1 AG W/HIV-1&-2 AB AG IA: CPT | Performed by: NURSE PRACTITIONER

## 2022-12-27 PROCEDURE — 3008F PR BODY MASS INDEX (BMI) DOCUMENTED: ICD-10-PCS | Mod: CPTII,,, | Performed by: NURSE PRACTITIONER

## 2022-12-27 PROCEDURE — 3079F PR MOST RECENT DIASTOLIC BLOOD PRESSURE 80-89 MM HG: ICD-10-PCS | Mod: CPTII,,, | Performed by: NURSE PRACTITIONER

## 2022-12-27 PROCEDURE — 1160F PR REVIEW ALL MEDS BY PRESCRIBER/CLIN PHARMACIST DOCUMENTED: ICD-10-PCS | Mod: CPTII,,, | Performed by: NURSE PRACTITIONER

## 2022-12-27 PROCEDURE — 3075F PR MOST RECENT SYSTOLIC BLOOD PRESS GE 130-139MM HG: ICD-10-PCS | Mod: CPTII,,, | Performed by: NURSE PRACTITIONER

## 2022-12-27 PROCEDURE — 1159F PR MEDICATION LIST DOCUMENTED IN MEDICAL RECORD: ICD-10-PCS | Mod: CPTII,,, | Performed by: NURSE PRACTITIONER

## 2022-12-27 PROCEDURE — 86780 TREPONEMA PALLIDUM: CPT | Performed by: NURSE PRACTITIONER

## 2022-12-27 PROCEDURE — 3075F SYST BP GE 130 - 139MM HG: CPT | Mod: CPTII,,, | Performed by: NURSE PRACTITIONER

## 2022-12-27 PROCEDURE — 99214 OFFICE O/P EST MOD 30 MIN: CPT | Mod: PBBFAC,PN | Performed by: NURSE PRACTITIONER

## 2022-12-27 PROCEDURE — 80053 COMPREHEN METABOLIC PANEL: CPT | Performed by: NURSE PRACTITIONER

## 2022-12-27 PROCEDURE — 99214 OFFICE O/P EST MOD 30 MIN: CPT | Mod: S$PBB,,, | Performed by: NURSE PRACTITIONER

## 2022-12-27 PROCEDURE — 3008F BODY MASS INDEX DOCD: CPT | Mod: CPTII,,, | Performed by: NURSE PRACTITIONER

## 2022-12-27 PROCEDURE — 80074 ACUTE HEPATITIS PANEL: CPT | Performed by: NURSE PRACTITIONER

## 2022-12-27 PROCEDURE — 1160F RVW MEDS BY RX/DR IN RCRD: CPT | Mod: CPTII,,, | Performed by: NURSE PRACTITIONER

## 2022-12-27 PROCEDURE — 3079F DIAST BP 80-89 MM HG: CPT | Mod: CPTII,,, | Performed by: NURSE PRACTITIONER

## 2022-12-27 NOTE — ASSESSMENT & PLAN NOTE
Last crcL9x-5.0 in February.   -Microalbumin July 2022- 5.1  -Foot exam   Fundus- 11 months ago in [facility]

## 2022-12-27 NOTE — PROGRESS NOTES
Patient Name: Kinza Ford   : 1985  MRN: [id number]     SUBJECTIVE DATA:    CHIEF COMPLAINT:   Kinza Ford is a 37 y.o. female who presents to clinic today with Follow-up        HPI:  22:  Follow-up 4 months.  Since we last visited, she has been told she may have had a return of her thyroid cancer after an US revealed a spot in her neck.  She saw Dr. Bryan who did a CT without contrast of neck which did not show anything.  Randi told her the increase in TSI is due to thyroid tissue being somewhere which she did not understand.  She has an appt with ENT this week to discuss thyroid levels.  Last TSH was 21.  She does feel as though her diabetes is not well managed and has had pain that is described as burning in her left great toe at times, is bruising and also has increased thirst reported. Hair loss has stopped. Is very emotional today and scared.  She has upcoming appt with Endo soon .  Increasing TSH levels despite increase in Thyroid meds.  She is to have surgery on her trigger finger .  Due for some wellness labs her in primary care.  Has had hysterectomy last year.  Asthma controlled, no issues and no recent ED visits.    HLD: last FLP in july was WNL  DM: last hgbA1c 6.0 in February. Diet is good, not straying. Fasting are 170 in AM. During day it runs 125 at times it is in 160's.  Exercise is only thing that impacts fasting                 -Microalbumin .1                 -Foot exam 22  Anxiety/depression:  sees Dr. Tillman. No changes to meds recently.  Had some missed appointments.  Does not feel depressed, is emotional due to confusion over thyroid issue.    Trigger finger: surgery scheduled.     22:  FU, feeling better. Not as tired and not bruising as easily as was.  Does not have any cold sores.  Once she got a couple of weeks into higher dose of synthroid blood sugars started decreasing.  Appt with ENT 4/5 months.      22:  FU from  recent labs.  TSH was up to 14.  She tells me she was taking 150mcg and they decreased to 125mcg because she started having hair loss.  This was a few months ago.  Then she started having issues with the 125mcg dose.  I increased her medication to 150 and she has been taking since we last spoke on the phone.  Repeat TSH due in September. Will reach out to Endocrine at that time if still not Euthyroid.  She is not due to see Endo for months.  I did recommend FU with ENT since she had her thyroid removed.       7/25/22:  FU. States is not healing.  Is bruising easily.  PT told her to FU with her dr.  had weird episode of losing 18 pounds in 2 weeks, was super stressed at the time but gained it all back.  Has had some swollen lymph nodes in armpit and groin but they went away.  Here for labs    2/24/22: FU  HLD: last FLP in july was WNL  DM: last hgbA1c 6.4 in November. Diet is better, not straying. Fasting are 170 in AM. During day it runs 110-120. Exercise is only thing that impacts fasting and has been unable to do that due to recent thyroid surgery.  -Microalbumin july 5.1  -Foot exam 7/21  Mild intermittent asthma: no issues with asthma in a long time. Does have allergies  Menorrhagia: resolved. hysterectomy performed last year  Takes Spirinolactone for hair loss and it is helping, prescribed by Dr. Klein  Anxiety/Depression: Wellbutrin and buspirone not effective.  Event Name  Event Result  Date/Time   GAD7 Score 14 02/24/22 12:45:00  Initial Depression Screen Score 2 02/24/22 12:43:00  Detailed Depression Screen Score 8 02/24/22 12:43:00  Total Depression Screen Score 10 02/24/22 12:43:00    Right knee pain: 2 months hx pain reported. no injury. crossfit x 6 months in past. hx weight lifting. XR in [place] told negative. still has pain/stiffness mostly throughout day. nothing makes it better but movement.   Right ring finger trigger finger: x 1 year began after scraping paint. has had injected in past  last February which helped but now having issues again. pain to base of finger.    10/19/21: Telemedicine FU 2 weeks.  This is a telehealth visit note. Patient was treated using telehealth, real time audio, video, or both according to [facility] protocols. Feyl PAUL FNP-C, conducted the visit from location identified below. The patient participated in the visit at a non-OU location selected by the patient (or patient's representative), identified below. I am licensed in the [facility]. The patient (or patient's representative) stated that they understood and accepted the privacy and security risks to their information at their location and has consented to telephone visit.  Patient was located at patient's home.  Fely PAUL FNP-C, was located at [facility] [address].  We increased Wellbutrin to 300mg last visit and ANN was 7, we inc Buspirone to 15mg po TID. Today ANN is 5. PHQ-9 is 0.  c/o yeast infection 2 days ago started, recent abx for UTI, recurrent yeast infections. after surgery her sugars have been higher than usual. HA tried Monistat OTC 2 rounds of 7 day tx. no relief.  Event Name  Event Result  Date/Time   GAD7 Score 5 10/19/21 13:00:00  Initial Depression Screen Score 0 10/19/21 13:00:00      10/5/2021: Telemedicine follow-up-3 months.  This is a telehealth visit note. Patient was treated using telehealth, real time audio, video, or both according to [facility] protocols. Fely PAUL FNP-C, conducted the visit from location identified below. The patient participated in the visit at a non-OU location selected by the patient (or patient's representative), identified below. I am licensed in the [facility]. The patient (or patient's representative) stated that they understood and accepted the privacy and security risks to their information at their location and has consented to telephone visit.  Patient was located at patient's home.  Fely PAUL  "MARGAUXJUAN, was located at [facility], [place], [place].  ER visit 9/22/21-odynophagia s/p thyroidectomy. Is feeling tightness in throat makes her feel like she is choking  August 23, 21 she had a total thyroidectomy for papillary thyroid cancer.  HLD: last FLP in july was WNL  DM: last hgbA1c 6.3 in July. Diet is better, not straying. Fasting are 170 in AM. During day it runs 110-120. Exercise is only thing that impacts fasting and has been unable to do that due to recent thyroid surgery.  microalbumin july 5.1  foot exam 7/21  Mild intermittent asthma: no issues with asthma in a long time. Does have allergies  Menorrhagia: states her periods are really heavy. states puts tampon in and it falls out. This started after last child in 2018. cramping and huge clots reported. Upcoming appt with [employer] 10/25/21.  Takes Spirinolactone for hair loss and it is helping, prescribed by Dr. Klein  c/o joint pain to entire body, used to be just hands/wrists, now it is in shoulders. Sister has lupus. She has never been worked up for it.            ALLERGIES:   Review of patient's allergies indicates:   Allergen Reactions    Adhesive      Other reaction(s): Burn    Sulfa (sulfonamide antibiotics)      Other reaction(s): Unknown    Sulfamethoxazole      Other reaction(s): Rash         ROS:  Review of Systems   Constitutional: Negative.    HENT: Negative.     Eyes: Negative.    Respiratory: Negative.     Cardiovascular: Negative.    Gastrointestinal: Negative.    Genitourinary: Negative.    Musculoskeletal: Negative.    Skin: Negative.    Neurological: Negative.    Endo/Heme/Allergies: Negative.    Psychiatric/Behavioral: Negative.     All other systems reviewed and are negative.      OBJECTIVE DATA:  Vital signs  Vitals:    12/27/22 1205   BP: 136/83   Pulse: 90   Resp: 18   Temp: 97.7 °F (36.5 °C)   SpO2: 99%   Weight: 92.2 kg (203 lb 3.2 oz)   Height: 5' 4" (1.626 m)      Body mass index is 34.88 " kg/m².    PHYSICAL EXAM:   Physical Exam  Vitals and nursing note reviewed.   HENT:      Head: Normocephalic and atraumatic.   Cardiovascular:      Rate and Rhythm: Normal rate and regular rhythm.      Pulses: Normal pulses.      Heart sounds: Normal heart sounds.   Pulmonary:      Breath sounds: Normal breath sounds.   Musculoskeletal:         General: Normal range of motion.      Cervical back: Normal range of motion.   Skin:     General: Skin is warm and dry.   Neurological:      General: No focal deficit present.      Mental Status: She is alert and oriented to person, place, and time.   Psychiatric:         Mood and Affect: Mood normal.        ASSESSMENT/PLAN:  1. Adjustment disorder with mixed anxiety and depressed mood  Assessment & Plan:  Followed by Dr. Tillman, continue all appt and meds as directed  ER for SI/HI      2. Mild intermittent asthma, unspecified whether complicated  Assessment & Plan:  Chronic, stable.      3. Hyperlipidemia, unspecified hyperlipidemia type  Overview:  No results found for: CHOL  No results found for: HDL  No results found for: LDLCALC  No results found for: TRIG  No results found for: CHOLHDL;    Assessment & Plan:  FLP needed      4. Type 2 diabetes mellitus without complication, without long-term current use of insulin  Assessment & Plan:  Last xzzG5t-1.0 in February.   -Microalbumin July 2022- 5.1  -Foot exam   Fundus- [facility] in [facility]       5. Vitamin D deficiency  Assessment & Plan:  Vitamin D level today      6. Steatosis of liver  Assessment & Plan:  Diet and exercise recommended        7. Postoperative hypothyroidism  Overview:  Lab Results   Component Value Date    TSH 21.5242 (H) 11/25/2022         Assessment & Plan:  Pending ENT referral to Endo, upcoming appt. Due for labs for that appt.      8. Encounter for wellness examination  -     CBC Auto Differential  -     Comprehensive Metabolic Panel  -     Urinalysis  -     Hepatitis Panel, Acute  -     HIV  1/2 Ag/Ab (4th Gen)  -     SYPHILIS ANTIBODY (WITH REFLEX RPR)           RESULTS:  Recent Results (from the past 1008 hour(s))   COVID/RSV/FLU A&B PCR    Collection Time: 11/16/22  1:53 PM   Result Value Ref Range    Influenza A PCR Not Detected Not Detected    Influenza B PCR Not Detected Not Detected    Respiratory Syncytial Virus PCR Not Detected Not Detected    SARS-CoV-2 PCR Not Detected Not Detected   T4, Free    Collection Time: 11/25/22 11:54 AM   Result Value Ref Range    Thyroxine Free 0.84 0.70 - 1.48 ng/dL   TSH    Collection Time: 11/25/22 11:54 AM   Result Value Ref Range    Thyroid Stimulating Hormone 21.5242 (H) 0.3500 - 4.9400 uIU/mL   Thyroglobulin    Collection Time: 11/25/22 11:54 AM   Result Value Ref Range    Thyroglobulin Antibody, S <1.8 <1.8 IU/mL    Thyroglobulin, Tumor Marker, S 4.8 (H) ng/mL    Thyroglobulin Interpretation SEE COMMENTS          Follow Up:  Follow up in about 3 months (around 3/27/2023) for Wellness, Review of labs.           This note was created with the assistance of a voice recognition software or phone dictation. There may be transcription errors as a result of using this technology however minimal. Effort has been made to assure accuracy of transcription but any obvious errors or omissions should be clarified with the author of the document

## 2022-12-28 ENCOUNTER — LAB VISIT (OUTPATIENT)
Dept: LAB | Facility: HOSPITAL | Age: 37
End: 2022-12-28
Attending: NURSE PRACTITIONER
Payer: MEDICAID

## 2022-12-28 DIAGNOSIS — E89.0 POSTOPERATIVE HYPOTHYROIDISM: ICD-10-CM

## 2022-12-28 DIAGNOSIS — E11.9 TYPE 2 DIABETES MELLITUS WITHOUT COMPLICATION, WITHOUT LONG-TERM CURRENT USE OF INSULIN: Primary | ICD-10-CM

## 2022-12-28 DIAGNOSIS — E07.89 THYROID MASS OF UNCLEAR ETIOLOGY: ICD-10-CM

## 2022-12-28 LAB
T4 FREE SERPL-MCNC: 1.39 NG/DL (ref 0.7–1.48)
TSH SERPL-ACNC: 1.81 UIU/ML (ref 0.35–4.94)

## 2022-12-28 PROCEDURE — 36415 COLL VENOUS BLD VENIPUNCTURE: CPT

## 2022-12-28 PROCEDURE — 84443 ASSAY THYROID STIM HORMONE: CPT

## 2022-12-28 PROCEDURE — 84439 ASSAY OF FREE THYROXINE: CPT

## 2022-12-28 RX ORDER — LINAGLIPTIN 5 MG/1
5 TABLET, FILM COATED ORAL DAILY
Qty: 90 TABLET | Refills: 3 | Status: SHIPPED | OUTPATIENT
Start: 2022-12-28 | End: 2023-01-19 | Stop reason: ALTCHOICE

## 2022-12-28 NOTE — PROGRESS NOTES
I have sent medications and/or lab orders in for this patient.  I have sent a message through the portal for her to be notified.  If she does not read the message, we will need to contact her.     No orders of the defined types were placed in this encounter.      Medications Ordered This Encounter   Medications    linaGLIPtin (TRADJENTA) 5 mg Tab tablet     Sig: Take 1 tablet (5 mg total) by mouth once daily.     Dispense:  90 tablet     Refill:  3

## 2022-12-29 ENCOUNTER — ANESTHESIA EVENT (OUTPATIENT)
Dept: SURGERY | Facility: HOSPITAL | Age: 37
End: 2022-12-29
Payer: MEDICAID

## 2022-12-29 ENCOUNTER — OFFICE VISIT (OUTPATIENT)
Dept: OTOLARYNGOLOGY | Facility: CLINIC | Age: 37
End: 2022-12-29
Payer: MEDICAID

## 2022-12-29 VITALS — HEART RATE: 73 BPM | DIASTOLIC BLOOD PRESSURE: 76 MMHG | SYSTOLIC BLOOD PRESSURE: 116 MMHG | TEMPERATURE: 98 F

## 2022-12-29 DIAGNOSIS — E89.0 POSTOPERATIVE HYPOTHYROIDISM: ICD-10-CM

## 2022-12-29 DIAGNOSIS — J32.9 CHRONIC SINUSITIS, UNSPECIFIED LOCATION: ICD-10-CM

## 2022-12-29 DIAGNOSIS — C73 PAPILLARY CARCINOMA OF THYROID: Primary | ICD-10-CM

## 2022-12-29 DIAGNOSIS — E07.89 THYROID MASS OF UNCLEAR ETIOLOGY: ICD-10-CM

## 2022-12-29 LAB — PATH REV: NORMAL

## 2022-12-29 PROCEDURE — 3078F DIAST BP <80 MM HG: CPT | Mod: CPTII,,, | Performed by: NURSE PRACTITIONER

## 2022-12-29 PROCEDURE — 99213 OFFICE O/P EST LOW 20 MIN: CPT | Mod: PBBFAC | Performed by: NURSE PRACTITIONER

## 2022-12-29 PROCEDURE — 3074F SYST BP LT 130 MM HG: CPT | Mod: CPTII,,, | Performed by: NURSE PRACTITIONER

## 2022-12-29 PROCEDURE — 99213 PR OFFICE/OUTPT VISIT, EST, LEVL III, 20-29 MIN: ICD-10-PCS | Mod: S$PBB,,, | Performed by: NURSE PRACTITIONER

## 2022-12-29 PROCEDURE — 3078F PR MOST RECENT DIASTOLIC BLOOD PRESSURE < 80 MM HG: ICD-10-PCS | Mod: CPTII,,, | Performed by: NURSE PRACTITIONER

## 2022-12-29 PROCEDURE — 1159F MED LIST DOCD IN RCRD: CPT | Mod: CPTII,,, | Performed by: NURSE PRACTITIONER

## 2022-12-29 PROCEDURE — 1159F PR MEDICATION LIST DOCUMENTED IN MEDICAL RECORD: ICD-10-PCS | Mod: CPTII,,, | Performed by: NURSE PRACTITIONER

## 2022-12-29 PROCEDURE — 99213 OFFICE O/P EST LOW 20 MIN: CPT | Mod: S$PBB,,, | Performed by: NURSE PRACTITIONER

## 2022-12-29 PROCEDURE — 3074F PR MOST RECENT SYSTOLIC BLOOD PRESSURE < 130 MM HG: ICD-10-PCS | Mod: CPTII,,, | Performed by: NURSE PRACTITIONER

## 2022-12-29 NOTE — PROGRESS NOTES
[facility]  Otolaryngology Clinic Note    Kinza Walker Best  YOB: 1985    Chief Complaint: f/u    HPI: 7/23/21: 35yoF referred for thyroid nodules. Denies dysphagia, dysphonia, or SOB. Notes that she can see a lump when she looks at her neck. Paternal grandmother & sisters with thyroidectomies but she does not believe any were for cancer. Denies radiation exposure. Never smoker. She did have an FNA roughly 10 years ago which was benign but does not remember which side.     8/11/2021: Here today for follow-up after her IR biopsy. Biopsy of right thyroid nodule showing papillary thyroid carcinoma. She states that she been doing okay. She may be noticed a little bit more roughness to her voice as of late. No breathiness. No dysphagia or odynophagia. No breathing troubles. She has no family history of thyroid cancer, but has had multiple members of her family's with Hashimoto's and other thyroid issues requiring medicines. 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 She has a past medical history significant for diabetes as well as takes medication for her mood. No blood thinners. No issues with neck extension or prior neck surgery. She has had gyn surgeries as well as sinus surgery in the past.     8/23/21: Total thyroidectomy pth 125, Ca 8.9    8/26/21: Doing ok since surgery. Still with R shoulder pain but improves with gabapentin. Still has sore throat. Has not tried singing at this time.     September 1, 2021: 35-year-old female presents today for follow-up after undergoing total thyroidectomy on August 23, 2021, for treatment of her papillary thyroid carcinoma. Patient presents today because of complaints of a sensation of a lump in her throat. She does find this to be bothersome. She is also noticed that she has some swelling involving the anterior portion of her neck.  "She is not having any problems with any difficulty swallowing at this time and does not have any hoarseness.  Final pathology report had returned showing the presence of a unifocal papillary thyroid carcinoma in the left lobe of the thyroid gland measuring 1.0 cm in size. There was no angioinvasion, lymphatic invasion, perineural invasion, or extrathyroidal extension. Tumor was completely excised. Also noted were some areas of nodular hyperplasia. Results were discussed with the patient.    3-23-22: Patient is follow-up for papillary thyroid carcinoma, T1NX0, 1 cm lesion intracapsular no evidence of spread either angioma or lymphatic and no perineural invasion. He needs to have less apparent fatigue.     11/21/22: Referred for sinus c/o. Lost to f/u for PTC. Needs thyroid u/s & labs. Has not had TFT since PCP adjusted synthroid in August. States she has intermittent tiredness, dry skin, hair breakage that will improve for a short time after synthroid adjustments. Had appt with Dr. Goodman in July that was cancelled because she had just started school. Endo appt r/s 9/2023. She c/o frequent sinus infections throughout the year which seem to be worsening over the past few years. Reports thes are always worst in the spring and fall. States that her eyes swell and cheeks get sore. Endorses nasal congestion, PND, pain/pressure around eyes/cheeks/nose, headaches. Denies hyposmia. Has recently completed multiple rounds of abx and steroids for a sinus infection. Reports that she finally is starting to feel that she is having some improvement. States her main c/o is a sensation of "ball of infection" between nose and palate. She has been using flonase and zyrtec daily for years, and uses saline spray prn. 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 She took immunotherapy in her teens and early 20's. She does remember it being helpful. She had a sinus surgery with Dr. Bryan around 10 years ago and is " "thinking she might need to have her sinuses "cleaned out" again. Denies dysphagia or type B symptoms. She has dysphonia with sinus infections and occasionally it will sound "gruff" since surgery but nothing consistent. She is a singer.      22: F/u after labs and u/s. Area concerning for scarring/granulation vs residual thyroid tissue on u/s. Additionally, her thyroglobulin is elevated & TSH trending up despite increase in synthroid by PCP in August. Endorses appropriate synthroid administration.    2022: F/u after synthroid increase. TSH now WNL. States she is finally feeling much better overall.     ROS:   10-point review of systems negative except per HPI      Review of patient's allergies indicates:   Allergen Reactions    Adhesive      Other reaction(s): Burn    Sulfa (sulfonamide antibiotics)      Other reaction(s): Unknown    Sulfamethoxazole      Other reaction(s): Rash       Past Medical History:   Diagnosis Date    Acquired hypothyroidism     DM2 (diabetes mellitus, type 2)     Hx of papillary thyroid carcinoma     IBS (irritable bowel syndrome)     Migraines     Mixed hyperlipidemia        Past Surgical History:   Procedure Laterality Date     SECTION      COLONOSCOPY      ESOPHAGOGASTRODUODENOSCOPY      THYROIDECTOMY      TONSILLECTOMY      WISDOM TOOTH EXTRACTION         Social History     Socioeconomic History    Marital status:    Tobacco Use    Smoking status: Never    Smokeless tobacco: Never   Substance and Sexual Activity    Alcohol use: Not Currently    Drug use: Never    Sexual activity: Yes       Family History   Problem Relation Age of Onset    No Known Problems Mother     Crohn's disease Father     Diabetes Mellitus Father        Outpatient Encounter Medications as of 2022   Medication Sig Dispense Refill    albuterol (PROVENTIL/VENTOLIN HFA) 90 mcg/actuation inhaler 1 puff every 6 (six) hours as needed. As needed for wheezing.      azelastine (ASTELIN) 137 " mcg (0.1 %) nasal spray 1 spray (137 mcg total) by Nasal route 2 (two) times daily. 30 mL 5    buPROPion (WELLBUTRIN XL) 300 MG 24 hr tablet Take 1 tablet (300 mg total) by mouth once daily. 30 tablet 5    busPIRone (BUSPAR) 15 MG tablet Take 1 tablet (15 mg total) by mouth 2 (two) times a day. 60 tablet 5    cetirizine (ZYRTEC) 10 MG tablet one tablet      cholecalciferol, vitamin D3, (VITAMIN D3) 50 mcg (2,000 unit) Cap capsule 1 capsule      dapagliflozin (FARXIGA) 10 mg tablet Take 1 tablet (10 mg total) by mouth once daily at 6am. 30 tablet 3    FLUoxetine 20 MG capsule Take 1 capsule (20 mg total) by mouth once daily. 30 capsule 5    hydrOXYzine pamoate (VISTARIL) 25 MG Cap Take 1 capsule (25 mg total) by mouth daily as needed (anxiety or panic attack). 30 capsule 5    levothyroxine (SYNTHROID, LEVOTHROID) 175 MCG tablet Take 1 tablet (175 mcg total) by mouth before breakfast. 30 tablet 11    linaGLIPtin (TRADJENTA) 5 mg Tab tablet Take 1 tablet (5 mg total) by mouth once daily. 90 tablet 3    metFORMIN (GLUCOPHAGE-XR) 500 MG ER 24hr tablet Take 1,000 mg by mouth 2 (two) times daily.      ONETOUCH DELICA PLUS LANCET 33 gauge Misc Apply topically 2 (two) times daily.      ONETOUCH VERIO TEST STRIPS Strp 2 (two) times daily.      spironolactone (ALDACTONE) 50 MG tablet 1 tablet       No facility-administered encounter medications on file as of 12/29/2022.       Physical Exam:  Vitals:    12/29/22 0909   BP: 116/76   BP Location: Right arm   Patient Position: Sitting   BP Method: Large (Automatic)   Pulse: 73   Temp: 97.9 °F (36.6 °C)   TempSrc: Oral       General: NAD, voice normal  Neuro: AAO, CN II - XII grossly intact  Head/ Face: NCAT, symmetric, sensations intact bilaterally  Eyes: EOMI, PERRL  Ears: externally normal with grossly normal hearing  AD: EAC patent, TM intact, no middle ear effusion, no retractions  AS: EAC patent, TM intact, no middle ear effusion, no retractions  Nose: bilateral nares patent,  midline septum, no rhinorrhea, no external deformity, no turbinate hypertrophy  OC/OP: MMM, no intraoral lesions, FOM/BOT soft, no trismus, dentition is moderate, no uvular deviation, bilaterally symmetric soft palate elevation, palatoglossus and palatopharyngeal fold wnl; tonsils are symmetric and 1+  Indirect laryngoscopy: deferred due to patient intolerance  Neck: soft, supple, no LAD, normal ROM, no thyromegaly  Respiratory: nonlabored, no wheezing, bilateral chest rise  Cardiovascular: RRR  Gastrointestinal: S NT ND  Skin: warm, no lesions  Musculoskeletal: 5/5 strength  Psych: Appropriate affect/mood     Pertinent Data:  ? LABS:      Imaging:   I personally reviewed the following images:        Assessment/Plan:  37 y.o. female PMH [id number] PTC involving the left lobe of the thyroid gland status post total thyroidectomy on 8/23/21. Postsurgical hypothyroidism with difficulty managing. TSH previously increased to 21.5, Thyroglobulin 4.8, U/s with area of tissue concerning for residual thyroid vs scarring/granulation. TSH has normalized (1.813)- Reviewed low risk and need for additional labs with pt. Reassurance provided that recurrence is unlikely. D/w Dr. Stuart who also reassured pt. Hx of immunotherapy and ESS. Has appt with Dr. Goodman next month with labs prior.   - Continue synthroid at 175mcg  - Continue NSI BID & prn  - Continue flonase & astelin BID  - Continue zyrtec  - RTC 2mo Res      Michelle Varghese NP

## 2022-12-29 NOTE — ANESTHESIA PREPROCEDURE EVALUATION
"                                                                                                             12/29/2022  Kinza Ford is a 37 y.o., female.    COVID STATUS: 10/2020 COVID +; 1/23/22 COVID +; J&J 5/11/22  BETA-BLOCKER: NONE    PAT NURSE PHONE INTERVIEW 12/30/22    PROBLEM LIST:  -  RIGHT TRIGGER RING FINGER  -  HYSTERECTOMY (12/21/21)  -  OBESITY CLASS I  -  HLD   -  T2DM (ORALS x 3) - 2/24/22 A1c 6%/  (LIKELY INACCURATE GIVEN 12/27/22 NNMJ=760 ; AVG. FASTING GLUCOSE "155"; DENIES ANY HYPOGLYCEMIA  -  ASTHMA - QUICK RELIEF USE 1x/MONTH  -  Hx SVT DURING HOSP. 12/2018 2/2 SEPSIS, NO CARDs F/U  -  SURGICAL HYPOTHYROIDISM - 12/28/22 TFT's WNL      - S/P 8/23/21 TOTAL THYROIDECTOMY 2/2 PTC (Dx 8/5/21)  -  ANXIETY/DEPRESSION  -  MIGRAINES  -  GERD - OTC PREVACID PRN x 14 DAYS  -  ETOH 1-2x/WEEK  -  Hx PONV    AM Rx DOS: ALBUTEROL INHAL PRN, WELLBUTRIN, BUSPAR, PROZAC, LEVOTHYROXINE, OTC PREVACID     ORDERS -   SURGEON: 8/20/21 EKG; 2/24/22 A1c; 12/27/22 CBC, CMP; 12/28/22 TFT's;  ANESTHESIA: DM PROTOCOL, SCOP PATCH    Pre-op Assessment    I have reviewed the Patient Summary Reports.     I have reviewed the Nursing Notes. I have reviewed the NPO Status.   I have reviewed the Medications.     Review of Systems  Anesthesia Hx:  No problems with previous Anesthesia  History of prior surgery of interest to airway management or planning: Denies Family Hx of Anesthesia complications.   Denies Personal Hx of Anesthesia complications.   Hematology/Oncology:  Hematology Normal   Oncology Normal     EENT/Dental:EENT/Dental Normal   Cardiovascular:  Cardiovascular Normal     Pulmonary:  Pulmonary Normal    Renal/:  Renal/ Normal     Hepatic/GI:  Hepatic/GI Normal    Musculoskeletal:  Musculoskeletal Normal    Neurological:  Neurology Normal    Endocrine:  Endocrine Normal    Dermatological:  Skin Normal    Psych:  Psychiatric Normal              Anesthesia Plan  Type of Anesthesia, risks & " benefits discussed:    Anesthesia Type: MAC  Intra-op Monitoring Plan: Standard ASA Monitors  Induction:  IV  Informed Consent: Informed consent signed with the Patient and all parties understand the risks and agree with anesthesia plan.  All questions answered. Patient consented to blood products? No  ASA Score: 3  Day of Surgery Review of History & Physical: H&P Update referred to the surgeon/provider.    Ready For Surgery From Anesthesia Perspective.     .    Lab Results   Component Value Date    WBC 8.3 12/27/2022    HGB 15.6 12/27/2022    HCT 44.9 12/27/2022    MCV 84.4 12/27/2022     12/27/2022     CMP  Sodium Level   Date Value Ref Range Status   12/27/2022 135 (L) 136 - 145 mmol/L Final     Potassium Level   Date Value Ref Range Status   12/27/2022 4.6 3.5 - 5.1 mmol/L Final     Carbon Dioxide   Date Value Ref Range Status   12/27/2022 25 22 - 29 mmol/L Final     Blood Urea Nitrogen   Date Value Ref Range Status   12/27/2022 13.6 7.0 - 18.7 mg/dL Final     Creatinine   Date Value Ref Range Status   12/27/2022 0.93 0.55 - 1.02 mg/dL Final     Calcium Level Total   Date Value Ref Range Status   12/27/2022 9.9 8.4 - 10.2 mg/dL Final     Albumin Level   Date Value Ref Range Status   12/27/2022 4.4 3.5 - 5.0 g/dL Final     Bilirubin Total   Date Value Ref Range Status   12/27/2022 0.6 <=1.5 mg/dL Final     Alkaline Phosphatase   Date Value Ref Range Status   12/27/2022 83 40 - 150 unit/L Final     Aspartate Aminotransferase   Date Value Ref Range Status   12/27/2022 34 5 - 34 unit/L Final     Alanine Aminotransferase   Date Value Ref Range Status   12/27/2022 86 (H) 0 - 55 unit/L Final     eGFR   Date Value Ref Range Status   12/27/2022 >60 mls/min/1.73/m2 Final     Lab Results   Component Value Date    TSH 1.813 12/28/2022

## 2022-12-30 ENCOUNTER — TELEPHONE (OUTPATIENT)
Dept: FAMILY MEDICINE | Facility: CLINIC | Age: 37
End: 2022-12-30
Payer: MEDICAID

## 2023-01-05 ENCOUNTER — HOSPITAL ENCOUNTER (OUTPATIENT)
Facility: HOSPITAL | Age: 38
Discharge: HOME | End: 2023-01-05
Attending: SURGERY | Admitting: SURGERY
Payer: MEDICAID

## 2023-01-05 ENCOUNTER — ANESTHESIA (OUTPATIENT)
Dept: SURGERY | Facility: HOSPITAL | Age: 38
End: 2023-01-05
Payer: MEDICAID

## 2023-01-05 DIAGNOSIS — M65.30 TRIGGER FINGER: ICD-10-CM

## 2023-01-05 DIAGNOSIS — M65.341 TRIGGER RING FINGER OF RIGHT HAND: ICD-10-CM

## 2023-01-05 LAB — POCT GLUCOSE: 231 MG/DL (ref 70–110)

## 2023-01-05 PROCEDURE — 37000008 HC ANESTHESIA 1ST 15 MINUTES: Performed by: SURGERY

## 2023-01-05 PROCEDURE — 36000707: Performed by: SURGERY

## 2023-01-05 PROCEDURE — 63600175 PHARM REV CODE 636 W HCPCS: Performed by: NURSE ANESTHETIST, CERTIFIED REGISTERED

## 2023-01-05 PROCEDURE — 25000003 PHARM REV CODE 250: Performed by: NURSE ANESTHETIST, CERTIFIED REGISTERED

## 2023-01-05 PROCEDURE — 63600175 PHARM REV CODE 636 W HCPCS: Performed by: NURSE PRACTITIONER

## 2023-01-05 PROCEDURE — 26055 PR INCISE FINGER TENDON SHEATH: ICD-10-PCS | Mod: F8,,, | Performed by: SURGERY

## 2023-01-05 PROCEDURE — 25000003 PHARM REV CODE 250: Performed by: NURSE PRACTITIONER

## 2023-01-05 PROCEDURE — 25000003 PHARM REV CODE 250: Performed by: SURGERY

## 2023-01-05 PROCEDURE — 71000016 HC POSTOP RECOV ADDL HR: Performed by: SURGERY

## 2023-01-05 PROCEDURE — 71000015 HC POSTOP RECOV 1ST HR: Performed by: SURGERY

## 2023-01-05 PROCEDURE — 01810 ANES PX NRV MUSC F/ARM WRST: CPT | Performed by: SURGERY

## 2023-01-05 PROCEDURE — 36000706: Performed by: SURGERY

## 2023-01-05 PROCEDURE — 37000009 HC ANESTHESIA EA ADD 15 MINS: Performed by: SURGERY

## 2023-01-05 PROCEDURE — 25000003 PHARM REV CODE 250: Performed by: STUDENT IN AN ORGANIZED HEALTH CARE EDUCATION/TRAINING PROGRAM

## 2023-01-05 PROCEDURE — 26055 INCISE FINGER TENDON SHEATH: CPT | Mod: F8,,, | Performed by: SURGERY

## 2023-01-05 PROCEDURE — 25000003 PHARM REV CODE 250

## 2023-01-05 RX ORDER — BUPIVACAINE HYDROCHLORIDE AND EPINEPHRINE 5; 5 MG/ML; UG/ML
INJECTION, SOLUTION EPIDURAL; INTRACAUDAL; PERINEURAL
Status: DISCONTINUED
Start: 2023-01-05 | End: 2023-01-05 | Stop reason: HOSPADM

## 2023-01-05 RX ORDER — MUPIROCIN 20 MG/G
OINTMENT TOPICAL
Status: DISCONTINUED | OUTPATIENT
Start: 2023-01-05 | End: 2023-01-05 | Stop reason: HOSPADM

## 2023-01-05 RX ORDER — ONDANSETRON 4 MG/1
4 TABLET, FILM COATED ORAL 2 TIMES DAILY
Qty: 28 TABLET | Refills: 0 | Status: SHIPPED | OUTPATIENT
Start: 2023-01-05 | End: 2023-01-19

## 2023-01-05 RX ORDER — LIDOCAINE HCL/PF 100 MG/5ML
SYRINGE (ML) INTRAVENOUS
Status: DISCONTINUED | OUTPATIENT
Start: 2023-01-05 | End: 2023-01-05

## 2023-01-05 RX ORDER — BUPIVACAINE HYDROCHLORIDE AND EPINEPHRINE 5; 5 MG/ML; UG/ML
INJECTION, SOLUTION EPIDURAL; INTRACAUDAL; PERINEURAL
Status: DISCONTINUED | OUTPATIENT
Start: 2023-01-05 | End: 2023-01-05 | Stop reason: HOSPADM

## 2023-01-05 RX ORDER — DEXAMETHASONE SODIUM PHOSPHATE 4 MG/ML
INJECTION, SOLUTION INTRA-ARTICULAR; INTRALESIONAL; INTRAMUSCULAR; INTRAVENOUS; SOFT TISSUE
Status: DISCONTINUED | OUTPATIENT
Start: 2023-01-05 | End: 2023-01-05

## 2023-01-05 RX ORDER — OXYCODONE AND ACETAMINOPHEN 5; 325 MG/1; MG/1
1 TABLET ORAL EVERY 4 HOURS PRN
Qty: 28 TABLET | Refills: 0 | Status: SHIPPED | OUTPATIENT
Start: 2023-01-05 | End: 2023-01-12

## 2023-01-05 RX ORDER — FENTANYL CITRATE 50 UG/ML
INJECTION, SOLUTION INTRAMUSCULAR; INTRAVENOUS
Status: DISCONTINUED | OUTPATIENT
Start: 2023-01-05 | End: 2023-01-05

## 2023-01-05 RX ORDER — INSULIN ASPART 100 [IU]/ML
4-12 INJECTION, SOLUTION INTRAVENOUS; SUBCUTANEOUS
Status: DISCONTINUED | OUTPATIENT
Start: 2023-01-05 | End: 2023-06-28

## 2023-01-05 RX ORDER — SODIUM CHLORIDE, SODIUM LACTATE, POTASSIUM CHLORIDE, CALCIUM CHLORIDE 600; 310; 30; 20 MG/100ML; MG/100ML; MG/100ML; MG/100ML
INJECTION, SOLUTION INTRAVENOUS CONTINUOUS PRN
Status: DISCONTINUED | OUTPATIENT
Start: 2023-01-05 | End: 2023-01-05

## 2023-01-05 RX ORDER — SCOLOPAMINE TRANSDERMAL SYSTEM 1 MG/1
1 PATCH, EXTENDED RELEASE TRANSDERMAL
Status: DISPENSED | OUTPATIENT
Start: 2023-01-05

## 2023-01-05 RX ORDER — SODIUM CHLORIDE 9 MG/ML
INJECTION, SOLUTION INTRAVENOUS CONTINUOUS
Status: DISCONTINUED | OUTPATIENT
Start: 2023-01-05 | End: 2023-01-05 | Stop reason: HOSPADM

## 2023-01-05 RX ORDER — PROPOFOL 10 MG/ML
INJECTION, EMULSION INTRAVENOUS
Status: DISCONTINUED | OUTPATIENT
Start: 2023-01-05 | End: 2023-01-05

## 2023-01-05 RX ORDER — CEFAZOLIN SODIUM 1 G/3ML
INJECTION, POWDER, FOR SOLUTION INTRAMUSCULAR; INTRAVENOUS
Status: DISCONTINUED | OUTPATIENT
Start: 2023-01-05 | End: 2023-01-05

## 2023-01-05 RX ORDER — ONDANSETRON 2 MG/ML
INJECTION INTRAMUSCULAR; INTRAVENOUS
Status: DISCONTINUED | OUTPATIENT
Start: 2023-01-05 | End: 2023-01-05

## 2023-01-05 RX ORDER — IBUPROFEN 800 MG/1
800 TABLET ORAL 3 TIMES DAILY
Qty: 42 TABLET | Refills: 0 | Status: SHIPPED | OUTPATIENT
Start: 2023-01-05 | End: 2023-01-19

## 2023-01-05 RX ORDER — KETAMINE HYDROCHLORIDE 100 MG/ML
INJECTION, SOLUTION INTRAMUSCULAR; INTRAVENOUS
Status: DISCONTINUED | OUTPATIENT
Start: 2023-01-05 | End: 2023-01-05

## 2023-01-05 RX ORDER — INSULIN ASPART 100 [IU]/ML
2-9 INJECTION, SOLUTION INTRAVENOUS; SUBCUTANEOUS
Status: DISCONTINUED | OUTPATIENT
Start: 2023-01-05 | End: 2023-06-28

## 2023-01-05 RX ORDER — LIDOCAINE HYDROCHLORIDE 10 MG/ML
1 INJECTION, SOLUTION EPIDURAL; INFILTRATION; INTRACAUDAL; PERINEURAL ONCE
Status: DISCONTINUED | OUTPATIENT
Start: 2023-01-05 | End: 2023-06-28

## 2023-01-05 RX ADMIN — CEFAZOLIN 2 G: 330 INJECTION, POWDER, FOR SOLUTION INTRAMUSCULAR; INTRAVENOUS at 11:01

## 2023-01-05 RX ADMIN — DEXAMETHASONE SODIUM PHOSPHATE 4 MG: 4 INJECTION, SOLUTION INTRA-ARTICULAR; INTRALESIONAL; INTRAMUSCULAR; INTRAVENOUS; SOFT TISSUE at 11:01

## 2023-01-05 RX ADMIN — SODIUM CHLORIDE, POTASSIUM CHLORIDE, SODIUM LACTATE AND CALCIUM CHLORIDE: 600; 310; 30; 20 INJECTION, SOLUTION INTRAVENOUS at 11:01

## 2023-01-05 RX ADMIN — LIDOCAINE HYDROCHLORIDE 40 MG: 20 INJECTION INTRAVENOUS at 11:01

## 2023-01-05 RX ADMIN — SODIUM CHLORIDE: 9 INJECTION, SOLUTION INTRAVENOUS at 11:01

## 2023-01-05 RX ADMIN — KETAMINE HYDROCHLORIDE 30 MG: 100 INJECTION, SOLUTION INTRAMUSCULAR; INTRAVENOUS at 11:01

## 2023-01-05 RX ADMIN — PROPOFOL 20 MG: 10 INJECTION, EMULSION INTRAVENOUS at 11:01

## 2023-01-05 RX ADMIN — ONDANSETRON 4 MG: 2 INJECTION INTRAMUSCULAR; INTRAVENOUS at 11:01

## 2023-01-05 RX ADMIN — KETAMINE HYDROCHLORIDE 20 MG: 100 INJECTION, SOLUTION INTRAMUSCULAR; INTRAVENOUS at 11:01

## 2023-01-05 RX ADMIN — FENTANYL CITRATE 50 MCG: 50 INJECTION, SOLUTION INTRAMUSCULAR; INTRAVENOUS at 11:01

## 2023-01-05 RX ADMIN — SCOPOLAMINE 1 PATCH: 1 PATCH TRANSDERMAL at 09:01

## 2023-01-05 RX ADMIN — INSULIN ASPART 4 UNITS: 100 INJECTION, SOLUTION INTRAVENOUS; SUBCUTANEOUS at 09:01

## 2023-01-05 NOTE — DISCHARGE INSTRUCTIONS
Ortho    · Keep follow up appointment at [facility].    · Take pain medication as prescribed.    · Keep arm elevated on pillow.    · No driving or consuming alcohol for the next 24 hours or while taking narcotic pain medicine.    · May apply ice pack to surgical area for 20 minutes at a time 6-8 times per day.    · Dressing: Leave clean and dry until follow up appointment.    · NO LIFTING OR PULLING WITH AFFECTED ARM until cleared by MD.    · Notify MD of any moderate to severe pain unrelieved by pain medicine or for any signs of infection including fever above 100.4, excessive redness or swelling, yellow/green foul- smelling drainage, nausea or vomiting. Call clinic at: [phone]. After business hours, if you are unable to reach a doctor on call at [phone] or your concern is an emergency, call [phone] or report to your nearest emergency room.    · Thanks for choosing [facility]! Have a smooth recovery!

## 2023-01-05 NOTE — ANESTHESIA POSTPROCEDURE EVALUATION
Anesthesia Post Evaluation    Patient: Kinza Ford    Procedure(s) Performed: Procedure(s) (LRB):  RELEASE, TRIGGER FINGER, RING (Right)    Final Anesthesia Type: MAC      Patient location during evaluation: OPS  Patient participation: Yes- Able to Participate  Level of consciousness: awake and alert and responds to stimulation  Post-procedure vital signs: reviewed and stable  Pain management: adequate  Airway patency: patent  MARYLOU mitigation strategies: Multimodal analgesia  PONV status at discharge: No PONV  Anesthetic complications: no      Cardiovascular status: hemodynamically stable  Respiratory status: spontaneous ventilation and room air  Hydration status: euvolemic  Follow-up not needed.          Vitals Value Taken Time   /72 01/05/23 0943   Temp 36.9 °C (98.4 °F) 01/05/23 0909   Pulse 72 01/05/23 0909   Resp 14 01/05/23 1207   SpO2 97 % 01/05/23 0909         No case tracking events are documented in the log.      Pain/Fallon Score: No data recorded

## 2023-01-05 NOTE — H&P
[facility]  H&P Note  2022         Patient ID: Kinza Ford  YOB: 1985  MRN: [id number]     Diagnosis:     There were no encounter diagnoses.      Chief Complaint: Pain of the Right Hand        Kinza Ford is a 37 y.o. female who presents as a new patient for treatment of the above mentioned diagnosis.  Patient presents today for follow-up of right ring finger trigger finger.  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  She states since that time she is had many events where the finger gets stuck and she has to physically pry it open to extend it.  She had an injection in April and another one in July that did provide her some relief but the symptoms have returned.  She is interested in surgical management.     Of note, patient has history of thyroid cancer that was previously in remission and is currently being worked up for a possible new mass.  States they are unsure if new mass or just thyroid tissue.  She also has diabetes and states her A1c is 6.     Occupation:    Sports/Hobbies:    Hand dominance: right handed  Smoking: n/a     Past Medical History:          Past Medical History:   Diagnosis Date    Acquired hypothyroidism      DM2 (diabetes mellitus, type 2)      Hx of papillary thyroid carcinoma      IBS (irritable bowel syndrome)      Migraines      Mixed hyperlipidemia              Past Surgical History:   Procedure Laterality Date     SECTION        COLONOSCOPY        ESOPHAGOGASTRODUODENOSCOPY        THYROIDECTOMY        TONSILLECTOMY        WISDOM TOOTH EXTRACTION                Family History   Problem Relation Age of Onset    No Known Problems Mother      Crohn's disease Father      Diabetes Mellitus Father        Social History           Socioeconomic History    Marital status:    Tobacco Use    Smoking status: Never    Smokeless tobacco: Never   Substance and Sexual  Activity    Alcohol use: Not Currently    Drug use: Never    Sexual activity: Yes           Medication List with Changes/Refills   Current Medications     ALBUTEROL (PROVENTIL/VENTOLIN HFA) 90 MCG/ACTUATION INHALER    1 puff every 6 (six) hours as needed. As needed for wheezing.     AZELASTINE (ASTELIN) 137 MCG (0.1 %) NASAL SPRAY    1 spray (137 mcg total) by Nasal route 2 (two) times daily.     BUPROPION (WELLBUTRIN XL) 300 MG 24 HR TABLET    Take 1 tablet (300 mg total) by mouth once daily.     BUSPIRONE (BUSPAR) 15 MG TABLET    Take 1 tablet (15 mg total) by mouth 2 (two) times a day.     CETIRIZINE (ZYRTEC) 10 MG TABLET    one tablet     CHOLECALCIFEROL, VITAMIN D3, (VITAMIN D3) 50 MCG (2,000 UNIT) CAP CAPSULE    1 capsule     CLINDAMYCIN (CLEOCIN) 150 MG CAPSULE    Take 2 capsules (300 mg total) by mouth 4 (four) times daily. for 10 days     DAPAGLIFLOZIN (FARXIGA) 10 MG TABLET    Take 1 tablet (10 mg total) by mouth once daily at 6am.     FLUOXETINE 20 MG CAPSULE    Take 1 capsule (20 mg total) by mouth once daily.     GABAPENTIN (NEURONTIN) 300 MG CAPSULE    1 capsule for pain     HYDROXYZINE PAMOATE (VISTARIL) 25 MG CAP    Take 1 capsule (25 mg total) by mouth daily as needed (anxiety or panic attack).     LEVOTHYROXINE (SYNTHROID, LEVOTHROID) 175 MCG TABLET    Take 1 tablet (175 mcg total) by mouth before breakfast.     MEDROXYPROGESTERONE (PROVERA) 10 MG TABLET    Take 10 mg by mouth 2 (two) times daily.     MELOXICAM (MOBIC) 15 MG TABLET    Take 15 mg by mouth daily as needed.     METFORMIN (GLUCOPHAGE-XR) 500 MG ER 24HR TABLET    Take 1,000 mg by mouth 2 (two) times daily.     ONETOUCH DELICA PLUS LANCET 33 GAUGE MISC    Apply topically 2 (two) times daily.     ONETOUCH VERIO TEST STRIPS STRP    2 (two) times daily.     SPIRONOLACTONE (ALDACTONE) 50 MG TABLET    1 tablet            Review of patient's allergies indicates:   Allergen Reactions    Adhesive         Other reaction(s): Burn    Sulfa  (sulfonamide antibiotics)         Other reaction(s): Unknown    Sulfamethoxazole         Other reaction(s): Rash         ROS:     There is no height or weight on file to calculate BMI.  GENERAL: Well appearing, appropriate for stated age, no acute distress.  CARDIOVASCULAR: Pulses regular by peripheral palpation.  PULMONARY: Respirations are even and non-labored.  NEURO: Awake, alert, and oriented x 3.  PSYCH: Mood & affect are appropriate.  HEENT: Head is normocephalic and atraumatic.     Physical Exam:     Right upper extremity  No wounds or abrasions  Tenderness to palpation over the A1 pulley of the ring finger   No tenderness to over the A1 pulley of the small finger, long finger, index finger, or thumb   No triggering on today on exam   Patient able to make full fist and fully extend the finger today  Small tender nodule over the A1 pulley of the ring finger   Negative Durkan's and Tinel's at the wrist  Negative Finkelstein  Negative CMC grind   Negative Tinel's at the elbow         Imaging:     No image results found.      Relevant imaging results reviewed and interpreted by me, discussed with the patient and / or family today. No new imaging     Assessment and Plan:     Kinza Ford is a 37 y.o. female seen in the office today for There were no encounter diagnoses.  37-year-old female with history of thyroid cancer and diabetes presenting for right ring finger trigger finger     Risks, benefits, and alternatives discussed with the patient.  Patient would like to proceed with operative management as she is failed conservative management with injections and it affects her work.  We will schedule her for right ring finger A1 pulley release on 1/5/22 with Dr. Casas.  Case request submitted.  Consent obtained.  Follow-up for surgery.  Weightbearing as tolerated.    Dr. Jayden Gaytan  [facility] Orthopaedic Surgery  Pager: [phone]

## 2023-01-05 NOTE — OP NOTE
[facility]                      [address] [place] [address]    PATIENT NAME:      MARIAM SAMUEL  YOB: 1985  CSN:               [id number]  MRN:               [id number]  ADMIT DATE:        01/05/2023 08:51:00  PHYSICIAN:         Hang Casas MD                          OPERATIVE REPORT      DATE OF SURGERY:    01/05/2023 00:00:00    SURGEON:  Hang Casas MD    PREOPERATIVE DIAGNOSIS:  Triggering, right ring finger.    POSTOPERATIVE DIAGNOSIS:  Triggering, right ring finger.    PROCEDURE:  A1 pulley release, right ring finger.    INDICATION FOR PROCEDURE:  Mariam Samuel is a 37-year-old with a longstanding   history of triggering of her right ring finger.  She presents for release.    ANESTHESIA:  MAC, local.    COMPLICATIONS:  None.    PROCEDURE IN DETAIL:  The patient was placed under MAC anesthesia, prepped and   draped in usual sterile fashion.  A 1 cm incision was made over the A1 pulley of   the right ring finger.  Dissection was carried out using tenotomy scissors, and   the A1 pulley was released in its entirety under direct visualization.  Nylon   was used to close the skin.  No complications.      I was present for key portions of procedure.        ______________________________  MD TRAV Beckman/AQS  DD:  01/05/2023  Time:  11:56AM  DT:  01/05/2023  Time:  12:08PM  Job #:  [id number]      OPERATIVE REPORT

## 2023-01-05 NOTE — BRIEF OP NOTE
Orthopedic Surgery Brief Op Discharge    Procedure: Right ring trigger finger release    Pre-op diagnosis: Right ring trigger finger     Postop diagnosis: same    Surgeon: MD Kory Appiah DO Austin Broussard, MD    Blood loss: <50cc    Fluids: see anesthesia documentation    Anesthesia: mac+regional    Complications: none      Patient presented to [facility] on day of scheduled surgery and Right ring trigger finger release, please see operative report for further detail. Patient did well postoperatively and was found to be fit for discharge on POD# 0.  Their pain was controlled.  The patient met all discharge criteria.  The patient was discharged home in stable condition. Patient was given paper prescriptions.  They were given a follow-up appointment in 2 weeks in clinic for a wound check and range of motion check.  All questions and concerns of the patient were answered to their satisfaction.    Condition: Patient tolerated procedure well, was extubated, and returned to the recovery unit in stable condition.     Post Op Instructions    -maintain dressing clean and dry until followup  -nonweightbearing right upper extremity  -multimodal pain control  -return to clinic in 2 weeks for wound recheck and suture removal    Please call our team with questions or concerns    Dr. Jayden Gaytan  \[facility]\"" Orthopaedic Surgery  Pager: [phone]

## 2023-01-05 NOTE — LETTER
January 5, 2023         [address] [address]  Phone: [phone]  Fax: [phone]       Patient: Kinza Ford   YOB: 1985  Date of Visit: 01/05/2023    To Whom It May Concern:    Please excuse Elijah Ford, 01/05/23 thru 01/06/23. She was at [facility] on 01/05/2023. The patient may return to school on 01/07/23 with no restrictions. If you have any questions or concerns, or if I can be of further assistance, please do not hesitate to contact me.    Sincerely,    Jasmine Cunningham RN

## 2023-01-06 VITALS
DIASTOLIC BLOOD PRESSURE: 60 MMHG | HEIGHT: 64 IN | BODY MASS INDEX: 34.4 KG/M2 | SYSTOLIC BLOOD PRESSURE: 92 MMHG | HEART RATE: 73 BPM | OXYGEN SATURATION: 100 % | RESPIRATION RATE: 16 BRPM | TEMPERATURE: 98 F | WEIGHT: 201.5 LBS

## 2023-01-06 LAB
POCT GLUCOSE: 147 MG/DL (ref 70–110)
POCT GLUCOSE: 180 MG/DL (ref 70–110)

## 2023-01-09 NOTE — DISCHARGE SUMMARY
Orthopedic Surgery Brief Op Discharge     Procedure: Right ring trigger finger release     Pre-op diagnosis: Right ring trigger finger      Postop diagnosis: same     Surgeon: MD Kory Appiah DO Austin Broussard, MD     Blood loss: <50cc     Fluids: see anesthesia documentation     Anesthesia: mac+regional     Complications: none        Patient presented to [facility] on day of scheduled surgery and Right ring trigger finger release, please see operative report for further detail. Patient did well postoperatively and was found to be fit for discharge on POD# 0.  Their pain was controlled.  The patient met all discharge criteria.  The patient was discharged home in stable condition. Patient was given paper prescriptions.  They were given a follow-up appointment in 2 weeks in clinic for a wound check and range of motion check.  All questions and concerns of the patient were answered to their satisfaction.     Condition: Patient tolerated procedure well, was extubated, and returned to the recovery unit in stable condition.      Post Op Instructions     -maintain dressing clean and dry until followup  -nonweightbearing right upper extremity  -multimodal pain control  -return to clinic in 2 weeks for wound recheck and suture removal     Please call our team with questions or concerns     Dr. Jayden Gaytan  [facility] Orthopaedic Surgery  Pager: [phone]

## 2023-01-17 DIAGNOSIS — E11.9 TYPE 2 DIABETES MELLITUS WITHOUT COMPLICATION, WITHOUT LONG-TERM CURRENT USE OF INSULIN: ICD-10-CM

## 2023-01-17 RX ORDER — DAPAGLIFLOZIN 10 MG/1
10 TABLET, FILM COATED ORAL DAILY
Qty: 30 TABLET | Refills: 3 | Status: SHIPPED | OUTPATIENT
Start: 2023-01-17 | End: 2023-06-15 | Stop reason: SDUPTHER

## 2023-01-18 ENCOUNTER — OFFICE VISIT (OUTPATIENT)
Dept: ORTHOPEDICS | Facility: CLINIC | Age: 38
End: 2023-01-18
Payer: MEDICAID

## 2023-01-18 VITALS
BODY MASS INDEX: 34.31 KG/M2 | DIASTOLIC BLOOD PRESSURE: 81 MMHG | SYSTOLIC BLOOD PRESSURE: 111 MMHG | WEIGHT: 201 LBS | RESPIRATION RATE: 18 BRPM | HEIGHT: 64 IN | HEART RATE: 74 BPM | OXYGEN SATURATION: 100 %

## 2023-01-18 DIAGNOSIS — E78.5 HYPERLIPIDEMIA, UNSPECIFIED HYPERLIPIDEMIA TYPE: ICD-10-CM

## 2023-01-18 DIAGNOSIS — M65.341 TRIGGER RING FINGER OF RIGHT HAND: Primary | ICD-10-CM

## 2023-01-18 DIAGNOSIS — E55.9 VITAMIN D DEFICIENCY: Primary | ICD-10-CM

## 2023-01-18 PROCEDURE — 3079F DIAST BP 80-89 MM HG: CPT | Mod: CPTII,,, | Performed by: SPECIALIST

## 2023-01-18 PROCEDURE — 1159F MED LIST DOCD IN RCRD: CPT | Mod: CPTII,,, | Performed by: SPECIALIST

## 2023-01-18 PROCEDURE — 3066F PR DOCUMENTATION OF TREATMENT FOR NEPHROPATHY: ICD-10-PCS | Mod: CPTII,,, | Performed by: SPECIALIST

## 2023-01-18 PROCEDURE — 3074F PR MOST RECENT SYSTOLIC BLOOD PRESSURE < 130 MM HG: ICD-10-PCS | Mod: CPTII,,, | Performed by: SPECIALIST

## 2023-01-18 PROCEDURE — 99024 PR POST-OP FOLLOW-UP VISIT: ICD-10-PCS | Mod: ,,, | Performed by: SPECIALIST

## 2023-01-18 PROCEDURE — 3008F PR BODY MASS INDEX (BMI) DOCUMENTED: ICD-10-PCS | Mod: CPTII,,, | Performed by: SPECIALIST

## 2023-01-18 PROCEDURE — 3066F NEPHROPATHY DOC TX: CPT | Mod: CPTII,,, | Performed by: SPECIALIST

## 2023-01-18 PROCEDURE — 3061F PR NEG MICROALBUMINURIA RESULT DOCUMENTED/REVIEW: ICD-10-PCS | Mod: CPTII,,, | Performed by: SPECIALIST

## 2023-01-18 PROCEDURE — 3008F BODY MASS INDEX DOCD: CPT | Mod: CPTII,,, | Performed by: SPECIALIST

## 2023-01-18 PROCEDURE — 99024 POSTOP FOLLOW-UP VISIT: CPT | Mod: ,,, | Performed by: SPECIALIST

## 2023-01-18 PROCEDURE — 3074F SYST BP LT 130 MM HG: CPT | Mod: CPTII,,, | Performed by: SPECIALIST

## 2023-01-18 PROCEDURE — 3061F NEG MICROALBUMINURIA REV: CPT | Mod: CPTII,,, | Performed by: SPECIALIST

## 2023-01-18 PROCEDURE — 3079F PR MOST RECENT DIASTOLIC BLOOD PRESSURE 80-89 MM HG: ICD-10-PCS | Mod: CPTII,,, | Performed by: SPECIALIST

## 2023-01-18 PROCEDURE — 99214 OFFICE O/P EST MOD 30 MIN: CPT | Mod: PBBFAC

## 2023-01-18 PROCEDURE — 1159F PR MEDICATION LIST DOCUMENTED IN MEDICAL RECORD: ICD-10-PCS | Mod: CPTII,,, | Performed by: SPECIALIST

## 2023-01-18 RX ORDER — ASPIRIN 325 MG
50000 TABLET, DELAYED RELEASE (ENTERIC COATED) ORAL
Qty: 12 CAPSULE | Refills: 0 | Status: SHIPPED | OUTPATIENT
Start: 2023-01-18 | End: 2023-05-26

## 2023-01-18 RX ORDER — ATORVASTATIN CALCIUM 10 MG/1
10 TABLET, FILM COATED ORAL DAILY
Qty: 90 TABLET | Refills: 3 | Status: SHIPPED | OUTPATIENT
Start: 2023-01-18 | End: 2023-03-28 | Stop reason: SDUPTHER

## 2023-01-18 NOTE — PROGRESS NOTES
Past Medical History:   Diagnosis Date    Acquired hypothyroidism     DM2 (diabetes mellitus, type 2)     Hx of papillary thyroid carcinoma     IBS (irritable bowel syndrome)     Migraines     Mixed hyperlipidemia        Past Surgical History:   Procedure Laterality Date     SECTION      COLONOSCOPY      ESOPHAGOGASTRODUODENOSCOPY      HYSTERECTOMY, TOTAL, VAGINAL, WITH CYSTOSCOPY  2021    MODIFIED [facility] CULDOPLASTY    THYROIDECTOMY  2021    TOTAL 2/2 PAPILLARY THRYROID CARCINOMA    TONSILLECTOMY  2008    ADENOIDECTOMY    TRIGGER FINGER RELEASE Right 2023    Procedure: RELEASE, TRIGGER FINGER, RING;  Surgeon: Hang Casas MD;  Location: [facility];  Service: Plastics;  Laterality: Right;    URETERAL STENT PLACEMENT Right 2008    WISDOM TOOTH EXTRACTION         Current Outpatient Medications   Medication Sig    albuterol (PROVENTIL/VENTOLIN HFA) 90 mcg/actuation inhaler 1 puff every 6 (six) hours as needed. As needed for wheezing.    atorvastatin (LIPITOR) 10 MG tablet Take 1 tablet (10 mg total) by mouth once daily.    azelastine (ASTELIN) 137 mcg (0.1 %) nasal spray 1 spray (137 mcg total) by Nasal route 2 (two) times daily.    buPROPion (WELLBUTRIN XL) 300 MG 24 hr tablet Take 1 tablet (300 mg total) by mouth once daily.    busPIRone (BUSPAR) 15 MG tablet Take 1 tablet (15 mg total) by mouth 2 (two) times a day.    cetirizine (ZYRTEC) 10 MG tablet one tablet    cholecalciferol, vitamin D3, 1,250 mcg (50,000 unit) capsule Take 1 capsule (50,000 Units total) by mouth every 7 days.    dapagliflozin (FARXIGA) 10 mg tablet Take 1 tablet (10 mg total) by mouth Daily.    FLUoxetine 20 MG capsule Take 1 capsule (20 mg total) by mouth once daily.    hydrOXYzine pamoate (VISTARIL) 25 MG Cap Take 1 capsule (25 mg total) by mouth daily as needed (anxiety or panic attack).    levothyroxine (SYNTHROID, LEVOTHROID) 175 MCG tablet Take 1 tablet (175 mcg total) by mouth before breakfast.     linaGLIPtin (TRADJENTA) 5 mg Tab tablet Take 1 tablet (5 mg total) by mouth once daily.    metFORMIN (GLUCOPHAGE-XR) 500 MG ER 24hr tablet Take 1,000 mg by mouth 2 (two) times daily.    ONETOUCH DELICA PLUS LANCET 33 gauge Misc Apply topically 2 (two) times daily.    ONETOUCH VERIO TEST STRIPS Strp 2 (two) times daily.    spironolactone (ALDACTONE) 50 MG tablet 1 tablet    ibuprofen (ADVIL,MOTRIN) 800 MG tablet Take 1 tablet (800 mg total) by mouth 3 (three) times daily. for 14 days (Patient not taking: Reported on 1/18/2023)    ondansetron (ZOFRAN) 4 MG tablet Take 1 tablet (4 mg total) by mouth 2 (two) times daily. for 14 days (Patient not taking: Reported on 1/18/2023)     No current facility-administered medications for this visit.     Facility-Administered Medications Ordered in Other Visits   Medication    dextrose 10% bolus 125 mL 125 mL    dextrose 10% bolus 125 mL 125 mL    insulin aspart U-100 injection 2-9 Units    insulin aspart U-100 injection 4-12 Units    LIDOcaine (PF) 10 mg/ml (1%) injection 10 mg    scopolamine 1.3-1.5 mg (1 mg over 3 days) 1 patch       Review of patient's allergies indicates:   Allergen Reactions    Adhesive      Other reaction(s): Burn    Sulfa (sulfonamide antibiotics)      Other reaction(s): Unknown    Sulfamethoxazole      Other reaction(s): Rash       Family History   Problem Relation Age of Onset    No Known Problems Mother     Crohn's disease Father     Diabetes Mellitus Father        Social History     Socioeconomic History    Marital status:    Tobacco Use    Smoking status: Never    Smokeless tobacco: Never   Substance and Sexual Activity    Alcohol use: Not Currently     Comment: socially    Drug use: Never    Sexual activity: Yes       Chief Complaint:   Chief Complaint   Patient presents with    Right Hand - Post-op Evaluation       Consulting Physician: No ref. provider found    History of present illness:    This is a 37 y.o. year old female who is doing  "great postop from right hand ring finger trigger finger release 2 weeks ago.  She is no pain or tenderness.  She says she feels great and has no pain and no further triggering.  Review of Systems:    Constitution:   Denies chills, fever, and sweats.  HENT:   Denies headaches or blurry vision.  Cardiovascular:  Denies chest pain or irregular heart beat.  Respiratory:   Denies cough or shortness of breath.  Gastrointestinal:  Denies abdominal pain, nausea, or vomiting.  Musculoskeletal:   Denies muscle cramps.  Neurological:   Denies dizziness or focal weakness.  Psychiatric/Behavior: Normal mental status.  Hematology/Lymph:  Denies bleeding problem or easy bruising/bleeding.  Skin:    Denies rash or suspicious lesions.    Examination:    Vital Signs:    Vitals:    01/18/23 1305   BP: 111/81   Pulse: 74   Resp: 18   SpO2: 100%   Weight: 91.2 kg (201 lb)   Height: 5' 4" (1.626 m)       Body mass index is 34.5 kg/m².    Constitution:   Well-developed, well nourished patient in no acute distress.  Neurological:   Alert and oriented x 3 and cooperative to examination.     Psychiatric/Behavior: Normal mental status.  Respiratory:   No shortness of breath.  Eyes:    Extraoccular muscles intact  Skin:    No scars, rash or suspicious lesions.    Physical Exam:  Right hand exam:   Well-healed surgical scar over the A1 pulley at the base of the right ring finger on the volar side.    No evidence of infection   No swelling, no redness, no increased heat   Full active and passive range of motion of all 5 fingers with no triggering   Normal sensory and motor function  Normal capillary refill  2+ radial pulse          Assessment: Trigger ring finger of right hand        Plan:  Sutures removed   Resume full activity and follow-up p.r.n.      DISCLAIMER: This note may have been dictated using voice recognition software and may contain grammatical errors.     NOTE: Consult report sent to referring provider via EPIC EMR.    "

## 2023-01-18 NOTE — PROGRESS NOTES
I have sent medications and/or lab orders in for this patient.  Please notify the patient. Her cholesterol remains high and given her diabetes, we will need to start her on medication to lower this.  I have sent to pharmacy.  Also, Vitamin d level is low at 17.  I am sending prescription for high dose vitamin d to pharmacy for her to start.  She can stop the 2,000IU OTC and start this one.  We will repeat Vit D in 3 months.  She will take the high dose once a week.     No orders of the defined types were placed in this encounter.      Medications Ordered This Encounter   Medications    atorvastatin (LIPITOR) 10 MG tablet     Sig: Take 1 tablet (10 mg total) by mouth once daily.     Dispense:  90 tablet     Refill:  3    cholecalciferol, vitamin D3, 1,250 mcg (50,000 unit) capsule     Sig: Take 1 capsule (50,000 Units total) by mouth every 7 days.     Dispense:  12 capsule     Refill:  0       Thyroid level was over 5 on labs.  Dr. Ordaz will address at 1/19/23 visit.

## 2023-01-19 ENCOUNTER — OFFICE VISIT (OUTPATIENT)
Dept: ENDOCRINOLOGY | Facility: CLINIC | Age: 38
End: 2023-01-19
Payer: MEDICAID

## 2023-01-19 VITALS
RESPIRATION RATE: 16 BRPM | SYSTOLIC BLOOD PRESSURE: 109 MMHG | WEIGHT: 200.81 LBS | DIASTOLIC BLOOD PRESSURE: 78 MMHG | HEART RATE: 90 BPM | TEMPERATURE: 99 F | HEIGHT: 65 IN | BODY MASS INDEX: 33.46 KG/M2

## 2023-01-19 DIAGNOSIS — E55.9 VITAMIN D DEFICIENCY: ICD-10-CM

## 2023-01-19 DIAGNOSIS — E89.0 POSTSURGICAL HYPOTHYROIDISM: ICD-10-CM

## 2023-01-19 DIAGNOSIS — E11.65 UNCONTROLLED TYPE 2 DIABETES MELLITUS WITH HYPERGLYCEMIA: ICD-10-CM

## 2023-01-19 DIAGNOSIS — C73 THYROID CANCER: Primary | ICD-10-CM

## 2023-01-19 PROCEDURE — 99215 OFFICE O/P EST HI 40 MIN: CPT | Mod: PBBFAC

## 2023-01-19 RX ORDER — PIOGLITAZONEHYDROCHLORIDE 15 MG/1
15 TABLET ORAL DAILY
Qty: 90 TABLET | Refills: 3 | Status: SHIPPED | OUTPATIENT
Start: 2023-01-19 | End: 2023-06-28

## 2023-01-19 RX ORDER — LEVOTHYROXINE SODIUM 200 UG/1
200 TABLET ORAL
Qty: 30 TABLET | Refills: 11 | Status: SHIPPED | OUTPATIENT
Start: 2023-01-19 | End: 2023-05-26 | Stop reason: SDUPTHER

## 2023-01-19 RX ORDER — EPINEPHRINE 0.3 MG/.3ML
INJECTION SUBCUTANEOUS
COMMUNITY
Start: 2022-12-28

## 2023-01-19 RX ORDER — DULAGLUTIDE 0.75 MG/.5ML
0.75 INJECTION, SOLUTION SUBCUTANEOUS
Qty: 4 PEN | Refills: 11 | Status: SHIPPED | OUTPATIENT
Start: 2023-01-19 | End: 2023-05-26

## 2023-01-19 NOTE — PROGRESS NOTES
"[facility] Visit    Chief Complaint:      Thyroid Cancer     Subjective:     HPI:  Kinza Ford is a 37 y.o. female who presents to Endocrinology clinic today for Thyroid Cancer.  Patient reports a history of thyroid cancer diagnosed in July 2021 and had a thyroidectomy in August 2021.  Patient referred to endocrinology clinic by ENT for further evaluation of her thyroid cancer surveillance.  Patient had an elevated globulin tumor marker level of 4.8 in November 2022.  She had repeat ultrasound of the neck showing mild scarring at site of thyroidectomy.  CT of the neck showed no pathology to me bed, no acute pathology disease at the neck.  Patient also has history, most recent A1c of 9.6 on labs yesterday.  Reports strong commitment to lifestyle modifications including avoiding high carb foods, small frequent meals, and also exercise.       Review of Systems  Review of Systems   Constitutional:  Negative for chills, diaphoresis, fever, malaise/fatigue and weight loss.   Respiratory:  Negative for cough, hemoptysis, sputum production, shortness of breath and wheezing.    Cardiovascular:  Negative for chest pain, palpitations, orthopnea, claudication, leg swelling and PND.   Gastrointestinal:  Negative for abdominal pain, blood in stool, constipation, diarrhea, heartburn, melena, nausea and vomiting.   Genitourinary:  Negative for dysuria, flank pain, frequency, hematuria and urgency.   Skin:  Negative for itching and rash.   Neurological:  Negative for focal weakness and headaches.     Objective:   Last 24 Hour Vital Signs:  Vitals  BP: 109/78  Temp: 98.5 °F (36.9 °C)  Temp src: Oral  Pulse: 90  Resp: 16  Height: 5' 4.8" (164.6 cm)  Weight: 91.1 kg (200 lb 13.4 oz)    Physical Examination:  General: well developed; pleasant and cooperative  HEENT: Normocephalic, atraumatic. Face symmetric.  Cardiovascular: regular rate, well perfused  Pulmonary: Bilateral symmetric chest rise. " Non-labored  Skin:  Exposed skin is warm & dry.  Extremities: No clubbing, cyanosis or edema.  Neuro:   Patient moves all extremities equally. No signs of peripheral neurological deficit      Assessment & Plan:     Hx of stage 1 papillary thyroid cancer s/p thyroidectomy   Subsequent hypothyroidism    - thyroidectomy in August 2021   - Repat US 11/21/22 revealing post total thyroidectomy with probable mild   scarring at the left hemithyroidectomy bed.  - CT Soft tissue neck 12/1/2022: Post thyroidectomy with no pathology identified at the thyroidectomy bed.  No acute pathology or metastatic disease identified at the neck. No significant interval change compared to the neck CT with contrast 09/22/2021.  - TSH 5.371 1/18/23 from 1.813   - goal TSH .  - T4 1.02 on 1/18/23 from 1.39 three wks ago  - thyroglobulin tumor marker 4.8 on 11/25/22,    - thyroglobulin 1/18/22 pending  - will increase LT4 from 175 mcg to 200 mcg    DMT2    - A1c of 9.6 on 1/18/23   - Continue metformin 1000mg BID   - Change trajenta to trulicity 0.75 mg/wk    - Start pioglitazone 15 mg every day    Vit D deficiency    - Vit D 17.2 on1/18/23   - Take Vit D 50,000 U/week for three months    Follow-ups  -Follow-up in 4 months    Jose Hong DO  [facility] Internal Medicine PGY-1

## 2023-01-20 ENCOUNTER — TELEPHONE (OUTPATIENT)
Dept: ENDOCRINOLOGY | Facility: CLINIC | Age: 38
End: 2023-01-20
Payer: MEDICAID

## 2023-01-20 DIAGNOSIS — C73 THYROID CA: Primary | ICD-10-CM

## 2023-01-20 DIAGNOSIS — E11.9 TYPE 2 DIABETES MELLITUS WITHOUT COMPLICATION, UNSPECIFIED WHETHER LONG TERM INSULIN USE: ICD-10-CM

## 2023-01-20 NOTE — TELEPHONE ENCOUNTER
Tg is back and stably back down to around 1 in the indeterminate range with recs to continue to follow as per prior given her low risk thyroid cancer history.  Recommend for now, follow plan per visit for DM and increased LT4 w/ course of ergocalciferol.  Labs prior to f/u as booked 5/23 including TSH, tg, vit d, iPTH, renal panel, hga1c.

## 2023-01-23 NOTE — TELEPHONE ENCOUNTER
Spoke with patient informed Tg is back, stable and back down to around 1 in the indeterminate range.  Dr. Goodman recommends to continue to follow as per prior given her low risk thyroid cancer history.  For now, follow plan per visit for DM and increased LT4 w/ course of ergocalciferol and will obtain labs prior to return appointment in May.  Voiced understanding, no questions.

## 2023-02-06 ENCOUNTER — CLINICAL SUPPORT (OUTPATIENT)
Dept: DIABETES | Facility: CLINIC | Age: 38
End: 2023-02-06
Payer: MEDICAID

## 2023-02-06 VITALS — BODY MASS INDEX: 33.74 KG/M2 | WEIGHT: 201.5 LBS

## 2023-02-06 DIAGNOSIS — E11.65 UNCONTROLLED TYPE 2 DIABETES MELLITUS WITH HYPERGLYCEMIA: ICD-10-CM

## 2023-02-06 DIAGNOSIS — E89.0 POSTSURGICAL HYPOTHYROIDISM: ICD-10-CM

## 2023-02-06 DIAGNOSIS — E55.9 VITAMIN D DEFICIENCY: ICD-10-CM

## 2023-02-06 DIAGNOSIS — C73 THYROID CANCER: ICD-10-CM

## 2023-02-06 PROCEDURE — G0108 DIAB MANAGE TRN  PER INDIV: HCPCS | Mod: PBBFAC,PN | Performed by: DIETITIAN, REGISTERED

## 2023-02-06 PROCEDURE — 99212 OFFICE O/P EST SF 10 MIN: CPT | Mod: PBBFAC,PN | Performed by: DIETITIAN, REGISTERED

## 2023-02-07 NOTE — PROGRESS NOTES
Diabetes Care Specialist Progress Note  Author: Kellen Haynes RD  Date: 2/7/2023    Program Intake  Reason for Diabetes Program Visit:: Initial Diabetes Assessment  Current diabetes risk level:: moderate    Lab Results   Component Value Date    HGBA1C 9.6 (H) 01/18/2023       Clinical         Problem Review  Reviewed Problem List with Patient: yes  Active comorbidities affecting diabetes self-care.: yes  Comorbidities: Other (comment) (hyperlipidemia)    Clinical Assessment  Have you ever experienced hypoglycemia (low blood sugar)?: yes  Are you able to tell when your blood sugar is low?: Yes  What symptoms do you experience?: Sweaty, Tiredness  How do you treat hypoglycemia (low blood sugar)?:  (nuts)    Medication Information  How do you obtain your medications?: Patient drives  How many days a week do you miss your medications?: Never  Medication adherence impacting ability to self-manage diabetes?: No    Labs  Do you have regular lab work to monitor your medications?: Yes  Lab Compliance Barriers: No    Nutritional Status  Diet: Diabetic diet  Meal Plan 24 Hour Recall: Breakfast, Lunch, Dinner  Meal Plan 24 Hour Recall - Breakfast: egg omlet with veggies and beans  Meal Plan 24 Hour Recall - Lunch: eats 6 small meals daily such as yogurt or nuts  Meal Plan 24 Hour Recall - Dinner: low carb tortilla with meat and veggies  Change in appetite?: Yes (decreased appetite)  Current nutritional status an area of need that is impacting patient's ability to self-manage diabetes?: No    Additional Social History    Support  Does anyone support you with your diabetes care?: yes  Who supports you?: spouse  Who takes you to your medical appointments?: self  Does the current support meet the patient's needs?: Yes  Is Support an area impacting ability to self-manage diabetes?: No    Access to Mass Media & Technology  Does the patient have access to any of the following devices or technologies?: Tablet  Media or technology  needs impacting ability to self-manage diabetes?: No    Cognitive/Behavioral Health  Alert and Oriented: Yes  Difficulty Thinking: No  Requires Prompting: No  Requires assistance for routine expression?: No  Cognitive or behavioral barriers impacting ability to self-manage diabetes?: No         Communication  Language preference: English  Hearing Problems: No  Vision Problems: No  Communication needs impacting ability to self-manage diabetes?: No    Health Literacy  Health literacy needs impacting ability to self-manage diabetes?: No      Diabetes Self-Management Skills Assessment    Diabetes Disease Process/Treatment Options  Diabetes Disease Process/Treatment Options: Skills Assessment Completed: No  Deferred due to:: Time    Nutrition/Healthy Eating  Method of carbohydrate measurement:: carb counting/reading labels  Patient can identify foods that impact blood sugar.: yes  Patient-identified foods:: fruit/fruit juice, starches (bread, pasta, rice, cereal), milk, starchy vegetables (corn, peas, beans), sweets, soda  Nutrition/Healthy Eating Skills Assessment Completed:: Yes  Assessment indicates:: Adequate understanding  Area of need?: No    Physical Activity/Exercise  Patient's daily activity level:: constantly moving  Patient formally exercises outside of work.: yes  Intensity: Moderate  Frequency: four or more times a week  Duration: 1 hour  Patient can identify forms of physical activity.: yes  Patient can identify reasons why exercise/physical activity is important in diabetes management.: yes  Identified reasons:: lowers blood glucose, blood pressure, and cholesterol  Physical Activity/Exercise Skills Assessment Completed: : Yes  Assessment indicates:: Adequate understanding  Area of need?: Yes    Medications  Patient is able to describe current diabetes management routine.: yes  Diabetes management routine:: injectable medications, diet, exercise, oral medications  Patient is able to identify current  diabetes medications, dosages, and appropriate timing of medications.: yes (Farxiga 10 mg, Trulicity .75 mg and Metformin 1000mg BID)  Patient understands the purpose of the medications taken for diabetes.: yes  Patient reports problems or concerns with current medication regimen.: no  Medication Skills Assessment Completed:: Yes  Assessment indicates:: Instruction Needed  Area of need?: Yes    Home Blood Glucose Monitoring  Patient states that blood sugar is checked at home daily.: yes  Monitoring Method:: home glucometer  How often do you check your blood sugar?: Twice a day  When you check what is your typical blood sugar range? : -150 and is typically <130 during day due to activity/work/exercise  Blood glucose logs reviewed today?: no  Home Blood Glucose Monitoring Skills Assessment Completed: : Yes  Assessment indicates:: Adequate understanding  Area of need?: No    Acute Complications  Patient is able to identify types of acute complications: Yes  Patient Identified:: Hypoglycemia, Hyperglycemia  Able to state proper treatment of hypoglycemia?: no (see comments) (treats with nuts)  Acute Complications Skills Assessment Completed: : Yes  Assessment indicates:: Instruction Needed  Area of need?: Yes    Chronic Complications  Chronic Complications Skills Assessment Completed: : No  Deferred due to:: Time    Psychosocial/Coping  Patient can identify ways of coping with chronic disease.: yes  Patient-stated ways of coping with chronic disease:: support from loved ones  Psychosocial/Coping Skills Assessment Completed: : Yes  Assessment indicates:: Adequate understanding  Area of need?: No      Diabetes Self Support Plan         Assessment Summary and Plan    Based on today's diabetes care assessment, the following areas of need were identified:      Social 2/6/2023   Support No   Access to Mass Media/Tech No   Cognitive/Behavioral Health No   Communication No   Health Literacy No        Clinical 2/6/2023    Medication Adherence No   Lab Compliance No   Nutritional Status No        Diabetes Self-Management Skills 2/6/2023   Nutrition/Healthy Eating No   Physical Activity/Exercise Yes - reviewed monitoring and treating hypoglycemia during times of increased activity (work and exercise)   Medication Yes - Provided review of current diabetes medication action, dosage, frequency, side effects, and storage guidelines. .    Home Blood Glucose Monitoring No   Acute Complications Yes - Discussed guidelines for preventing, detecting and treating hypoglycemia and hyperglycemia   Psychosocial/Coping No          Today's interventions were provided through individual discussion, instruction, and written materials were provided.  Pt has attended DM ed before and is very knowledgeable regarding treatments. Diet recall indicates low carb lifestyle and moderates portions. Eats 6 small meals daily and drinks mostly water. Exercises 3-5 times weekly. Feels her A1C increased due to thyroid issues which is better controlled now (hx of thyroid cancer). Reports -150 and 100-115 during the day. She is active during the day working on her feet, caring for five children and has an evening job several nights per week. Taking Farxiga 10 mg daily, Metformin 1000mg BID and is give third dose of Trulicity .75 mg this week.    Patient verbalized understanding of instruction and written materials.  Pt was able to return back demonstration of instructions today. Patient understood key points, needs reinforcement and further instruction.     Diabetes Self-Management Care Plan:    Today's Diabetes Self-Management Care Plan was developed with Kinza's input. Echols has agreed to work toward the following goal(s) to improve his/her overall diabetes control.      Care Plan: Diabetes Management   Updates made since 1/8/2023 12:00 AM        Problem: Acute Complications         Goal: Patient agrees to identify and manage signs and symptoms of high/low  blood sugar (hyper/hypoglycemia) and using proper treatment.    Start Date: 2/7/2023   Priority: Medium   Barriers: No Barriers Identified        Task: Reviewed proper treatment of hypoglycemia with the rule of 15--patient to eat 15g simple carbohydrate (4 glucose tablets, 1 glucose gel, 5 pieces hard candy, ½ cup fruit juice, ½ can regular soda, etc) and wait 15 minutes and recheck home glucose. Completed 2/7/2023        Task: Reviewed signs and symptoms of hyper/hypoglycemia, what range is considered to be hyper/hypoglycemia, and when to seek further medical attention. Completed 2/7/2023          Follow Up Plan     Follow up in about 2 months (around 4/6/2023) for glucose log review, chronic complications, Disease Process, health maintenance, medication.    Today's care plan and follow up schedule was discussed with patient.  Echols verbalized understanding of the care plan, goals, and agrees to follow up plan.        The patient was encouraged to communicate with his/her health care provider/physician and care team regarding his/her condition(s) and treatment.  I provided the patient with my contact information today and encouraged to contact me via phone or [facility] Patient Portal as needed.     Length of Visit   Total Time: 30 Minutes

## 2023-03-28 ENCOUNTER — OFFICE VISIT (OUTPATIENT)
Dept: FAMILY MEDICINE | Facility: CLINIC | Age: 38
End: 2023-03-28
Payer: MEDICAID

## 2023-03-28 DIAGNOSIS — E78.5 HYPERLIPIDEMIA, UNSPECIFIED HYPERLIPIDEMIA TYPE: ICD-10-CM

## 2023-03-28 DIAGNOSIS — E55.9 VITAMIN D DEFICIENCY: ICD-10-CM

## 2023-03-28 DIAGNOSIS — E11.9 TYPE 2 DIABETES MELLITUS WITHOUT COMPLICATION, WITHOUT LONG-TERM CURRENT USE OF INSULIN: Primary | ICD-10-CM

## 2023-03-28 PROBLEM — E11.65 UNCONTROLLED TYPE 2 DIABETES MELLITUS WITH HYPERGLYCEMIA: Status: RESOLVED | Noted: 2023-01-19 | Resolved: 2023-03-28

## 2023-03-28 PROCEDURE — 1159F PR MEDICATION LIST DOCUMENTED IN MEDICAL RECORD: ICD-10-PCS | Mod: CPTII,95,, | Performed by: NURSE PRACTITIONER

## 2023-03-28 PROCEDURE — 3061F NEG MICROALBUMINURIA REV: CPT | Mod: CPTII,95,, | Performed by: NURSE PRACTITIONER

## 2023-03-28 PROCEDURE — 1159F MED LIST DOCD IN RCRD: CPT | Mod: CPTII,95,, | Performed by: NURSE PRACTITIONER

## 2023-03-28 PROCEDURE — 3061F PR NEG MICROALBUMINURIA RESULT DOCUMENTED/REVIEW: ICD-10-PCS | Mod: CPTII,95,, | Performed by: NURSE PRACTITIONER

## 2023-03-28 PROCEDURE — 1160F RVW MEDS BY RX/DR IN RCRD: CPT | Mod: CPTII,95,, | Performed by: NURSE PRACTITIONER

## 2023-03-28 PROCEDURE — 3066F PR DOCUMENTATION OF TREATMENT FOR NEPHROPATHY: ICD-10-PCS | Mod: CPTII,95,, | Performed by: NURSE PRACTITIONER

## 2023-03-28 PROCEDURE — 99214 PR OFFICE/OUTPT VISIT, EST, LEVL IV, 30-39 MIN: ICD-10-PCS | Mod: 95,,, | Performed by: NURSE PRACTITIONER

## 2023-03-28 PROCEDURE — 3066F NEPHROPATHY DOC TX: CPT | Mod: CPTII,95,, | Performed by: NURSE PRACTITIONER

## 2023-03-28 PROCEDURE — 1160F PR REVIEW ALL MEDS BY PRESCRIBER/CLIN PHARMACIST DOCUMENTED: ICD-10-PCS | Mod: CPTII,95,, | Performed by: NURSE PRACTITIONER

## 2023-03-28 PROCEDURE — 99214 OFFICE O/P EST MOD 30 MIN: CPT | Mod: 95,,, | Performed by: NURSE PRACTITIONER

## 2023-03-28 RX ORDER — BLOOD-GLUCOSE METER
1 EACH MISCELLANEOUS 2 TIMES DAILY
Qty: 200 EACH | Refills: 11 | Status: SHIPPED | OUTPATIENT
Start: 2023-03-28 | End: 2023-06-28

## 2023-03-28 RX ORDER — LANCETS 33 GAUGE
1 EACH MISCELLANEOUS 2 TIMES DAILY
Qty: 100 EACH | Refills: 11 | Status: SHIPPED | OUTPATIENT
Start: 2023-03-28 | End: 2023-06-28

## 2023-03-28 RX ORDER — ATORVASTATIN CALCIUM 10 MG/1
10 TABLET, FILM COATED ORAL DAILY
Qty: 90 TABLET | Refills: 3 | Status: SHIPPED | OUTPATIENT
Start: 2023-03-28 | End: 2023-06-28

## 2023-03-28 RX ORDER — ASPIRIN 325 MG
50000 TABLET, DELAYED RELEASE (ENTERIC COATED) ORAL
Qty: 12 CAPSULE | Refills: 0 | Status: SHIPPED | OUTPATIENT
Start: 2023-03-28 | End: 2023-05-26 | Stop reason: SDUPTHER

## 2023-03-28 NOTE — ASSESSMENT & PLAN NOTE
High dose vitamin D sent to pharmacy.  Educated on increasing foods high in Vitamin D such as fish oil, cod liver oil, salmon, milk fortified with vitamin D.  RX Vitamin D3 08080 IU weekly x 12 weeks.  Complete entire 12 weeks of Vitamin D prescription.  After completion of prescription (12 weeks/3 months), begin taking Vitamin D 2000 I.U. tablets daily (purchase over the counter).  Repeat Vitamin D level as ordered.

## 2023-03-28 NOTE — ASSESSMENT & PLAN NOTE
Continue Trulicity as directed, pt thinks it needs to be increased  Keep all appt with Dr. Ordaz  Encouraged ACE/ARB/Statin according to guidelines.  Follow ADA Diet. Avoid soda, simple sweets, and limit rice/pasta/breads/starches.  Maintain healthy weight with goal BMI <30. Exercise 5 times per week for 30 minutes per day.  Stressed importance of daily foot exams.  Stressed importance of annual dilated eye exam.  Last foot exam: July 2022  Last eye exam: July 2022  Last micro albumin: July 2022    Lab Results   Component Value Date    HGBA1C 9.6 (H) 01/18/2023     Lab Results   Component Value Date    CREATININE 0.82 01/18/2023

## 2023-03-28 NOTE — PROGRESS NOTES
Patient Name: Kinza Ford   : 1985  MRN: [id number]     SUBJECTIVE DATA:    CHIEF COMPLAINT:   Kinza Ford is a 37 y.o. female who presents to clinic today with Follow-up (3 month f/u, Refill)        HPI:  3/28/23:  Virtual audio telemed visit for FU 3 months HLD. Pt followed by psychiatry, endocrine, and orthopedics.    HLD: last FLP in july was WNL. Due for repeat in July. Needs refill on Atorvastatin  DM: last hgbA1c 9.6.  Endo added Trulicity. Diet is good, not straying. Fasting are 170 in AM. During day it runs 125 at times it is in 160's.  Exercise is only thing that impacts fasting. Needs Fundus                 -Microalbumin .1                 -Foot exam 22  Anxiety/depression:  sees Dr. Tillman. Missed appointment Monday. Will call to rescheduled.   Trigger finger: surgery done and still having pain/swelling/difficulty closing hand. FU with ortho    Audio Only Telehealth Visit  The patient location is: [place]  Visit type: Virtual visit with audio only (telephone)    Total time spent with patient: 15 min including time spent talking to the patient about her medication and ascertaining efficacy, reviewing old record, preparing chart for call, med reconciliation, and follow up care.   The reason for the audio only service rather than synchronous audio and video virtual visit was related to technical difficulties or patient preference/necessity.  Each patient to whom I provide medical services by telemedicine is:  (1) informed of the relationship between the physician and patient and the respective role of any other health care provider with respect to management of the patient; and (2) notified that they may decline to receive medical services by telemedicine and may withdraw from such care at any time. Patient verbally consented to receive this service via voice-only telephone call.   This service was not originating from a related E/M service provided within the previous 7  days nor will  to an E/M service or procedure within the next 24 hours or my soonest available appointment.  Prevailing standard of care was able to be met in this audio-only visit.         ________________________________________________________  12/27/22:  Follow-up 4 months.  Since we last visited, she has been told she may have had a return of her thyroid cancer after an US revealed a spot in her neck.  She saw Dr. Bryan who did a CT without contrast of neck which did not show anything.  Randi told her the increase in TSI is due to thyroid tissue being somewhere which she did not understand.  She has an appt with ENT this week to discuss thyroid levels.  Last TSH was 21.  She does feel as though her diabetes is not well managed and has had pain that is described as burning in her left great toe at times, is bruising and also has increased thirst reported. Hair loss has stopped. Is very emotional today and scared.  She has upcoming appt with Endo soon 1/19.  Increasing TSH levels despite increase in Thyroid meds.  She is to have surgery on her trigger finger 2023.  Due for some wellness labs her in primary care.  Has had hysterectomy last year.  Asthma controlled, no issues and no recent ED visits.    HLD: last FLP in july was WNL  DM: last hgbA1c 6.0 in February. Diet is good, not straying. Fasting are 170 in AM. During day it runs 125 at times it is in 160's.  Exercise is only thing that impacts fasting                 -Microalbumin july 5.1                 -Foot exam 12/27/22  Anxiety/depression:  sees Dr. Tillman. No changes to meds recently.  Had some missed appointments.  Does not feel depressed, is emotional due to confusion over thyroid issue.    Trigger finger: surgery scheduled.     8/25/22:  FU, feeling better. Not as tired and not bruising as easily as was.  Does not have any cold sores.  Once she got a couple of weeks into higher dose of synthroid blood sugars started decreasing.  Appt  with ENT 4/5 months.      8/2/22:  FU from recent labs.  TSH was up to 14.  She tells me she was taking 150mcg and they decreased to 125mcg because she started having hair loss.  This was a few months ago.  Then she started having issues with the 125mcg dose.  I increased her medication to 150 and she has been taking since we last spoke on the phone.  Repeat TSH due in September. Will reach out to Endocrine at that time if still not Euthyroid.  She is not due to see Endo for months.  I did recommend FU with ENT since she had her thyroid removed.       7/25/22:  FU. States is not healing.  Is bruising easily.  PT told her to FU with her dr.  had weird episode of losing 18 pounds in 2 weeks, was super stressed at the time but gained it all back.  Has had some swollen lymph nodes in armpit and groin but they went away.  Here for labs    2/24/22: FU  HLD: last FLP in july was WNL  DM: last hgbA1c 6.4 in November. Diet is better, not straying. Fasting are 170 in AM. During day it runs 110-120. Exercise is only thing that impacts fasting and has been unable to do that due to recent thyroid surgery.  -Microalbumin july 5.1  -Foot exam 7/21  Mild intermittent asthma: no issues with asthma in a long time. Does have allergies  Menorrhagia: resolved. hysterectomy performed last year  Takes Spirinolactone for hair loss and it is helping, prescribed by Dr. Klein  Anxiety/Depression: Wellbutrin and buspirone not effective.  Event Name  Event Result  Date/Time   GAD7 Score 14 02/24/22 12:45:00  Initial Depression Screen Score 2 02/24/22 12:43:00  Detailed Depression Screen Score 8 02/24/22 12:43:00  Total Depression Screen Score 10 02/24/22 12:43:00    Right knee pain: 2 months hx pain reported. no injury. crossfit x 6 months in past. hx weight lifting. XR in [place] told negative. still has pain/stiffness mostly throughout day. nothing makes it better but movement.   Right ring finger trigger finger: x 1 year began after  scraping paint. has had injected in past last February which helped but now having issues again. pain to base of finger.    10/19/21: Telemedicine FU 2 weeks.  This is a telehealth visit note. Patient was treated using telehealth, real time audio, video, or both according to [facility] protocols. Fely PAUL FNP-C, conducted the visit from location identified below. The patient participated in the visit at a non-[facility] location selected by the patient (or patient's representative), identified below. I am licensed in the [facility]. The patient (or patient's representative) stated that they understood and accepted the privacy and security risks to their information at their location and has consented to telephone visit.  Patient was located at patient's home.  Fely PAUL FNP-C, was located at [facility] [address].  We increased Wellbutrin to 300mg last visit and ANN was 7, we inc Buspirone to 15mg po TID. Today ANN is 5. PHQ-9 is 0.  c/o yeast infection 2 days ago started, recent abx for UTI, recurrent yeast infections. after surgery her sugars have been higher than usual. HA tried Monistat OTC 2 rounds of 7 day tx. no relief.  Event Name  Event Result  Date/Time   GAD7 Score 5 10/19/21 13:00:00  Initial Depression Screen Score 0 10/19/21 13:00:00      10/5/2021: Telemedicine follow-up-3 months.  This is a telehealth visit note. Patient was treated using telehealth, real time audio, video, or both according to [facility] protocols. Fely PAUL FNP-C, conducted the visit from location identified below. The patient participated in the visit at a non-[facility] location selected by the patient (or patient's representative), identified below. I am licensed in the [facility]. The patient (or patient's representative) stated that they understood and accepted the privacy and security risks to their information at their location and has consented to telephone visit.  Patient was  located at patient's home.  I, Fely Washington, FNP-C, was located at [facility] [address] [place] [address], [place], [place].  ER visit 9/22/21-odynophagia s/p thyroidectomy. Is feeling tightness in throat makes her feel like she is choking  August 23, 21 she had a total thyroidectomy for papillary thyroid cancer.  HLD: last FLP in july was WNL  DM: last hgbA1c 6.3 in July. Diet is better, not straying. Fasting are 170 in AM. During day it runs 110-120. Exercise is only thing that impacts fasting and has been unable to do that due to recent thyroid surgery.  microalbumin july 5.1  foot exam 7/21  Mild intermittent asthma: no issues with asthma in a long time. Does have allergies  Menorrhagia: states her periods are really heavy. states puts tampon in and it falls out. This started after last child in 2018. cramping and huge clots reported. Upcoming appt with [facility] 10/25/21.  Takes Spirinolactone for hair loss and it is helping, prescribed by Dr. Klein  c/o joint pain to entire body, used to be just hands/wrists, now it is in shoulders. Sister has lupus. She has never been worked up for it.              ALLERGIES:   Review of patient's allergies indicates:   Allergen Reactions    Adhesive      Other reaction(s): Burn    Sulfa (sulfonamide antibiotics)      Other reaction(s): Unknown    Sulfamethoxazole      Other reaction(s): Rash         ROS:  Review of Systems   Constitutional: Negative.    HENT: Negative.     Eyes: Negative.    Respiratory: Negative.     Cardiovascular: Negative.    Gastrointestinal: Negative.    Genitourinary: Negative.    Musculoskeletal: Negative.    Skin: Negative.    Neurological: Negative.    Endo/Heme/Allergies: Negative.    Psychiatric/Behavioral: Negative.     All other systems reviewed and are negative.      OBJECTIVE DATA:  Vital signs  There were no vitals filed for this visit.   There is no height or weight on file to calculate BMI.    PHYSICAL EXAM:   Physical Exam  Vitals and  nursing note reviewed.   Neurological:      Mental Status: She is alert.   Psychiatric:         Attention and Perception: Attention normal.         Mood and Affect: Mood normal.         Speech: Speech normal.         Behavior: Behavior normal. Behavior is cooperative.         Thought Content: Thought content normal.         Cognition and Memory: Cognition normal.         Judgment: Judgment normal.        ASSESSMENT/PLAN:  1. Type 2 diabetes mellitus without complication, without long-term current use of insulin  Assessment & Plan:  Continue Trulicity as directed, pt thinks it needs to be increased  Keep all appt with Dr. Ordaz  Encouraged ACE/ARB/Statin according to guidelines.  Follow ADA Diet. Avoid soda, simple sweets, and limit rice/pasta/breads/starches.  Maintain healthy weight with goal BMI <30. Exercise 5 times per week for 30 minutes per day.  Stressed importance of daily foot exams.  Stressed importance of annual dilated eye exam.  Last foot exam: July 2022  Last eye exam: July 2022  Last micro albumin: July 2022    Lab Results   Component Value Date    HGBA1C 9.6 (H) 01/18/2023     Lab Results   Component Value Date    CREATININE 0.82 01/18/2023          Orders:  -     ONETOUCH DELICA PLUS LANCET 33 gauge Misc; Apply 1 lancet topically 2 (two) times daily.  Dispense: 100 each; Refill: 11  -     ONETOUCH VERIO TEST STRIPS Strp; Apply 1 strip topically 2 (two) times daily.  Dispense: 200 each; Refill: 11    2. Hyperlipidemia, unspecified hyperlipidemia type  Overview:  Lab Results   Component Value Date    CHOL 230 (H) 01/18/2023     Lab Results   Component Value Date    HDL 39 01/18/2023     No results found for: LDLCALC  Lab Results   Component Value Date    TRIG 187 (H) 01/18/2023     No results found for: CHOLHDL;    Assessment & Plan:  Refilled Atorvastatin 10mg today.  Chronic problem, worsening  Will need FLP at next OV in 3 months  Stressed importance of dietary modifications. Follow a low  cholesterol, low saturated fat diet with less that 200mg of cholesterol a day.  Avoid fried foods and high saturated fats (high saturated fats less than 7% of calories).  Add Flax Seed/Fish Oil supplements to diet. Increase dietary fiber.  Regular exercise can reduce LDL and raise HDL. Stressed importance of physical activity 5 times per week for 30 minutes per day.           Orders:  -     atorvastatin (LIPITOR) 10 MG tablet; Take 1 tablet (10 mg total) by mouth once daily.  Dispense: 90 tablet; Refill: 3    3. Vitamin D deficiency  Assessment & Plan:  High dose vitamin D sent to pharmacy.  Educated on increasing foods high in Vitamin D such as fish oil, cod liver oil, salmon, milk fortified with vitamin D.  RX Vitamin D3 12665 IU weekly x 12 weeks.  Complete entire 12 weeks of Vitamin D prescription.  After completion of prescription (12 weeks/3 months), begin taking Vitamin D 2000 I.U. tablets daily (purchase over the counter).  Repeat Vitamin D level as ordered.      Orders:  -     cholecalciferol, vitamin D3, 1,250 mcg (50,000 unit) capsule; Take 1 capsule (50,000 Units total) by mouth every 7 days.  Dispense: 12 capsule; Refill: 0           RESULTS:  No results found for this or any previous visit (from the past 1008 hour(s)).      Follow Up:  Follow up in about 3 months (around 6/28/2023) for Diabetic FU, FU Lipid Results.             This note was created with the assistance of a voice recognition software or phone dictation. There may be transcription errors as a result of using this technology however minimal. Effort has been made to assure accuracy of transcription but any obvious errors or omissions should be clarified with the author of the document

## 2023-03-28 NOTE — ASSESSMENT & PLAN NOTE
Refilled Atorvastatin 10mg today.  Chronic problem, worsening  Will need FLP at next OV in 3 months  Stressed importance of dietary modifications. Follow a low cholesterol, low saturated fat diet with less that 200mg of cholesterol a day.  Avoid fried foods and high saturated fats (high saturated fats less than 7% of calories).  Add Flax Seed/Fish Oil supplements to diet. Increase dietary fiber.  Regular exercise can reduce LDL and raise HDL. Stressed importance of physical activity 5 times per week for 30 minutes per day.

## 2023-05-24 ENCOUNTER — LAB VISIT (OUTPATIENT)
Dept: LAB | Facility: HOSPITAL | Age: 38
End: 2023-05-24
Attending: INTERNAL MEDICINE
Payer: MEDICAID

## 2023-05-24 DIAGNOSIS — E11.9 TYPE 2 DIABETES MELLITUS WITHOUT COMPLICATION, UNSPECIFIED WHETHER LONG TERM INSULIN USE: ICD-10-CM

## 2023-05-24 DIAGNOSIS — C73 THYROID CA: ICD-10-CM

## 2023-05-24 LAB
ALBUMIN SERPL-MCNC: 4 G/DL (ref 3.5–5)
BUN SERPL-MCNC: 17.9 MG/DL (ref 7–18.7)
CALCIUM SERPL-MCNC: 9.8 MG/DL (ref 8.4–10.2)
CHLORIDE SERPL-SCNC: 104 MMOL/L (ref 98–107)
CO2 SERPL-SCNC: 23 MMOL/L (ref 22–29)
CREAT SERPL-MCNC: 0.87 MG/DL (ref 0.55–1.02)
DEPRECATED CALCIDIOL+CALCIFEROL SERPL-MC: 21.3 NG/ML (ref 30–80)
EST. AVERAGE GLUCOSE BLD GHB EST-MCNC: 137 MG/DL
GFR SERPLBLD CREATININE-BSD FMLA CKD-EPI: >60 MLS/MIN/1.73/M2
GLUCOSE SERPL-MCNC: 216 MG/DL (ref 74–100)
HBA1C MFR BLD: 6.4 %
PHOSPHATE SERPL-MCNC: 3.2 MG/DL (ref 2.3–4.7)
POTASSIUM SERPL-SCNC: 4.2 MMOL/L (ref 3.5–5.1)
PTH-INTACT SERPL-MCNC: 78.2 PG/ML (ref 8.7–77)
SODIUM SERPL-SCNC: 135 MMOL/L (ref 136–145)
TSH SERPL-ACNC: 4.11 UIU/ML (ref 0.35–4.94)

## 2023-05-24 PROCEDURE — 84443 ASSAY THYROID STIM HORMONE: CPT

## 2023-05-24 PROCEDURE — 86800 THYROGLOBULIN ANTIBODY: CPT | Mod: 90

## 2023-05-24 PROCEDURE — 83036 HEMOGLOBIN GLYCOSYLATED A1C: CPT

## 2023-05-24 PROCEDURE — 82306 VITAMIN D 25 HYDROXY: CPT

## 2023-05-24 PROCEDURE — 80069 RENAL FUNCTION PANEL: CPT

## 2023-05-24 PROCEDURE — 36415 COLL VENOUS BLD VENIPUNCTURE: CPT

## 2023-05-24 PROCEDURE — 83970 ASSAY OF PARATHORMONE: CPT

## 2023-05-25 LAB
ENDOCRINOLOGIST REVIEW: ABNORMAL
THYROGLOB AB SERPL-ACNC: <1.8 IU/ML
THYROGLOB SERPL-MCNC: 1.1 NG/ML

## 2023-05-26 ENCOUNTER — OFFICE VISIT (OUTPATIENT)
Dept: ENDOCRINOLOGY | Facility: CLINIC | Age: 38
End: 2023-05-26
Payer: MEDICAID

## 2023-05-26 VITALS
TEMPERATURE: 98 F | HEART RATE: 82 BPM | HEIGHT: 64 IN | DIASTOLIC BLOOD PRESSURE: 68 MMHG | RESPIRATION RATE: 16 BRPM | WEIGHT: 197.19 LBS | BODY MASS INDEX: 33.66 KG/M2 | SYSTOLIC BLOOD PRESSURE: 111 MMHG

## 2023-05-26 DIAGNOSIS — E89.0 POSTSURGICAL HYPOTHYROIDISM: ICD-10-CM

## 2023-05-26 DIAGNOSIS — E11.9 TYPE 2 DIABETES MELLITUS WITHOUT COMPLICATION, WITHOUT LONG-TERM CURRENT USE OF INSULIN: ICD-10-CM

## 2023-05-26 DIAGNOSIS — C73 THYROID CANCER: Primary | ICD-10-CM

## 2023-05-26 DIAGNOSIS — E55.9 VITAMIN D DEFICIENCY: ICD-10-CM

## 2023-05-26 PROCEDURE — 99214 OFFICE O/P EST MOD 30 MIN: CPT | Mod: PBBFAC

## 2023-05-26 RX ORDER — LINAGLIPTIN 5 MG/1
5 TABLET, FILM COATED ORAL DAILY
Qty: 90 TABLET | Refills: 3 | Status: SHIPPED | OUTPATIENT
Start: 2023-05-26 | End: 2023-06-28

## 2023-05-26 RX ORDER — ASPIRIN 325 MG
50000 TABLET, DELAYED RELEASE (ENTERIC COATED) ORAL
Qty: 8 CAPSULE | Refills: 1 | Status: SHIPPED | OUTPATIENT
Start: 2023-05-26

## 2023-05-26 RX ORDER — LEVOTHYROXINE SODIUM 112 UG/1
112 TABLET ORAL 2 TIMES DAILY
Qty: 180 TABLET | Refills: 3 | Status: SHIPPED | OUTPATIENT
Start: 2023-05-26 | End: 2023-12-11 | Stop reason: SDUPTHER

## 2023-05-26 RX ORDER — LINAGLIPTIN 5 MG/1
TABLET, FILM COATED ORAL
COMMUNITY
Start: 2023-04-10 | End: 2023-05-26 | Stop reason: SDUPTHER

## 2023-05-26 NOTE — PROGRESS NOTES
"[facility] Visit    Chief Complaint:      Thyroid Cancer and Diabetes     Subjective:     HPI:  Kinza Ford is a 37 y.o. female who presents to Endocrinology clinic today for Thyroid Cancer and Diabetes.  Patient reports a history of thyroid cancer diagnosed in July 2021 and had a thyroidectomy in August 2021.  Patient referred to endocrinology clinic by ENT for further evaluation of her thyroid cancer surveillance.  Patient had an elevated globulin tumor marker level of 4.8 in November 2022.  She had repeat ultrasound of the neck showing mild scarring at site of thyroidectomy.  CT of the neck showed no pathology to me bed, no acute pathology disease at the neck.      Today: pt feeling a lot better with her medication changes. Denies any weight gain. Fatigue , diarrhea, polyuria, polydipsia at this time. Complaint with all her meds. Stated Trulicity makes her burp that stinks and also causes diarrhea but otherwise no side effects to pioglitazone that she was recently started.    Review of Systems  Review of Systems   Constitutional:  Negative for chills, diaphoresis, fever, malaise/fatigue and weight loss.   Respiratory:  Negative for cough, hemoptysis, sputum production, shortness of breath and wheezing.    Cardiovascular:  Negative for chest pain, palpitations, orthopnea, claudication, leg swelling and PND.   Gastrointestinal:  Negative for abdominal pain, blood in stool, constipation, diarrhea, heartburn, melena, nausea and vomiting.   Genitourinary:  Negative for dysuria, flank pain, frequency, hematuria and urgency.   Skin:  Negative for itching and rash.   Neurological:  Negative for focal weakness and headaches.     Objective:   Last 24 Hour Vital Signs:  Vitals  BP: 111/68  Temp: 97.8 °F (36.6 °C)  Temp Source: Oral  Pulse: 82  Resp: 16  Height: 5' 4" (162.6 cm)  Weight: 89.4 kg (197 lb 3.2 oz)    Physical Examination:  General: well developed; pleasant and cooperative  HEENT: " Normocephalic, atraumatic. Face symmetric.  Cardiovascular: regular rate, well perfused  Pulmonary: Bilateral symmetric chest rise. Non-labored  Skin:  Exposed skin is warm & dry.  Extremities: No clubbing, cyanosis or edema.  Neuro:   Patient moves all extremities equally. No signs of peripheral neurological deficit      Assessment & Plan:     Hx of stage 1 papillary thyroid cancer s/p thyroidectomy   Subsequent hypothyroidism    - thyroidectomy in August 2021   - Repat US 11/21/22 revealing post total thyroidectomy with probable     mild scarring at the left hemithyroidectomy bed.  - CT Soft tissue neck 12/1/2022: Post thyroidectomy with no pathology identified at the thyroidectomy bed.  No acute pathology or metastatic disease identified at the neck. No significant interval change compared to the neck CT with contrast 09/22/2021.  - TSH of  4.1 from 5.37 5/23  - thyroglobulin tumor marker of 1.1 from 4.8 on 5/23  - thyroglobulin Ab of <1.8 on 5/23  - will increase LT4 from 200 mcg to  224mcg (Take 2 tabs of 112 daily )  Will repeat TSH at next visit   DMT2    - A1c of 6.4 from  9.6 on 5/23   - Continue metformin 1000mg BID   - stop trulicity 0.75 mg/wk due to SE   - continue pioglitazone 15 mg every day              - Continue faxiga 10 daily              - started on tradjenta 5 mg daily               - Repeat A1c at next visit     Vit D deficiency    - Vit D of 21.3 from 17.2 on 5/23   - Only took Vit D 50,000 for 4 weeks              - Refilled medications              - Check vitamin d at next visit     Follow-ups  -Follow-up in 3 months with labs     Shemar Shaw DO  [facility] PGY-1

## 2023-06-04 ENCOUNTER — OFFICE VISIT (OUTPATIENT)
Dept: URGENT CARE | Facility: CLINIC | Age: 38
End: 2023-06-04
Payer: MEDICAID

## 2023-06-04 VITALS
TEMPERATURE: 99 F | WEIGHT: 200.38 LBS | DIASTOLIC BLOOD PRESSURE: 71 MMHG | RESPIRATION RATE: 20 BRPM | SYSTOLIC BLOOD PRESSURE: 101 MMHG | HEIGHT: 65 IN | HEART RATE: 83 BPM | OXYGEN SATURATION: 99 % | BODY MASS INDEX: 33.38 KG/M2

## 2023-06-04 DIAGNOSIS — R05.9 COUGH, UNSPECIFIED TYPE: ICD-10-CM

## 2023-06-04 DIAGNOSIS — J02.9 SORE THROAT: ICD-10-CM

## 2023-06-04 DIAGNOSIS — J30.9 ALLERGIC RHINITIS, UNSPECIFIED SEASONALITY, UNSPECIFIED TRIGGER: ICD-10-CM

## 2023-06-04 DIAGNOSIS — J01.90 ACUTE NON-RECURRENT SINUSITIS, UNSPECIFIED LOCATION: Primary | ICD-10-CM

## 2023-06-04 LAB
CTP QC/QA: YES
MOLECULAR STREP A: NEGATIVE
POC MOLECULAR INFLUENZA A AGN: NEGATIVE
POC MOLECULAR INFLUENZA B AGN: NEGATIVE
SARS-COV-2 RDRP RESP QL NAA+PROBE: NEGATIVE

## 2023-06-04 PROCEDURE — 99215 OFFICE O/P EST HI 40 MIN: CPT | Mod: PBBFAC | Performed by: NURSE PRACTITIONER

## 2023-06-04 PROCEDURE — 87502 INFLUENZA DNA AMP PROBE: CPT | Mod: PBBFAC | Performed by: NURSE PRACTITIONER

## 2023-06-04 PROCEDURE — 99214 PR OFFICE/OUTPT VISIT, EST, LEVL IV, 30-39 MIN: ICD-10-PCS | Mod: S$PBB,,, | Performed by: NURSE PRACTITIONER

## 2023-06-04 PROCEDURE — 87635 SARS-COV-2 COVID-19 AMP PRB: CPT | Mod: PBBFAC | Performed by: NURSE PRACTITIONER

## 2023-06-04 PROCEDURE — 99214 OFFICE O/P EST MOD 30 MIN: CPT | Mod: S$PBB,,, | Performed by: NURSE PRACTITIONER

## 2023-06-04 PROCEDURE — 87651 STREP A DNA AMP PROBE: CPT | Mod: PBBFAC | Performed by: NURSE PRACTITIONER

## 2023-06-04 RX ORDER — METHYLPREDNISOLONE 4 MG/1
TABLET ORAL
Qty: 21 TABLET | Refills: 0 | Status: SHIPPED | OUTPATIENT
Start: 2023-06-04 | End: 2023-06-28

## 2023-06-04 RX ORDER — AMOXICILLIN 875 MG/1
875 TABLET, FILM COATED ORAL 2 TIMES DAILY
Qty: 20 TABLET | Refills: 0 | Status: SHIPPED | OUTPATIENT
Start: 2023-06-04 | End: 2023-06-28

## 2023-06-04 NOTE — PROGRESS NOTES
"Subjective:       Patient ID: Kinza Ford is a 37 y.o. female.    Vitals:  height is 5' 4.57" (1.64 m) and weight is 90.9 kg (200 lb 6.4 oz). Her temperature is 98.7 °F (37.1 °C). Her blood pressure is 101/71 and her pulse is 83. Her respiration is 20 and oxygen saturation is 99%.     Chief Complaint: Nasal Congestion (Sinus infection post nasal drip and cough - Entered by patient), Cough, and Sore Throat    Patient states she does have a history of seasonal allergies, does see an allergist and takes daily antihistamine.  States symptoms got worse over the past few days.      Constitution: Negative.   HENT:  Positive for congestion and sinus pain.    Cardiovascular: Negative.    Respiratory: Negative.       Objective:      Physical Exam   Constitutional: She is oriented to person, place, and time. She appears well-developed.   HENT:   Head: Normocephalic.   Ears:   Right Ear: Tympanic membrane normal.   Left Ear: Tympanic membrane normal.   Nose: Congestion present. Right sinus exhibits maxillary sinus tenderness. Left sinus exhibits maxillary sinus tenderness.   Eyes: Conjunctivae and EOM are normal. Pupils are equal, round, and reactive to light.   Neck: Neck supple.   Cardiovascular: Normal rate, regular rhythm and normal heart sounds.   Pulmonary/Chest: Effort normal and breath sounds normal.   Abdominal: Bowel sounds are normal. Soft.   Musculoskeletal: Normal range of motion.         General: Normal range of motion.   Neurological: She is alert and oriented to person, place, and time.   Skin: Skin is warm and dry. Capillary refill takes less than 2 seconds.   Psychiatric: Her behavior is normal.   Vitals reviewed.      Assessment:       1. Acute non-recurrent sinusitis, unspecified location    2. Cough, unspecified type    3. Sore throat    4. Allergic rhinitis, unspecified seasonality, unspecified trigger            Office Visit on 06/04/2023   Component Date Value Ref Range Status    POC Rapid " COVID 06/04/2023 Negative  Negative Final     Acceptable 06/04/2023 Yes   Final    POC Molecular Influenza A Ag 06/04/2023 Negative  Negative, Not Reported Final    POC Molecular Influenza B Ag 06/04/2023 Negative  Negative, Not Reported Final     Acceptable 06/04/2023 Yes   Final    Molecular Strep A, POC 06/04/2023 Negative  Negative Final     Acceptable 06/04/2023 Yes   Final        No results found.   Plan:         Medication as ordered. May use humidifier. If no improvement in symptoms after medication complete then rtc.       Acute non-recurrent sinusitis, unspecified location  -     methylPREDNISolone (MEDROL DOSEPACK) 4 mg tablet; Take 6 tablets (24 mg total) by mouth once daily for 1 day, THEN 5 tablets (20 mg total) once daily for 1 day, THEN 4 tablets (16 mg total) once daily for 1 day, THEN 3 tablets (12 mg total) once daily for 1 day, THEN 2 tablets (8 mg total) once daily for 1 day, THEN 1 tablet (4 mg total) once daily for 1 day. use as directed.  Dispense: 21 tablet; Refill: 0  -     amoxicillin (AMOXIL) 875 MG tablet; Take 1 tablet (875 mg total) by mouth 2 (two) times daily. for 10 days  Dispense: 20 tablet; Refill: 0    Cough, unspecified type  -     POCT COVID-19 Rapid Screening  -     POCT Influenza A/B Molecular    Sore throat  -     POCT Strep A, Molecular    Allergic rhinitis, unspecified seasonality, unspecified trigger  -     methylPREDNISolone (MEDROL DOSEPACK) 4 mg tablet; Take 6 tablets (24 mg total) by mouth once daily for 1 day, THEN 5 tablets (20 mg total) once daily for 1 day, THEN 4 tablets (16 mg total) once daily for 1 day, THEN 3 tablets (12 mg total) once daily for 1 day, THEN 2 tablets (8 mg total) once daily for 1 day, THEN 1 tablet (4 mg total) once daily for 1 day. use as directed.  Dispense: 21 tablet; Refill: 0

## 2023-06-15 DIAGNOSIS — E11.9 TYPE 2 DIABETES MELLITUS WITHOUT COMPLICATION, WITHOUT LONG-TERM CURRENT USE OF INSULIN: ICD-10-CM

## 2023-06-15 RX ORDER — DAPAGLIFLOZIN 10 MG/1
10 TABLET, FILM COATED ORAL DAILY
Qty: 30 TABLET | Refills: 3 | Status: SHIPPED | OUTPATIENT
Start: 2023-06-15 | End: 2023-06-28

## 2023-06-15 NOTE — TELEPHONE ENCOUNTER
Pharmacy is sending a request for refill, is she still taking supposed to still be taking this? Last ordered in January.

## 2023-06-26 ENCOUNTER — DOCUMENTATION ONLY (OUTPATIENT)
Dept: ADMINISTRATIVE | Facility: HOSPITAL | Age: 38
End: 2023-06-26
Payer: MEDICAID

## 2023-06-28 ENCOUNTER — OFFICE VISIT (OUTPATIENT)
Dept: FAMILY MEDICINE | Facility: CLINIC | Age: 38
End: 2023-06-28
Payer: MEDICAID

## 2023-06-28 VITALS
SYSTOLIC BLOOD PRESSURE: 98 MMHG | DIASTOLIC BLOOD PRESSURE: 64 MMHG | RESPIRATION RATE: 20 BRPM | OXYGEN SATURATION: 99 % | WEIGHT: 198.38 LBS | HEIGHT: 64 IN | BODY MASS INDEX: 33.87 KG/M2 | HEART RATE: 71 BPM | TEMPERATURE: 98 F

## 2023-06-28 DIAGNOSIS — E78.2 MIXED HYPERLIPIDEMIA: ICD-10-CM

## 2023-06-28 DIAGNOSIS — M25.542 ARTHRALGIA OF LEFT HAND: ICD-10-CM

## 2023-06-28 DIAGNOSIS — E11.9 TYPE 2 DIABETES MELLITUS WITHOUT COMPLICATION, WITHOUT LONG-TERM CURRENT USE OF INSULIN: Primary | ICD-10-CM

## 2023-06-28 LAB
CREAT UR-MCNC: 147.5 MG/DL (ref 47–110)
HBA1C MFR BLD: 7.2 %
MICROALBUMIN UR-MCNC: 8.5 UG/ML
MICROALBUMIN/CREAT RATIO PNL UR: 5.8 MG/GM CR (ref 0–30)

## 2023-06-28 PROCEDURE — 99213 OFFICE O/P EST LOW 20 MIN: CPT | Mod: PBBFAC,PN | Performed by: NURSE PRACTITIONER

## 2023-06-28 PROCEDURE — 3008F BODY MASS INDEX DOCD: CPT | Mod: CPTII,,, | Performed by: NURSE PRACTITIONER

## 2023-06-28 PROCEDURE — 1159F PR MEDICATION LIST DOCUMENTED IN MEDICAL RECORD: ICD-10-PCS | Mod: CPTII,,, | Performed by: NURSE PRACTITIONER

## 2023-06-28 PROCEDURE — 3066F NEPHROPATHY DOC TX: CPT | Mod: CPTII,,, | Performed by: NURSE PRACTITIONER

## 2023-06-28 PROCEDURE — 3074F PR MOST RECENT SYSTOLIC BLOOD PRESSURE < 130 MM HG: ICD-10-PCS | Mod: CPTII,,, | Performed by: NURSE PRACTITIONER

## 2023-06-28 PROCEDURE — 1160F RVW MEDS BY RX/DR IN RCRD: CPT | Mod: CPTII,,, | Performed by: NURSE PRACTITIONER

## 2023-06-28 PROCEDURE — 3078F DIAST BP <80 MM HG: CPT | Mod: CPTII,,, | Performed by: NURSE PRACTITIONER

## 2023-06-28 PROCEDURE — 3078F PR MOST RECENT DIASTOLIC BLOOD PRESSURE < 80 MM HG: ICD-10-PCS | Mod: CPTII,,, | Performed by: NURSE PRACTITIONER

## 2023-06-28 PROCEDURE — 3008F PR BODY MASS INDEX (BMI) DOCUMENTED: ICD-10-PCS | Mod: CPTII,,, | Performed by: NURSE PRACTITIONER

## 2023-06-28 PROCEDURE — 3061F PR NEG MICROALBUMINURIA RESULT DOCUMENTED/REVIEW: ICD-10-PCS | Mod: CPTII,,, | Performed by: NURSE PRACTITIONER

## 2023-06-28 PROCEDURE — 83036 HEMOGLOBIN GLYCOSYLATED A1C: CPT | Mod: PBBFAC,PN | Performed by: NURSE PRACTITIONER

## 2023-06-28 PROCEDURE — 1159F MED LIST DOCD IN RCRD: CPT | Mod: CPTII,,, | Performed by: NURSE PRACTITIONER

## 2023-06-28 PROCEDURE — 3061F NEG MICROALBUMINURIA REV: CPT | Mod: CPTII,,, | Performed by: NURSE PRACTITIONER

## 2023-06-28 PROCEDURE — 99214 PR OFFICE/OUTPT VISIT, EST, LEVL IV, 30-39 MIN: ICD-10-PCS | Mod: S$PBB,,, | Performed by: NURSE PRACTITIONER

## 2023-06-28 PROCEDURE — 3074F SYST BP LT 130 MM HG: CPT | Mod: CPTII,,, | Performed by: NURSE PRACTITIONER

## 2023-06-28 PROCEDURE — 1160F PR REVIEW ALL MEDS BY PRESCRIBER/CLIN PHARMACIST DOCUMENTED: ICD-10-PCS | Mod: CPTII,,, | Performed by: NURSE PRACTITIONER

## 2023-06-28 PROCEDURE — 3066F PR DOCUMENTATION OF TREATMENT FOR NEPHROPATHY: ICD-10-PCS | Mod: CPTII,,, | Performed by: NURSE PRACTITIONER

## 2023-06-28 PROCEDURE — 99214 OFFICE O/P EST MOD 30 MIN: CPT | Mod: S$PBB,,, | Performed by: NURSE PRACTITIONER

## 2023-06-28 PROCEDURE — 82043 UR ALBUMIN QUANTITATIVE: CPT | Performed by: NURSE PRACTITIONER

## 2023-06-28 NOTE — ASSESSMENT & PLAN NOTE
Chronic, stable  Stopped lipitor  Stressed importance of dietary modifications. Follow a low cholesterol, low saturated fat diet with less that 200mg of cholesterol a day.  Avoid fried foods and high saturated fats (high saturated fats less than 7% of calories).  Add Flax Seed/Fish Oil supplements to diet. Increase dietary fiber.  Regular exercise can reduce LDL and raise HDL. Stressed importance of physical activity 5 times per week for 30 minutes per day.

## 2023-06-28 NOTE — PROGRESS NOTES
"Uric aPatient Name: Kinza Ford   : 1985  MRN: [id number]     SUBJECTIVE DATA:    CHIEF COMPLAINT:   Kinza Ford is a 37 y.o. female who presents to clinic today with Follow-up (3 month f/u for DM2 , HLD)        HPI:  23:  3 month f/u for HLD.  Sees Endocrine for her thyroid issue and diabetes.  Pt stopped taking all of her medications weeks ago due to not feeling well, diarrhea, fatigue, nausea/vomiting.    PAP-Hysterectomy due to Fibroids, last one 5 years ago  MMG-hasnt had one yet    FLP in 23 11:23  Cholesterol: 230 (H); HDL: 39; LDL Cholesterol External: 154.00 (H); Total Cholesterol/HDL Ratio: 6 (H); Triglycerides: 187 (H)  Very Low Density Lipoprotein: 37              ALLERGIES:   Review of patient's allergies indicates:   Allergen Reactions    Adhesive      Other reaction(s): Burn    Sulfa (sulfonamide antibiotics)      Other reaction(s): Unknown    Sulfamethoxazole      Other reaction(s): Rash         ROS:  Review of Systems   Constitutional: Negative.    HENT: Negative.     Eyes: Negative.    Respiratory: Negative.     Cardiovascular: Negative.    Gastrointestinal: Negative.    Genitourinary: Negative.    Musculoskeletal: Negative.    Skin: Negative.    Neurological: Negative.    Endo/Heme/Allergies: Negative.    Psychiatric/Behavioral: Negative.     All other systems reviewed and are negative.      OBJECTIVE DATA:  Vital signs  Vitals:    23 1242   BP: 98/64   BP Location: Right arm   Patient Position: Sitting   BP Method: Large (Automatic)   Pulse: 71   Resp: 20   Temp: 98.4 °F (36.9 °C)   TempSrc: Oral   SpO2: 99%   Weight: 90 kg (198 lb 6.4 oz)   Height: 5' 4" (1.626 m)      Body mass index is 34.06 kg/m².    PHYSICAL EXAM:   Physical Exam  Vitals and nursing note reviewed.   HENT:      Head: Normocephalic and atraumatic.   Cardiovascular:      Rate and Rhythm: Normal rate and regular rhythm.      Pulses: Normal pulses.      Heart sounds: Normal " heart sounds.   Pulmonary:      Breath sounds: Normal breath sounds.   Musculoskeletal:         General: Normal range of motion.      Cervical back: Normal range of motion.   Skin:     General: Skin is warm and dry.   Neurological:      General: No focal deficit present.      Mental Status: She is alert and oriented to person, place, and time.   Psychiatric:         Mood and Affect: Mood normal.        ASSESSMENT/PLAN:  1. Type 2 diabetes mellitus without complication, without long-term current use of insulin  -     POCT HEMOGLOBIN A1C  -     Diabetic Eye Screening Photo  -     Microalbumin/Creatinine Ratio, Urine; Future; Expected date: 06/28/2023    2. Mixed hyperlipidemia  Overview:  Lab Results   Component Value Date    CHOL 230 (H) 01/18/2023     Lab Results   Component Value Date    HDL 39 01/18/2023     No results found for: LDLCALC  Lab Results   Component Value Date    TRIG 187 (H) 01/18/2023     No results found for: CHOLHDL;    Orders:  -     Lipid Panel; Future; Expected date: 06/28/2023    3. Arthralgia of left hand  -     Uric Acid; Future; Expected date: 06/28/2023           RESULTS:  Recent Results (from the past 1008 hour(s))   TSH    Collection Time: 05/24/23 12:52 PM   Result Value Ref Range    Thyroid Stimulating Hormone 4.106 0.350 - 4.940 uIU/mL   Thyroglobulin    Collection Time: 05/24/23 12:52 PM   Result Value Ref Range    Thyroglobulin Antibody, S <1.8 <1.8 IU/mL    Thyroglobulin, Tumor Marker, S 1.1 (H) ng/mL    Thyroglobulin Interpretation SEE COMMENTS    Vitamin D    Collection Time: 05/24/23 12:52 PM   Result Value Ref Range    Vit D 25 OH 21.3 (L) 30.0 - 80.0 ng/mL   PTH, Intact    Collection Time: 05/24/23 12:52 PM   Result Value Ref Range    Parathyroid Hormone Intact 78.2 (H) 8.7 - 77.0 pg/mL   Renal Function Panel    Collection Time: 05/24/23 12:52 PM   Result Value Ref Range    Sodium Level 135 (L) 136 - 145 mmol/L    Potassium Level 4.2 3.5 - 5.1 mmol/L    Chloride 104 98 - 107  mmol/L    Carbon Dioxide 23 22 - 29 mmol/L    Glucose Level 216 (H) 74 - 100 mg/dL    Blood Urea Nitrogen 17.9 7.0 - 18.7 mg/dL    Creatinine 0.87 0.55 - 1.02 mg/dL    Calcium Level Total 9.8 8.4 - 10.2 mg/dL    Albumin Level 4.0 3.5 - 5.0 g/dL    Phosphorus Level 3.2 2.3 - 4.7 mg/dL    eGFR >60 mls/min/1.73/m2   Hemoglobin A1C    Collection Time: 05/24/23 12:52 PM   Result Value Ref Range    Hemoglobin A1c 6.4 <=7.0 %    Estimated Average Glucose 137.0 mg/dL   POCT COVID-19 Rapid Screening    Collection Time: 06/04/23  1:04 PM   Result Value Ref Range    POC Rapid COVID Negative Negative     Acceptable Yes    POCT Strep A, Molecular    Collection Time: 06/04/23  1:04 PM   Result Value Ref Range    Molecular Strep A, POC Negative Negative     Acceptable Yes    POCT Influenza A/B Molecular    Collection Time: 06/04/23  1:11 PM   Result Value Ref Range    POC Molecular Influenza A Ag Negative Negative, Not Reported    POC Molecular Influenza B Ag Negative Negative, Not Reported     Acceptable Yes          Follow Up:  Follow up in about 4 months (around 10/28/2023) for HLD.             This note was created with the assistance of a voice recognition software or phone dictation. There may be transcription errors as a result of using this technology however minimal. Effort has been made to assure accuracy of transcription but any obvious errors or omissions should be clarified with the author of the document

## 2023-06-28 NOTE — ASSESSMENT & PLAN NOTE
Encouraged ACE/ARB/Statin according to guidelines.  Follow ADA Diet. Avoid soda, simple sweets, and limit rice/pasta/breads/starches.  Maintain healthy weight with goal BMI <30. Exercise 5 times per week for 30 minutes per day.  Stressed importance of daily foot exams.  Stressed importance of annual dilated eye exam.  Last foot exam: 6/28/23  Last eye exam: 6/28/23  Last micro albumin: 6/28/23

## 2023-07-06 ENCOUNTER — PATIENT MESSAGE (OUTPATIENT)
Dept: FAMILY MEDICINE | Facility: CLINIC | Age: 38
End: 2023-07-06
Payer: MEDICAID

## 2023-07-06 ENCOUNTER — LAB VISIT (OUTPATIENT)
Dept: LAB | Facility: HOSPITAL | Age: 38
End: 2023-07-06
Attending: NURSE PRACTITIONER
Payer: MEDICAID

## 2023-07-06 DIAGNOSIS — E78.2 MIXED HYPERLIPIDEMIA: ICD-10-CM

## 2023-07-06 DIAGNOSIS — M25.542 ARTHRALGIA OF LEFT HAND: ICD-10-CM

## 2023-07-06 LAB
CHOLEST SERPL-MCNC: 199 MG/DL
CHOLEST/HDLC SERPL: 5 {RATIO} (ref 0–5)
HDLC SERPL-MCNC: 43 MG/DL (ref 35–60)
LDLC SERPL CALC-MCNC: 136 MG/DL (ref 50–140)
TRIGL SERPL-MCNC: 101 MG/DL (ref 37–140)
URATE SERPL-MCNC: 5.8 MG/DL (ref 2.6–6)
VLDLC SERPL CALC-MCNC: 20 MG/DL

## 2023-07-06 PROCEDURE — 84550 ASSAY OF BLOOD/URIC ACID: CPT

## 2023-07-06 PROCEDURE — 80061 LIPID PANEL: CPT

## 2023-07-06 PROCEDURE — 36415 COLL VENOUS BLD VENIPUNCTURE: CPT

## 2023-07-06 NOTE — TELEPHONE ENCOUNTER
Uric acid level is below 6 which is right where we want it to prevent gout attacks.  Also, your cholesterol levels have greatly improved from last time we adonay them.  Please follow-up with Dr. Ordaz regarding your A1c which was higher off of meds.

## 2023-07-26 ENCOUNTER — OFFICE VISIT (OUTPATIENT)
Dept: URGENT CARE | Facility: CLINIC | Age: 38
End: 2023-07-26
Payer: MEDICAID

## 2023-07-26 VITALS
RESPIRATION RATE: 20 BRPM | BODY MASS INDEX: 33.46 KG/M2 | WEIGHT: 196 LBS | HEART RATE: 86 BPM | SYSTOLIC BLOOD PRESSURE: 109 MMHG | HEIGHT: 64 IN | OXYGEN SATURATION: 99 % | DIASTOLIC BLOOD PRESSURE: 78 MMHG | TEMPERATURE: 98 F

## 2023-07-26 DIAGNOSIS — J01.40 ACUTE NON-RECURRENT PANSINUSITIS: ICD-10-CM

## 2023-07-26 DIAGNOSIS — R09.81 NASAL CONGESTION: Primary | ICD-10-CM

## 2023-07-26 DIAGNOSIS — H01.004 BLEPHARITIS OF LEFT UPPER EYELID, UNSPECIFIED TYPE: ICD-10-CM

## 2023-07-26 LAB
CTP QC/QA: YES
CTP QC/QA: YES
POC MOLECULAR INFLUENZA A AGN: NEGATIVE
POC MOLECULAR INFLUENZA B AGN: NEGATIVE
SARS-COV-2 RDRP RESP QL NAA+PROBE: NEGATIVE

## 2023-07-26 PROCEDURE — 87635 SARS-COV-2 COVID-19 AMP PRB: CPT | Mod: PBBFAC | Performed by: NURSE PRACTITIONER

## 2023-07-26 PROCEDURE — 99214 OFFICE O/P EST MOD 30 MIN: CPT | Mod: PBBFAC | Performed by: NURSE PRACTITIONER

## 2023-07-26 PROCEDURE — 99213 OFFICE O/P EST LOW 20 MIN: CPT | Mod: S$PBB,,, | Performed by: NURSE PRACTITIONER

## 2023-07-26 PROCEDURE — 87502 INFLUENZA DNA AMP PROBE: CPT | Mod: PBBFAC | Performed by: NURSE PRACTITIONER

## 2023-07-26 PROCEDURE — 99213 PR OFFICE/OUTPT VISIT, EST, LEVL III, 20-29 MIN: ICD-10-PCS | Mod: S$PBB,,, | Performed by: NURSE PRACTITIONER

## 2023-07-26 RX ORDER — PREDNISONE 10 MG/1
30 TABLET ORAL DAILY
Qty: 15 TABLET | Refills: 0 | Status: SHIPPED | OUTPATIENT
Start: 2023-07-26 | End: 2023-07-31

## 2023-07-26 RX ORDER — ERYTHROMYCIN 5 MG/G
OINTMENT OPHTHALMIC 3 TIMES DAILY
Qty: 3.5 G | Refills: 0 | Status: SHIPPED | OUTPATIENT
Start: 2023-07-26 | End: 2023-07-31

## 2023-07-26 RX ORDER — LEVOTHYROXINE SODIUM 200 UG/1
200 TABLET ORAL
COMMUNITY
End: 2023-09-08

## 2023-07-26 RX ORDER — AMOXICILLIN AND CLAVULANATE POTASSIUM 875; 125 MG/1; MG/1
1 TABLET, FILM COATED ORAL EVERY 12 HOURS
Qty: 20 TABLET | Refills: 0 | Status: SHIPPED | OUTPATIENT
Start: 2023-07-26 | End: 2023-08-05

## 2023-07-26 NOTE — PROGRESS NOTES
Previous History      Review of patient's allergies indicates:   Allergen Reactions    Adhesive      Other reaction(s): Burn    Sulfa (sulfonamide antibiotics)      Other reaction(s): Unknown    Sulfamethoxazole      Other reaction(s): Rash       Past Medical History:   Diagnosis Date    Acquired hypothyroidism     Allergy     DM2 (diabetes mellitus, type 2)     Hx of papillary thyroid carcinoma     IBS (irritable bowel syndrome)     Migraines     Mixed hyperlipidemia     Uncontrolled type 2 diabetes mellitus with hyperglycemia 2023     Current Outpatient Medications   Medication Instructions    albuterol (PROVENTIL/VENTOLIN HFA) 90 mcg/actuation inhaler 1 puff, Every 6 hours PRN, As needed for wheezing.     amoxicillin-clavulanate 875-125mg (AUGMENTIN) 875-125 mg per tablet 1 tablet, Oral, Every 12 hours    azelastine (ASTELIN) 137 mcg, Nasal, 2 times daily    cetirizine (ZYRTEC) 10 MG tablet one tablet    cholecalciferol (vitamin D3) 50,000 Units, Oral, Every 7 days    EPINEPHrine (EPIPEN) 0.3 mg/0.3 mL AtIn SMARTSI Pre-Filled Pen Syringe IM Once    erythromycin (ROMYCIN) ophthalmic ointment Left Eye, 3 times daily    levothyroxine (SYNTHROID) 112 mcg, Oral, 2 times daily    levothyroxine (SYNTHROID) 200 mcg, Oral, Before breakfast    predniSONE (DELTASONE) 30 mg, Oral, Daily     Past Surgical History:   Procedure Laterality Date    ADENOIDECTOMY  2014     SECTION      COLONOSCOPY      ESOPHAGOGASTRODUODENOSCOPY      HYSTERECTOMY  2021    HYSTERECTOMY, TOTAL, VAGINAL, WITH CYSTOSCOPY  2021    MODIFIED [facility] CULDOPLASTY    THYROIDECTOMY  2021    TOTAL 2/2 PAPILLARY THRYROID CARCINOMA    TONSILLECTOMY  2008    ADENOIDECTOMY    TRIGGER FINGER RELEASE Right 2023    Procedure: RELEASE, TRIGGER FINGER, RING;  Surgeon: Hang Casas MD;  Location: [facility];  Service: Plastics;  Laterality: Right;    TUBAL LIGATION  2018    URETERAL STENT PLACEMENT Right  "02/27/2008    WISDOM TOOTH EXTRACTION       Family History   Problem Relation Age of Onset    No Known Problems Mother     Crohn's disease Father     Diabetes Mellitus Father     Diabetes Father     Cancer Paternal Uncle     Diabetes Maternal Aunt        Social History     Tobacco Use    Smoking status: Never    Smokeless tobacco: Never   Substance Use Topics    Alcohol use: Yes     Alcohol/week: 2.0 standard drinks     Types: 2 Shots of liquor per week     Comment: socially    Drug use: Never        Physical Exam      Vital Signs Reviewed   /78   Pulse 86   Temp 98.1 °F (36.7 °C) (Oral)   Resp 20   Ht 5' 4" (1.626 m)   Wt 88.9 kg (196 lb)   SpO2 99%   BMI 33.64 kg/m²        Procedures    Procedures     Labs     Results for orders placed or performed in visit on 07/26/23   POCT COVID-19 Rapid Screening   Result Value Ref Range    POC Rapid COVID Negative Negative     Acceptable Yes    POCT Influenza A/B MOLECULAR   Result Value Ref Range    POC Molecular Influenza A Ag Negative Negative, Not Reported    POC Molecular Influenza B Ag Negative Negative, Not Reported     Acceptable Yes    Subjective:      Patient ID: Kinza Ford is a 37 y.o. female.    Vitals:  height is 5' 4" (1.626 m) and weight is 88.9 kg (196 lb). Her oral temperature is 98.1 °F (36.7 °C). Her blood pressure is 109/78 and her pulse is 86. Her respiration is 20 and oxygen saturation is 99%.     Chief Complaint: Eye Problem (Left eye pain. Patient has swelling to the top eye lid.) and Sinus Problem (Nasal congestion, sinus pressure, headache x 3 days./)    37-year-old white female presents to clinic complaining of facial pain, eye pressure, and headache times 3 days.  Patient states she just returned from a trip to [place] possibly exposed while flying to COVID/flu.  Patient denies any fever, chills, or body aches. Patient also c/o of left "eyelid infection" times 7 days. Patient states that " she was a small yellow discharge from her left upper eyelashes.  Patient denies any visual disturbances or photophobia.    Eye Problem   Associated symptoms include an eye discharge. Pertinent negatives include no blurred vision, double vision or photophobia.   Sinus Problem  Associated symptoms include headaches and sinus pressure.   Constitution: Positive for fatigue.   HENT:  Positive for sinus pain and sinus pressure.    Neck: Positive for painful lymph nodes.   Cardiovascular: Negative.    Eyes:  Positive for eye discharge. Negative for eye trauma, eye pain, photophobia, vision loss, double vision and blurred vision.   Respiratory: Negative.     Gastrointestinal: Negative.    Endocrine: negative.   Genitourinary: Negative.    Musculoskeletal: Negative.    Skin: Negative.    Allergic/Immunologic: Negative.    Neurological:  Positive for headaches.   Hematologic/Lymphatic: Positive for swollen lymph nodes.   Psychiatric/Behavioral: Negative.      Objective:     Physical Exam   Constitutional: She is oriented to person, place, and time. She appears well-developed. She is cooperative.  Non-toxic appearance. She does not appear ill. No distress.   HENT:   Head: Normocephalic and atraumatic.   Ears:   Right Ear: Hearing, external ear and ear canal normal. Tympanic membrane is bulging.   Left Ear: Hearing, external ear and ear canal normal. Tympanic membrane is bulging.   Nose: No mucosal edema, rhinorrhea or nasal deformity. No epistaxis. Right sinus exhibits maxillary sinus tenderness and frontal sinus tenderness. Left sinus exhibits maxillary sinus tenderness and frontal sinus tenderness.   Mouth/Throat: Uvula is midline, oropharynx is clear and moist and mucous membranes are normal. No trismus in the jaw. Normal dentition. No uvula swelling. No oropharyngeal exudate, posterior oropharyngeal edema or posterior oropharyngeal erythema.   Eyes: Conjunctivae and lids are normal. No visual field deficit is present. Left  eye exhibits discharge. Left eye exhibits no hordeolum. No foreign body present in the left eye. No scleral icterus.     vision grossly intact gaze aligned appropriately periorbital hyperpigmentation     Comments: Left eye lid mild swelling with yellow discharge.   Neck: Trachea normal and phonation normal. Neck supple. No edema present. No erythema present. No neck rigidity present.   Cardiovascular: Normal rate, regular rhythm, normal heart sounds and normal pulses.   Pulmonary/Chest: Effort normal and breath sounds normal. No respiratory distress. She has no decreased breath sounds. She has no rhonchi.   Abdominal: Normal appearance. Soft.   Musculoskeletal: Normal range of motion.         General: No deformity. Normal range of motion.   Lymphadenopathy:     She has cervical adenopathy.        Right cervical: Superficial cervical adenopathy present.        Left cervical: Superficial cervical adenopathy present.   Neurological: She is alert and oriented to person, place, and time. She exhibits normal muscle tone. Coordination normal.   Skin: Skin is warm, dry, intact, not diaphoretic and not pale.   Psychiatric: Her speech is normal and behavior is normal. Judgment and thought content normal.   Nursing note and vitals reviewed.    Assessment:     1. Nasal congestion    2. Acute non-recurrent pansinusitis    3. Blepharitis of left upper eyelid, unspecified type        Plan:       Nasal congestion  -     POCT COVID-19 Rapid Screening  -     POCT Influenza A/B MOLECULAR    Acute non-recurrent pansinusitis  -     amoxicillin-clavulanate 875-125mg (AUGMENTIN) 875-125 mg per tablet; Take 1 tablet by mouth every 12 (twelve) hours. for 10 days  Dispense: 20 tablet; Refill: 0  -     predniSONE (DELTASONE) 10 MG tablet; Take 3 tablets (30 mg total) by mouth once daily. for 5 days  Dispense: 15 tablet; Refill: 0    Blepharitis of left upper eyelid, unspecified type  -     erythromycin (ROMYCIN) ophthalmic ointment; Place  into the left eye 3 (three) times daily. for 5 days  Dispense: 3.5 g; Refill: 0

## 2023-07-26 NOTE — PROGRESS NOTES
Previous History                           Physical Exam      Vital Signs Reviewed          Procedures    Procedures     Labs

## 2023-08-16 LAB
INR PPP: 1
PROTHROMBIN TIME: 12.6 SECONDS (ref 11.4–14)

## 2023-08-24 ENCOUNTER — OFFICE VISIT (OUTPATIENT)
Dept: URGENT CARE | Facility: CLINIC | Age: 38
End: 2023-08-24
Payer: MEDICAID

## 2023-08-24 VITALS
SYSTOLIC BLOOD PRESSURE: 110 MMHG | BODY MASS INDEX: 33.8 KG/M2 | RESPIRATION RATE: 18 BRPM | OXYGEN SATURATION: 98 % | HEIGHT: 64 IN | HEART RATE: 69 BPM | TEMPERATURE: 98 F | WEIGHT: 198 LBS | DIASTOLIC BLOOD PRESSURE: 77 MMHG

## 2023-08-24 DIAGNOSIS — J01.10 SUBACUTE FRONTAL SINUSITIS: Primary | ICD-10-CM

## 2023-08-24 DIAGNOSIS — H61.21 IMPACTED CERUMEN OF RIGHT EAR: ICD-10-CM

## 2023-08-24 DIAGNOSIS — J02.9 SORE THROAT: ICD-10-CM

## 2023-08-24 LAB
CTP QC/QA: YES
CTP QC/QA: YES
MOLECULAR STREP A: NEGATIVE
SARS-COV-2 RDRP RESP QL NAA+PROBE: NEGATIVE

## 2023-08-24 PROCEDURE — 99213 OFFICE O/P EST LOW 20 MIN: CPT | Mod: S$PBB,,, | Performed by: NURSE PRACTITIONER

## 2023-08-24 PROCEDURE — 99214 OFFICE O/P EST MOD 30 MIN: CPT | Mod: PBBFAC | Performed by: NURSE PRACTITIONER

## 2023-08-24 PROCEDURE — 99213 PR OFFICE/OUTPT VISIT, EST, LEVL III, 20-29 MIN: ICD-10-PCS | Mod: S$PBB,,, | Performed by: NURSE PRACTITIONER

## 2023-08-24 PROCEDURE — 87651 STREP A DNA AMP PROBE: CPT | Mod: PBBFAC | Performed by: NURSE PRACTITIONER

## 2023-08-24 PROCEDURE — 87635 SARS-COV-2 COVID-19 AMP PRB: CPT | Mod: PBBFAC | Performed by: NURSE PRACTITIONER

## 2023-08-24 RX ORDER — PROMETHAZINE HYDROCHLORIDE AND DEXTROMETHORPHAN HYDROBROMIDE 6.25; 15 MG/5ML; MG/5ML
5 SYRUP ORAL
Qty: 120 ML | Refills: 0 | Status: SHIPPED | OUTPATIENT
Start: 2023-08-24 | End: 2023-11-26

## 2023-08-24 NOTE — PROGRESS NOTES
"Subjective:      Patient ID: Kinza Ford is a 37 y.o. female.    Vitals:  height is 5' 4" (1.626 m) and weight is 89.8 kg (198 lb). Her oral temperature is 98 °F (36.7 °C). Her blood pressure is 110/77 and her pulse is 69. Her respiration is 18 and oxygen saturation is 98%.     Chief Complaint: Sore Throat (Entered by patient), Headache, Nasal Congestion, and Cough    HPI As stated in CC. Pt reports chronic sinusitis and Asthma. Denies fever, dizziness, chest pain, or shortness of breath.  ROS   Objective:     Physical Exam   Constitutional:  Non-toxic appearance. She does not appear ill. No distress.   HENT:   Head: Normocephalic and atraumatic.   Ears:   Right Ear: impacted cerumen  Nose: Rhinorrhea and congestion present.   Mouth/Throat: Mucous membranes are moist. No oropharyngeal exudate or posterior oropharyngeal erythema.   Eyes: Conjunctivae are normal.   Cardiovascular: Normal rate and normal pulses.   Pulmonary/Chest: Effort normal and breath sounds normal. No stridor. No respiratory distress. She has no wheezes. She has no rhonchi. She has no rales. She exhibits no tenderness.   Abdominal: Normal appearance and bowel sounds are normal. She exhibits no distension and no mass. Soft. There is no abdominal tenderness. There is no rebound, no guarding, no left CVA tenderness and no right CVA tenderness. No hernia.   Musculoskeletal: Normal range of motion.         General: Normal range of motion.      Cervical back: She exhibits no tenderness.   Lymphadenopathy:     She has no cervical adenopathy.   Neurological: no focal deficit. She is alert.   Skin: Skin is warm, dry, not diaphoretic and no rash. Capillary refill takes less than 2 seconds.   Psychiatric: Her behavior is normal. Mood, judgment and thought content normal.   Nursing note and vitals reviewed.      Assessment:     1. Subacute frontal sinusitis    2. Sore throat    3. Impacted cerumen of right ear      Results for orders placed or " performed in visit on 08/24/23   POCT COVID-19 Rapid Screening   Result Value Ref Range    POC Rapid COVID Negative Negative     Acceptable Yes    POCT Strep A, Molecular   Result Value Ref Range    Molecular Strep A, POC Negative Negative     Acceptable Yes          Plan:   Please follow instructions on patient education material.  Return to urgent care in 2 to 3 days if symptoms are not improving, immediately if you develop any new or worsening symptoms.   ER precautions.    - OTC cold/flu products as desired for symptoms  - Plenty of fluids  - Continue saline lavage and Flonase.   -humidified air  - Tylenol or Motrin for pain/fever  - COVID/Strep tests Negative  .sc      Debrox Rx was sent to your pharmacy, however this is an over-the-counter medication and can be purchased if not covered by your insurance. Use this for maintenance of wax. As directed on packaging for 4 days in a row, once per month.  Stop using Qtips to remove wax from your ears.   If anitbiotic ear drops were prescribed for you, start those today.    Subacute frontal sinusitis  -     POCT COVID-19 Rapid Screening    Sore throat  -     POCT Strep A, Molecular    Impacted cerumen of right ear    Other orders  -     carbamide peroxide (DEBROX) 6.5 % otic solution; Place 5 drops into the right ear 2 (two) times daily.  Dispense: 15 mL; Refill: 0  -     promethazine-dextromethorphan (PROMETHAZINE-DM) 6.25-15 mg/5 mL Syrp; Take 5 mLs by mouth every 6 to 8 hours as needed (cough). May cause sedation.  Do not drive or operate heavy machinery after taking this medication.  Dispense: 120 mL; Refill: 0

## 2023-08-24 NOTE — PATIENT INSTRUCTIONS
Please follow instructions on patient education material.      Return to urgent care in 2 to 3 days if symptoms are not improving, immediately if you develop any new or worsening symptoms.     - OTC cold/flu products as desired for symptoms  - Plenty of fluids  - Continue saline lavage and Flonase.   -humidified air  - Tylenol or Motrin for pain/fever  - COVID/Strep tests Negative    Debrox Rx was sent to your pharmacy, however this is an over-the-counter medication and can be purchased if not covered by your insurance. Use this for maintenance of wax. As directed on packaging for 4 days in a row, once per month.  Stop using Qtips to remove wax from your ears.   If anitbiotic ear drops were prescribed for you, start those today.    Go to the ER if you experience chest pain with shortness of breath, shortness of breath when moving around your house, high fevers 103.0+, excessive vomiting/diarrhea, or general distress.

## 2023-08-24 NOTE — LETTER
August 24, 2023      [facility]  [facility] [address]  Phone: [phone]       Patient: Kinza Ford   YOB: 1985  Date of Visit: 08/24/2023    To Whom It May Concern:    Elijah Ford  was at [facility] on 08/24/2023. The patient may return to work/school on 8/25/2023 with no restrictions. If you have any questions or concerns, or if I can be of further assistance, please do not hesitate to contact me.    Sincerely,    CARSON Armstrong

## 2023-09-01 ENCOUNTER — PATIENT MESSAGE (OUTPATIENT)
Dept: ADMINISTRATIVE | Facility: HOSPITAL | Age: 38
End: 2023-09-01
Payer: MEDICAID

## 2023-09-07 DIAGNOSIS — E89.0 POSTSURGICAL HYPOTHYROIDISM: Primary | ICD-10-CM

## 2023-09-07 RX ORDER — LEVOTHYROXINE SODIUM 200 UG/1
200 TABLET ORAL
Qty: 90 TABLET | Refills: 1 | Status: CANCELLED | OUTPATIENT
Start: 2023-09-07

## 2023-09-07 NOTE — TELEPHONE ENCOUNTER
----- Message from Tanja Dc sent at 9/7/2023  9:57 AM CDT -----  Regarding: Med mgmt  STOUT PT         Pt is requesting a refill on Levothyroxine. She is leaving to go out of the country tomorrow and only has 3 pills left.   Cm on [facility]   Pt # [phone]

## 2023-09-07 NOTE — TELEPHONE ENCOUNTER
Last visit in [facility] ENDOCRIN[facility]: 5/26/2023    Patient's next visit in [facility] END[facility]CR[facility]: 12/19/2023     Pt states she called the pharmacy for refills and they stated the rx was closed so they would need a new eRx, pt asked for a 90 days supply.

## 2023-09-08 RX ORDER — LEVOTHYROXINE SODIUM 112 UG/1
224 TABLET ORAL
Qty: 180 TABLET | Refills: 1 | Status: SHIPPED | OUTPATIENT
Start: 2023-09-08 | End: 2023-12-11

## 2023-09-19 ENCOUNTER — OFFICE VISIT (OUTPATIENT)
Dept: ENDOCRINOLOGY | Facility: CLINIC | Age: 38
End: 2023-09-19
Payer: MEDICAID

## 2023-09-19 VITALS
SYSTOLIC BLOOD PRESSURE: 114 MMHG | WEIGHT: 197.38 LBS | DIASTOLIC BLOOD PRESSURE: 79 MMHG | HEART RATE: 72 BPM | RESPIRATION RATE: 16 BRPM | HEIGHT: 64 IN | BODY MASS INDEX: 33.7 KG/M2 | TEMPERATURE: 98 F

## 2023-09-19 DIAGNOSIS — E11.65 UNCONTROLLED TYPE 2 DIABETES MELLITUS WITH HYPERGLYCEMIA: ICD-10-CM

## 2023-09-19 DIAGNOSIS — C73 THYROID CANCER: Primary | ICD-10-CM

## 2023-09-19 DIAGNOSIS — E55.9 VITAMIN D DEFICIENCY: ICD-10-CM

## 2023-09-19 DIAGNOSIS — E89.0 POSTSURGICAL HYPOTHYROIDISM: ICD-10-CM

## 2023-09-19 PROCEDURE — 99214 OFFICE O/P EST MOD 30 MIN: CPT | Mod: PBBFAC

## 2023-09-19 RX ORDER — LANCETS
1 EACH MISCELLANEOUS 3 TIMES DAILY
Qty: 200 EACH | Refills: 11 | Status: SHIPPED | OUTPATIENT
Start: 2023-09-19

## 2023-09-19 RX ORDER — METFORMIN HYDROCHLORIDE 500 MG/1
500 TABLET, EXTENDED RELEASE ORAL 2 TIMES DAILY WITH MEALS
Qty: 180 TABLET | Refills: 3 | Status: SHIPPED | OUTPATIENT
Start: 2023-09-19 | End: 2023-12-06 | Stop reason: SDUPTHER

## 2023-09-19 RX ORDER — TIRZEPATIDE 2.5 MG/.5ML
INJECTION, SOLUTION SUBCUTANEOUS
Qty: 84 PEN | Refills: 0 | Status: SHIPPED | OUTPATIENT
Start: 2023-09-19 | End: 2023-12-06

## 2023-09-19 RX ORDER — METFORMIN HYDROCHLORIDE 1000 MG/1
1000 TABLET ORAL DAILY
COMMUNITY
End: 2023-09-19

## 2023-09-19 NOTE — PROGRESS NOTES
[facility] Visit    Chief Complaint:      Diabetes and Thyroid Cancer     Subjective:     HPI:  Kinza Ford is a 37 y.o. female who presents to Endocrinology clinic today for Diabetes and Thyroid Cancer.  History of thyroid cancer diagnosed in July 2021 and had a thyroidectomy in August 2021.  Patient referred to endocrinology clinic by ENT for further evaluation of her thyroid cancer surveillance.  Patient had an elevated globulin tumor marker level of 4.8 in November 2022.  She had repeat ultrasound of the neck showing mild scarring at site of thyroidectomy.  CT of the neck showed no pathology to me bed, no acute pathology disease at the neck.      Today: At last office visit, patient's levothyroxine dosage was increased from 200 to 224 mcg daily; however, she only started taking increased dosage approximately 1 week ago and was unable to have repeat labs drawn prior to today's visit due to rescheduling of her appointment. With regards to her DM, at last OV, trulicity was stopped due to GI side effects. However, patient reports that she also opted to decrease her metformin from BID to daily, and she stopped her pioglitazone 15 daily, farxiga 10 daily altogether and did not fill prescription for tradjenta. She alsostopped checking her cbgs reportedly due to issues with insurance coverage.  . Denies any weight gain, fatigue , diarrhea, polyuria, polydipsia at this time.     Review of Systems  Review of Systems   Constitutional:  Negative for chills, diaphoresis, fever, malaise/fatigue and weight loss.   Respiratory:  Negative for cough, hemoptysis, sputum production, shortness of breath and wheezing.    Cardiovascular:  Negative for chest pain, palpitations, orthopnea, claudication, leg swelling and PND.   Gastrointestinal:  Negative for abdominal pain, blood in stool, constipation, diarrhea, heartburn, melena, nausea and vomiting.   Genitourinary:  Negative for dysuria, flank pain,  "frequency, hematuria and urgency.   Skin:  Negative for itching and rash.   Neurological:  Negative for focal weakness and headaches.       Objective:   Last 24 Hour Vital Signs:  Vitals  BP: 114/79  Temp: 98 °F (36.7 °C)  Temp Source: Oral  Pulse: 72  Resp: 16  Height: 5' 4" (162.6 cm)  Weight: 89.5 kg (197 lb 6.4 oz)    Physical Examination:  General: well developed; pleasant and cooperative  HEENT: Normocephalic, atraumatic. Face symmetric.  Cardiovascular: regular rate, well perfused  Pulmonary: Bilateral symmetric chest rise. Non-labored  Skin:  Exposed skin is warm & dry.  Extremities: No clubbing, cyanosis or edema.  Neuro:   Patient moves all extremities equally. No signs of peripheral neurological deficit      Assessment & Plan:   Hx of stage 1 papillary thyroid cancer s/p thyroidectomy   Subsequent hypothyroidism    - thyroidectomy in August 2021   - Repeat US 11/21/22 revealing post total thyroidectomy with probable     mild scarring at the left hemithyroidectomy bed.  - CT Soft tissue neck 12/1/2022: Post thyroidectomy with no pathology identified at the thyroidectomy bed.  No acute pathology or metastatic disease identified at the neck. No significant interval change compared to the neck CT with contrast 09/22/2021.  - 5/23 labs: TSH 4.1, thyroglobulin tumor marker 1.1,thyroglobulin  <1.8,  - LT4 was increased to 224mcg (at last ov, but patient just started this dosage 1 week ago. Continue levothyroxine 224 mcg daily, recheck the above labs prior to next office visit in 3-4 months along with repeat ultrasound     DMT2    - A1c POC today 8.5, worsened from 6.4 at last office visit    - Patient self decreased metformin 1000mg BID to once daily, stopped pioglitazone and farxiga and did not fill tradjenta  -Now reports resolution of GI symptoms and willingness to restart medications. Will restart metformin  BID and initiate mounjaro 2.5 and increase dosage by 2.5 q monthly if tolerated; reassess " response at next OV  -prescription for DM supplies sent, encouraged to start checking cbgs regularly again   -A1c, cmp prior to next office visit  -foot exam UTD, referral to ophthalmology for DM eye exam sent      Follow-ups  -Follow-up in 3-4 months with labs     Kassidy Mcgowan MD  [facility] Internal Medicine PGY-3

## 2023-09-19 NOTE — TELEPHONE ENCOUNTER
----- Message from Gricel Sotelo sent at 9/19/2023 11:33 AM CDT -----  Patient was seen by Dr Rex goins     Staci with [facility], requesting call back to clarify Rx for  Kacy     [phone]    Thanks

## 2023-09-20 RX ORDER — TIRZEPATIDE 2.5 MG/.5ML
INJECTION, SOLUTION SUBCUTANEOUS
Qty: 24 PEN | Refills: 0 | Status: SHIPPED | OUTPATIENT
Start: 2023-09-20 | End: 2023-12-06

## 2023-10-25 ENCOUNTER — OFFICE VISIT (OUTPATIENT)
Dept: URGENT CARE | Facility: CLINIC | Age: 38
End: 2023-10-25
Payer: MEDICAID

## 2023-10-25 VITALS
WEIGHT: 194 LBS | HEART RATE: 102 BPM | BODY MASS INDEX: 33.12 KG/M2 | HEIGHT: 64 IN | TEMPERATURE: 99 F | RESPIRATION RATE: 18 BRPM | SYSTOLIC BLOOD PRESSURE: 114 MMHG | OXYGEN SATURATION: 96 % | DIASTOLIC BLOOD PRESSURE: 79 MMHG

## 2023-10-25 DIAGNOSIS — J02.9 SORE THROAT: ICD-10-CM

## 2023-10-25 DIAGNOSIS — J02.0 STREP THROAT: Primary | ICD-10-CM

## 2023-10-25 LAB
CTP QC/QA: YES
CTP QC/QA: YES
MOLECULAR STREP A: POSITIVE
SARS-COV-2 RDRP RESP QL NAA+PROBE: NEGATIVE

## 2023-10-25 PROCEDURE — 87635 SARS-COV-2 COVID-19 AMP PRB: CPT | Mod: PBBFAC

## 2023-10-25 PROCEDURE — 99213 PR OFFICE/OUTPT VISIT, EST, LEVL III, 20-29 MIN: ICD-10-PCS | Mod: S$PBB,,,

## 2023-10-25 PROCEDURE — 87651 STREP A DNA AMP PROBE: CPT | Mod: PBBFAC

## 2023-10-25 PROCEDURE — 99214 OFFICE O/P EST MOD 30 MIN: CPT | Mod: PBBFAC

## 2023-10-25 PROCEDURE — 99213 OFFICE O/P EST LOW 20 MIN: CPT | Mod: S$PBB,,,

## 2023-10-25 RX ORDER — AMOXICILLIN 500 MG/1
500 TABLET, FILM COATED ORAL EVERY 12 HOURS
Qty: 20 TABLET | Refills: 0 | Status: SHIPPED | OUTPATIENT
Start: 2023-10-25 | End: 2023-11-04

## 2023-10-25 NOTE — PROGRESS NOTES
"Subjective:       Patient ID: Kinza Ford is a 38 y.o. female.    Vitals:  height is 5' 4" (1.626 m) and weight is 88 kg (194 lb 0.1 oz). Her oral temperature is 98.7 °F (37.1 °C). Her blood pressure is 114/79 and her pulse is 102. Her respiration is 18 and oxygen saturation is 96%.     Chief Complaint: Nasal Congestion (Sore throat, headache - Entered by patient) and URI (Sore throat, headache, nasal congestion for 2 days)    Reports post nasal drip with sore throat , headache, and fatigue x 2 day. Denies fever, reports exposed to Strep from son.     URI   This is a new problem. The current episode started in the past 7 days. Associated symptoms include congestion, diarrhea, ear pain, rhinorrhea, sinus pain and a sore throat. Pertinent negatives include no coughing, nausea, neck pain or vomiting. She has tried antihistamine for the symptoms. The treatment provided mild relief.       Constitution: Positive for fatigue. Negative for chills, sweating and fever.   HENT:  Positive for ear pain, congestion, postnasal drip, sinus pain, sinus pressure and sore throat. Negative for ear discharge and trouble swallowing.    Neck: Negative for neck pain.   Eyes: Negative.    Respiratory:  Negative for chest tightness, cough and sputum production.    Gastrointestinal:  Positive for diarrhea. Negative for nausea and vomiting.   Musculoskeletal:  Negative for muscle ache.   Skin: Negative.    Allergic/Immunologic: Positive for seasonal allergies.       Objective:      Physical Exam   Constitutional: She is oriented to person, place, and time. She is cooperative. awake  HENT:   Head: Normocephalic and atraumatic.   Ears:   Right Ear: Tympanic membrane normal.   Left Ear: Tympanic membrane normal.   Nose: No rhinorrhea or congestion. Right sinus exhibits maxillary sinus tenderness and frontal sinus tenderness. Left sinus exhibits maxillary sinus tenderness and frontal sinus tenderness.   Mouth/Throat: Uvula is midline. " Mucous membranes are moist. Posterior oropharyngeal erythema and cobblestoning present. Tonsils are 0 on the right. Tonsils are 0 on the left. Oropharynx is clear.   Eyes: Conjunctivae and lids are normal.   Neck: Neck supple.   Cardiovascular: Normal rate and regular rhythm.   Pulmonary/Chest: Effort normal and breath sounds normal.   Abdominal: Normal appearance and bowel sounds are normal. Soft. There is no abdominal tenderness.   Lymphadenopathy:     She has no cervical adenopathy.   Neurological: no focal deficit. She is alert and oriented to person, place, and time. GCS eye subscore is 4. GCS verbal subscore is 5. GCS motor subscore is 6.   Skin: Skin is warm, dry and intact.   Psychiatric: She experiences Normal attention and Normal perception. Her speech is normal and behavior is normal. Mood and affect normal.   Nursing note and vitals reviewed.        Assessment:       1. Strep throat    2. Sore throat          Plan:   Your Strep test today in clinic is positive. Please,  change your toothbrush 48-72 hours after beginning antibiotics, gargle with warm salt water , Tylenol/Motrin as tolerated for pain/fever. Follow up with PCP or report to ED if symptoms worsens.     Strep throat  -     amoxicillin (AMOXIL) 500 MG Tab; Take 1 tablet (500 mg total) by mouth every 12 (twelve) hours. for 10 days  Dispense: 20 tablet; Refill: 0    Sore throat  -     POCT COVID-19 Rapid Screening  -     POCT Strep A, Molecular           Results for orders placed or performed in visit on 10/25/23   POCT COVID-19 Rapid Screening   Result Value Ref Range    POC Rapid COVID Negative Negative     Acceptable Yes    POCT Strep A, Molecular   Result Value Ref Range    Molecular Strep A, POC Positive (A) Negative     Acceptable Yes

## 2023-10-25 NOTE — PROGRESS NOTES
"Subjective:       Patient ID: Kinza Ford is a 38 y.o. female.    Vitals:  height is 5' 4" (1.626 m) and weight is 88 kg (194 lb 0.1 oz). Her oral temperature is 98.7 °F (37.1 °C). Her blood pressure is 114/79 and her pulse is 102. Her respiration is 18 and oxygen saturation is 96%.     Chief Complaint: Nasal Congestion (Sore throat, headache - Entered by patient) and URI (Sore throat, headache, nasal congestion for 2 days)    Reports post nasal drip with sore throat , headache, and fatigue x 2 day. Denies fever, reports exposed to Strep from son.     URI   This is a new problem. The current episode started in the past 7 days. Associated symptoms include congestion, diarrhea, ear pain, rhinorrhea, sinus pain and a sore throat. Pertinent negatives include no coughing, nausea, neck pain or vomiting. She has tried antihistamine for the symptoms. The treatment provided mild relief.       Constitution: Positive for fatigue. Negative for chills, sweating and fever.   HENT:  Positive for ear pain, congestion, postnasal drip, sinus pain, sinus pressure and sore throat. Negative for ear discharge and trouble swallowing.    Neck: Negative for neck pain.   Eyes: Negative.    Respiratory:  Negative for chest tightness, cough and sputum production.    Gastrointestinal:  Positive for diarrhea. Negative for nausea and vomiting.   Musculoskeletal:  Negative for muscle ache.   Skin: Negative.    Allergic/Immunologic: Positive for seasonal allergies.       Objective:      Physical Exam   Constitutional: She is cooperative. awake  Abdominal: Normal appearance.   Neurological: She is alert.   Nursing note and vitals reviewed.        Assessment:       1. Strep throat    2. Sore throat          Plan:   Your Strep test today in clinic is positive. Please,  change your toothbrush 48-72 hours after beginning antibiotics, gargle with warm salt water , Tylenol/Motrin as tolerated for pain/fever. Follow up with PCP or report to ED " if symptoms worsens.     Strep throat  -     amoxicillin (AMOXIL) 500 MG Tab; Take 1 tablet (500 mg total) by mouth every 12 (twelve) hours. for 10 days  Dispense: 20 tablet; Refill: 0    Sore throat  -     POCT COVID-19 Rapid Screening  -     POCT Strep A, Molecular           Results for orders placed or performed in visit on 10/25/23   POCT Strep A, Molecular   Result Value Ref Range    Molecular Strep A, POC Positive (A) Negative     Acceptable Yes

## 2023-10-25 NOTE — LETTER
October 25, 2023      [facility]  [facility] [facility] [address]  Phone: [phone]       Patient: Kinza Ford   YOB: 1985  Date of Visit: 10/25/2023    To Whom It May Concern:    Elijah Ford  was at [facility] on 10/25/2023. The patient may return to work/school on 10/27/2023 with no restrictions. If you have any questions or concerns, or if I can be of further assistance, please do not hesitate to contact me.    Sincerely,    CARSON Stephens

## 2023-11-26 ENCOUNTER — OFFICE VISIT (OUTPATIENT)
Dept: URGENT CARE | Facility: CLINIC | Age: 38
End: 2023-11-26
Payer: MEDICAID

## 2023-11-26 VITALS
WEIGHT: 188 LBS | SYSTOLIC BLOOD PRESSURE: 113 MMHG | HEART RATE: 114 BPM | DIASTOLIC BLOOD PRESSURE: 82 MMHG | OXYGEN SATURATION: 98 % | RESPIRATION RATE: 18 BRPM | TEMPERATURE: 98 F | HEIGHT: 64 IN | BODY MASS INDEX: 32.1 KG/M2

## 2023-11-26 DIAGNOSIS — T88.7XXA MEDICATION SIDE EFFECT: ICD-10-CM

## 2023-11-26 DIAGNOSIS — R10.84 GENERALIZED ABDOMINAL PAIN: Primary | ICD-10-CM

## 2023-11-26 DIAGNOSIS — R11.2 NAUSEA AND VOMITING, UNSPECIFIED VOMITING TYPE: ICD-10-CM

## 2023-11-26 LAB
BILIRUB UR QL STRIP: NEGATIVE
GLUCOSE SERPL-MCNC: 211 MG/DL (ref 70–110)
GLUCOSE UR QL STRIP: POSITIVE
KETONES UR QL STRIP: NEGATIVE
LEUKOCYTE ESTERASE UR QL STRIP: POSITIVE
PH, POC UA: 6
POC BLOOD, URINE: POSITIVE
POC NITRATES, URINE: NEGATIVE
PROT UR QL STRIP: POSITIVE
SP GR UR STRIP: >1.03 (ref 1–1.03)
UROBILINOGEN UR STRIP-ACNC: 0.2 (ref 0.1–1.1)

## 2023-11-26 PROCEDURE — 99214 PR OFFICE/OUTPT VISIT, EST, LEVL IV, 30-39 MIN: ICD-10-PCS | Mod: S$PBB,,,

## 2023-11-26 PROCEDURE — 82962 GLUCOSE BLOOD TEST: CPT | Mod: PBBFAC

## 2023-11-26 PROCEDURE — 99213 OFFICE O/P EST LOW 20 MIN: CPT | Mod: PBBFAC

## 2023-11-26 PROCEDURE — 81003 URINALYSIS AUTO W/O SCOPE: CPT | Mod: PBBFAC

## 2023-11-26 PROCEDURE — 99214 OFFICE O/P EST MOD 30 MIN: CPT | Mod: S$PBB,,,

## 2023-11-26 RX ORDER — METOCLOPRAMIDE 10 MG/1
10 TABLET ORAL
Qty: 20 TABLET | Refills: 0 | Status: SHIPPED | OUTPATIENT
Start: 2023-11-26 | End: 2023-12-01

## 2023-11-26 RX ORDER — ONDANSETRON 4 MG/1
4 TABLET, ORALLY DISINTEGRATING ORAL EVERY 8 HOURS PRN
Qty: 10 TABLET | Refills: 0 | Status: SHIPPED | OUTPATIENT
Start: 2023-11-26 | End: 2023-12-01

## 2023-11-26 NOTE — LETTER
November 26, 2023      [facility]  [facility] [facility] [address]  Phone: [phone]       Patient: Kinza Ford   YOB: 1985  Date of Visit: 11/26/2023    To Whom It May Concern:    Elijah Ford  was at [facility] on 11/26/2023. The patient may return to work/school on 11/28/2023. with no restrictions. If you have any questions or concerns, or if I can be of further assistance, please do not hesitate to contact me.    Sincerely,    CARSON Stephens

## 2023-11-26 NOTE — PROGRESS NOTES
"Subjective:       Patient ID: Kinza Ford is a 38 y.o. female.    Vitals:  height is 5' 4" (1.626 m) and weight is 85.3 kg (188 lb). Her oral temperature is 98.2 °F (36.8 °C). Her blood pressure is 113/82 and her pulse is 114 (abnormal). Her respiration is 18 and oxygen saturation is 98%.     Chief Complaint: Abdominal Pain (Diarrhea, vomiting, nausea since Thanksgiving - Entered by patient) and Nausea (Patient reports having a reaction to Monjuro. Symptoms nausea, vomiting, diarrhea, no appetite, abdominal cramping and leg cramps. Patient states she had the same reaction with Trulicity and Ozempic. . Patient requesting nausea medication.)    Accompaied to clinic with boyfriend, reports recently started Monajauro x 3 weeks ago which worsening abdominal pain , diarrhea, bloating, and indigestion.  States similar symptoms with Ozempic and Trulicity. Has tried Pepto with no relief. States diarrhea has improved.     Abdominal Pain  Associated symptoms include diarrhea, nausea and vomiting. Pertinent negatives include no fever.   Nausea  Associated symptoms include abdominal pain, diaphoresis, fatigue, nausea and vomiting. Pertinent negatives include no fever.       Constitution: Positive for sweating and fatigue. Negative for fever.   Gastrointestinal:  Positive for abdominal pain, abdominal bloating, nausea, vomiting and diarrhea.       Objective:      Physical Exam   Constitutional: She is oriented to person, place, and time. She is cooperative.      Comments:Skin turgor elastic, no tenting.    awake  HENT:   Head: Normocephalic and atraumatic.   Mouth/Throat: Uvula is midline and oropharynx is clear and moist. Mucous membranes are pale and dry. Abnormal dentition. Dental caries present. Tonsils are 0 on the right. Tonsils are 0 on the left.   Eyes: Conjunctivae and lids are normal.   Neck: Neck supple.   Cardiovascular: S1 normal, S2 normal and normal heart sounds. Tachycardia present.   Pulmonary/Chest: " Effort normal and breath sounds normal.   Abdominal: Normal appearance. Soft. Bowel sounds are increased. There is no abdominal tenderness. There is no left CVA tenderness and no right CVA tenderness.   Lymphadenopathy:     She has no cervical adenopathy.   Neurological: no focal deficit. She is alert and oriented to person, place, and time. Gait and coordination normal. GCS eye subscore is 4. GCS verbal subscore is 5. GCS motor subscore is 6.   Skin: Skin is warm and dry. Capillary refill takes less than 2 seconds.   Psychiatric: Her speech is normal and behavior is normal. Judgment normal. Cognition normal  Nursing note and vitals reviewed.        Assessment:       1. Generalized abdominal pain    2. Nausea and vomiting, unspecified vomiting type    3. Medication side effect          Plan:      Urgent Care Management:  Patient appears mildly dehydrated, informed patient she may benefit from IV fluids and ED evaluation. Patient refused Ed treatment, she vocalizes understanding risks of dehydration.     Urine reflects dehydration , will treat with with Reglan and Zofran to help with gastroparesis symptoms. Informed patient to contact Dr. Goodman on Monday. Strict ED precautions discussed with visitor and patient.     Generalized abdominal pain  -     metoclopramide HCl (REGLAN) 10 MG tablet; Take 1 tablet (10 mg total) by mouth 4 (four) times daily before meals and nightly. for 5 days  Dispense: 20 tablet; Refill: 0    Nausea and vomiting, unspecified vomiting type  -     POCT Glucose, Hand-Held Device  -     POCT Urinalysis, Dipstick, Automated, W/O Scope  -     ondansetron (ZOFRAN-ODT) 4 MG TbDL; Take 1 tablet (4 mg total) by mouth every 8 (eight) hours as needed (nausea/vomiting).  Dispense: 10 tablet; Refill: 0    Medication side effect           Results for orders placed or performed in visit on 11/26/23   POCT Glucose, Hand-Held Device   Result Value Ref Range    POC Glucose 211 (A) 70 - 110 MG/DL   POCT  Urinalysis, Dipstick, Automated, W/O Scope   Result Value Ref Range    POC Blood, Urine Positive (A) Negative    POC Bilirubin, Urine Negative Negative    POC Urobilinogen, Urine 0.2 0.1 - 1.1    POC Ketones, Urine Negative Negative    POC Protein, Urine Positive (A) Negative    POC Nitrates, Urine Negative Negative    POC Glucose, Urine Positive (A) Negative    pH, UA 6.0     POC Specific Gravity, Urine >1.030 1.003 - 1.029    POC Leukocytes, Urine Positive (A) Negative

## 2023-11-27 ENCOUNTER — TELEPHONE (OUTPATIENT)
Dept: ENDOCRINOLOGY | Facility: CLINIC | Age: 38
End: 2023-11-27
Payer: MEDICAID

## 2023-11-27 NOTE — TELEPHONE ENCOUNTER
----- Message from Tracey Vasques sent at 11/27/2023  1:54 PM CST -----  Regarding: requesting call back  Pt of [place]  Pt called complaining of stomach ache and nausea due to medication and is requesting a call back    [phone] please advise

## 2023-11-27 NOTE — TELEPHONE ENCOUNTER
Spoke with patient, states she went to urgent care yesterday (Report available on chart review) because she started having vomiting and diarrhea  every couple hours after her third shot of Mounjaro, pt states was prescribed Mounjaro during her last visit in September but was too afraid to start taking the medication, so she just filled it for the first time 3 weeks ago, states after the second shot her GI symptoms started. Pt states she's had the same side effects with Ozempic and Trulicity, states she cannot take any medication in that family. Currently is taking Metformin 1000mg XR daily, states she used to take farxiga and never had any side effects with that medication.

## 2023-11-27 NOTE — TELEPHONE ENCOUNTER
Recommend holding the mounjaro while we are working on getting her into clinic to be seen in a cancelation slot in the coming days to sort DM mgmt.    ED precautions in effect given the tachycardia yesterday should concerning signs/symptoms manifest in the interim.

## 2023-12-01 NOTE — TELEPHONE ENCOUNTER
Please offer this patient one of the empty newly created slots next Wednesday afternoon or any cancelation slots otherwise next week to get her in to be seen to sort her DM given her issues brittany/ mounjaro.

## 2023-12-06 ENCOUNTER — OFFICE VISIT (OUTPATIENT)
Dept: ENDOCRINOLOGY | Facility: CLINIC | Age: 38
End: 2023-12-06
Payer: MEDICAID

## 2023-12-06 VITALS
WEIGHT: 199.63 LBS | RESPIRATION RATE: 16 BRPM | HEIGHT: 64 IN | TEMPERATURE: 98 F | HEART RATE: 76 BPM | SYSTOLIC BLOOD PRESSURE: 109 MMHG | BODY MASS INDEX: 34.08 KG/M2 | DIASTOLIC BLOOD PRESSURE: 73 MMHG

## 2023-12-06 DIAGNOSIS — C73 THYROID CANCER: ICD-10-CM

## 2023-12-06 DIAGNOSIS — E11.9 TYPE 2 DIABETES MELLITUS WITHOUT COMPLICATION, WITHOUT LONG-TERM CURRENT USE OF INSULIN: Primary | ICD-10-CM

## 2023-12-06 DIAGNOSIS — E55.9 VITAMIN D DEFICIENCY: ICD-10-CM

## 2023-12-06 DIAGNOSIS — E89.0 HYPOTHYROIDISM, POSTSURGICAL: ICD-10-CM

## 2023-12-06 LAB
ALBUMIN SERPL-MCNC: 4.1 G/DL (ref 3.5–5)
ALBUMIN/GLOB SERPL: 1.1 RATIO (ref 1.1–2)
ALP SERPL-CCNC: 70 UNIT/L (ref 40–150)
ALT SERPL-CCNC: 30 UNIT/L (ref 0–55)
AST SERPL-CCNC: 13 UNIT/L (ref 5–34)
BILIRUB SERPL-MCNC: 0.5 MG/DL
BUN SERPL-MCNC: 14.4 MG/DL (ref 7–18.7)
CALCIUM SERPL-MCNC: 9.7 MG/DL (ref 8.4–10.2)
CHLORIDE SERPL-SCNC: 102 MMOL/L (ref 98–107)
CO2 SERPL-SCNC: 28 MMOL/L (ref 22–29)
CREAT SERPL-MCNC: 0.93 MG/DL (ref 0.55–1.02)
DEPRECATED CALCIDIOL+CALCIFEROL SERPL-MC: 27.8 NG/ML (ref 30–80)
GFR SERPLBLD CREATININE-BSD FMLA CKD-EPI: >60 MLS/MIN/1.73/M2
GLOBULIN SER-MCNC: 3.9 GM/DL (ref 2.4–3.5)
GLUCOSE SERPL-MCNC: 285 MG/DL (ref 74–100)
HBA1C MFR BLD: 9.9 %
POTASSIUM SERPL-SCNC: 3.9 MMOL/L (ref 3.5–5.1)
PROT SERPL-MCNC: 8 GM/DL (ref 6.4–8.3)
SODIUM SERPL-SCNC: 137 MMOL/L (ref 136–145)
T4 FREE SERPL-MCNC: 0.98 NG/DL (ref 0.7–1.48)
TSH SERPL-ACNC: 8.65 UIU/ML (ref 0.35–4.94)

## 2023-12-06 PROCEDURE — 99214 OFFICE O/P EST MOD 30 MIN: CPT | Mod: PBBFAC

## 2023-12-06 PROCEDURE — 80053 COMPREHEN METABOLIC PANEL: CPT

## 2023-12-06 PROCEDURE — 84443 ASSAY THYROID STIM HORMONE: CPT

## 2023-12-06 PROCEDURE — 83036 HEMOGLOBIN GLYCOSYLATED A1C: CPT | Mod: PBBFAC

## 2023-12-06 PROCEDURE — 86800 THYROGLOBULIN ANTIBODY: CPT

## 2023-12-06 PROCEDURE — 36415 COLL VENOUS BLD VENIPUNCTURE: CPT

## 2023-12-06 PROCEDURE — 84439 ASSAY OF FREE THYROXINE: CPT

## 2023-12-06 PROCEDURE — 82306 VITAMIN D 25 HYDROXY: CPT

## 2023-12-06 RX ORDER — LINAGLIPTIN 5 MG/1
5 TABLET, FILM COATED ORAL DAILY
Qty: 90 TABLET | Refills: 3 | Status: SHIPPED | OUTPATIENT
Start: 2023-12-06 | End: 2024-12-05

## 2023-12-06 RX ORDER — METFORMIN HYDROCHLORIDE 500 MG/1
1000 TABLET, EXTENDED RELEASE ORAL NIGHTLY
Qty: 180 TABLET | Refills: 3 | Status: SHIPPED | OUTPATIENT
Start: 2023-12-06 | End: 2024-12-05

## 2023-12-06 RX ORDER — PIOGLITAZONEHYDROCHLORIDE 30 MG/1
30 TABLET ORAL DAILY
Qty: 90 TABLET | Refills: 3 | Status: SHIPPED | OUTPATIENT
Start: 2023-12-06 | End: 2024-12-05

## 2023-12-06 NOTE — PROGRESS NOTES
[facility] Endocrinology Clinic Visit    Chief Complaint:      Thyroid Cancer     Subjective:     HPI:  Kinza Ford is a 38 y.o. female who presents to Endocrinology clinic today for Thyroid Cancer.  History of thyroid cancer diagnosed in July 2021 and had a thyroidectomy in August 2021.  Patient referred to endocrinology clinic by ENT for further evaluation of her thyroid cancer surveillance.  Patient had an elevated globulin tumor marker level of 4.8 in November 2022.  She had repeat ultrasound of the neck showing mild scarring at site of thyroidectomy.  CT of the neck showed no pathology, no acute pathology disease at the neck.      (9/19/23): At last office visit, patient's levothyroxine dosage was increased from 200 to 224 mcg daily; however, she only started taking increased dosage approximately 1 week ago and was unable to have repeat labs drawn prior to today's visit due to rescheduling of her appointment. With regards to her DM, at last OV, trulicity was stopped due to GI side effects. However, patient reports that she also opted to decrease her metformin from BID to daily, and she stopped her pioglitazone 15 daily, farxiga 10 daily altogether and did not fill prescription for tradjenta. She alsostopped checking her cbgs reportedly due to issues with insurance coverage. Denies any weight gain, fatigue , diarrhea, polyuria, polydipsia at this time.     Today (12/6/23):  S visit; patient reports feeling better overall though states she is had occupational challenges to eating/taking medications appropriately. This situation has since improved though had to unfortunately self discontinue previously prescribed Mounjoro after taking a proximally x3 doses total due to significant GI symptoms (nausea, vomiting, diarrhea, abdominal cramping course).  Patient states this occurred after also taking Trulicity and Ozempic in the past in his care to take this medication diet moving forward.  Last dose of majora a  "proximally Thanksgiving.  We discussed restarting previously prescribed Tradjenta & Pioglitazone, and uptitrating Metformin to 1000mg BID own in which she was amenable.  Has not taken CBG readings in multple months. In regards to her previous thyroid cancer s/p thyroidectomy and subsequent hypo thyroidism, she reports close compliance with Synthroid 244 mcg q.d..  She does endorse symptoms such as anxiety, heat intolerance, and episodes of intermittent diaphoresis.  She denies any difficulty with sleep, tachycardia, palpitations, diarrhea, unexpected weight loss.  Denies any new palpable nodules the previous surgical site or regional adenopathy.     Review of Systems  Review of Systems   Constitutional:  Negative for chills, diaphoresis, fever, malaise/fatigue and weight loss.   Respiratory:  Negative for cough, hemoptysis, sputum production, shortness of breath and wheezing.    Cardiovascular:  Negative for chest pain, palpitations, orthopnea, claudication, leg swelling and PND.   Gastrointestinal:  Negative for abdominal pain, blood in stool, constipation, diarrhea, heartburn, melena, nausea and vomiting.   Genitourinary:  Negative for dysuria, flank pain, frequency, hematuria and urgency.   Skin:  Negative for itching and rash.   Neurological:  Negative for focal weakness and headaches.       Objective:   Last 24 Hour Vital Signs:  Vitals  BP: 109/73  Temp: 97.8 °F (36.6 °C)  Temp Source: Oral  Pulse: 76  Resp: 16  Height: 5' 4" (162.6 cm)  Weight: 90.5 kg (199 lb 9.6 oz)    Physical Examination:  General: well developed; pleasant and cooperative in no acute distress  HEENT: Normocephalic, atraumatic. Face symmetric.  Cardiovascular: regular rate, well perfused. S1/S2 appropriate w/out any appreciable R/M/G  Pulmonary: Bilateral symmetric chest rise. Non-labored  Skin:  Exposed skin is warm & dry.  Extremities: No clubbing, cyanosis or edema.  Neuro:   Patient moves all extremities equally. No signs of peripheral " neurological deficit    Protective Sensation (w/ 10 gram monofilament):  Right: Intact  Left: Intact    Visual Inspection:  Normal -  Bilateral    Pedal Pulses:   Right: Present  Left: Present    Posterior Tibialis Pulses:   Right:Present  Left: Present        Assessment & Plan:   1) Hx of stage 1 papillary thyroid cancer s/p thyroidectomy   2) Hypothyroidism s/p above Thyroidectomy   - S/p Thyroidectomy in (8/2021)   - Repeat US 11/21/22 revealing post total thyroidectomy with probable mild scarring at the left hemithyroidectomy bed.  - CT Soft tissue neck 12/1/2022: Post thyroidectomy with no pathology identified at the thyroidectomy bed.  No acute pathology or metastatic disease identified at the neck. No significant interval change compared to the neck CT with contrast 09/22/2021.  - 5/23 labs: TSH 4.1, thyroglobulin tumor marker 1.1,thyroglobulin  <1.8  - Pending repeat TSH, TgAb, & Tg Tumor Marker today (12/6/23)  - C/w Synthroid 224mcg at this time while pending above TSH for medication efficacy & titration   - Patient currently scheduled to complete U/S thyroid (12/20/23) for surveillance    3) Type II Diabetes Mellitus - poorly controlled   - A1c POC today 9.9, worsened from 8.5 at last office visit    - She was unable to tolerate Mounjaro 2/2 significant GI symptoms (N/V/Diarrhea/Abdominal Cramping); previously tried Trulicity & Ozempic w/ similar symptoms; asking not to restart   - Increasing Metformin 1000mg qd to 1000mg BID  - Restarting Tradjenta & Pioglitazone  - Discussed possibly restarting Farxiga though will hold off at this time (remote hx of multiple UTIs)  - Counseled patient regarding continued to refrain from high-sugar content drinks/foods & increase exercise as tolerated   - A1c, cmp prior to next office visit  - DM Foot Exam performed in clinic today (12/6/23)  - DM Eye Exam due (2/5/23)      Follow-ups: RTC in 3-4 months with labs       Pio rCuz MD  [facility]  PGY-III

## 2023-12-08 LAB
ENDOCRINOLOGIST REVIEW: ABNORMAL
THYROGLOB AB SERPL-ACNC: <1.8 IU/ML
THYROGLOB SERPL-MCNC: 2.5 NG/ML

## 2023-12-11 ENCOUNTER — CLINICAL SUPPORT (OUTPATIENT)
Dept: FAMILY MEDICINE | Facility: CLINIC | Age: 38
End: 2023-12-11
Attending: NURSE PRACTITIONER
Payer: MEDICAID

## 2023-12-11 ENCOUNTER — OFFICE VISIT (OUTPATIENT)
Dept: FAMILY MEDICINE | Facility: CLINIC | Age: 38
End: 2023-12-11
Payer: MEDICAID

## 2023-12-11 VITALS
WEIGHT: 195 LBS | SYSTOLIC BLOOD PRESSURE: 109 MMHG | RESPIRATION RATE: 18 BRPM | HEIGHT: 64 IN | BODY MASS INDEX: 33.29 KG/M2 | DIASTOLIC BLOOD PRESSURE: 76 MMHG | TEMPERATURE: 98 F | OXYGEN SATURATION: 99 % | HEART RATE: 79 BPM

## 2023-12-11 DIAGNOSIS — L81.9 CHANGE IN MULTIPLE PIGMENTED SKIN LESIONS: ICD-10-CM

## 2023-12-11 DIAGNOSIS — E11.9 TYPE 2 DIABETES MELLITUS WITHOUT COMPLICATION, WITHOUT LONG-TERM CURRENT USE OF INSULIN: ICD-10-CM

## 2023-12-11 DIAGNOSIS — E55.9 VITAMIN D DEFICIENCY: Primary | ICD-10-CM

## 2023-12-11 DIAGNOSIS — F43.23 ADJUSTMENT DISORDER WITH MIXED ANXIETY AND DEPRESSED MOOD: ICD-10-CM

## 2023-12-11 DIAGNOSIS — K76.0 STEATOSIS OF LIVER: ICD-10-CM

## 2023-12-11 DIAGNOSIS — E55.9 VITAMIN D DEFICIENCY: ICD-10-CM

## 2023-12-11 DIAGNOSIS — R22.32 AXILLARY LUMP, LEFT: ICD-10-CM

## 2023-12-11 DIAGNOSIS — J45.20 MILD INTERMITTENT ASTHMA WITHOUT COMPLICATION: Primary | ICD-10-CM

## 2023-12-11 DIAGNOSIS — E11.9 TYPE 2 DIABETES MELLITUS WITHOUT COMPLICATION, WITHOUT LONG-TERM CURRENT USE OF INSULIN: Primary | ICD-10-CM

## 2023-12-11 DIAGNOSIS — Z78.9 STATIN INTOLERANCE: ICD-10-CM

## 2023-12-11 DIAGNOSIS — E78.2 MIXED HYPERLIPIDEMIA: ICD-10-CM

## 2023-12-11 DIAGNOSIS — E89.0 POSTSURGICAL HYPOTHYROIDISM: ICD-10-CM

## 2023-12-11 DIAGNOSIS — E89.0 HISTORY OF THYROIDECTOMY: ICD-10-CM

## 2023-12-11 LAB
BASOPHILS # BLD AUTO: 0.06 X10(3)/MCL
BASOPHILS NFR BLD AUTO: 0.7 %
EOSINOPHIL # BLD AUTO: 0.44 X10(3)/MCL (ref 0–0.9)
EOSINOPHIL NFR BLD AUTO: 4.9 %
ERYTHROCYTE [DISTWIDTH] IN BLOOD BY AUTOMATED COUNT: 12.7 % (ref 11.5–17)
HCT VFR BLD AUTO: 42 % (ref 37–47)
HGB BLD-MCNC: 14.5 G/DL (ref 12–16)
IMM GRANULOCYTES # BLD AUTO: 0.02 X10(3)/MCL (ref 0–0.04)
IMM GRANULOCYTES NFR BLD AUTO: 0.2 %
LYMPHOCYTES # BLD AUTO: 2.56 X10(3)/MCL (ref 0.6–4.6)
LYMPHOCYTES NFR BLD AUTO: 28.6 %
MCH RBC QN AUTO: 28.7 PG (ref 27–31)
MCHC RBC AUTO-ENTMCNC: 34.5 G/DL (ref 33–36)
MCV RBC AUTO: 83 FL (ref 80–94)
MONOCYTES # BLD AUTO: 0.53 X10(3)/MCL (ref 0.1–1.3)
MONOCYTES NFR BLD AUTO: 5.9 %
NEUTROPHILS # BLD AUTO: 5.33 X10(3)/MCL (ref 2.1–9.2)
NEUTROPHILS NFR BLD AUTO: 59.7 %
NRBC BLD AUTO-RTO: 0 %
PLATELET # BLD AUTO: 174 X10(3)/MCL (ref 130–400)
PMV BLD AUTO: 11.9 FL (ref 7.4–10.4)
RBC # BLD AUTO: 5.06 X10(6)/MCL (ref 4.2–5.4)
WBC # SPEC AUTO: 8.94 X10(3)/MCL (ref 4.5–11.5)

## 2023-12-11 PROCEDURE — 3078F PR MOST RECENT DIASTOLIC BLOOD PRESSURE < 80 MM HG: ICD-10-PCS | Mod: CPTII,,, | Performed by: NURSE PRACTITIONER

## 2023-12-11 PROCEDURE — 3008F BODY MASS INDEX DOCD: CPT | Mod: CPTII,,, | Performed by: NURSE PRACTITIONER

## 2023-12-11 PROCEDURE — 99215 OFFICE O/P EST HI 40 MIN: CPT | Mod: PBBFAC,PN | Performed by: NURSE PRACTITIONER

## 2023-12-11 PROCEDURE — 3066F PR DOCUMENTATION OF TREATMENT FOR NEPHROPATHY: ICD-10-PCS | Mod: CPTII,,, | Performed by: NURSE PRACTITIONER

## 2023-12-11 PROCEDURE — 1159F MED LIST DOCD IN RCRD: CPT | Mod: CPTII,,, | Performed by: NURSE PRACTITIONER

## 2023-12-11 PROCEDURE — 36415 COLL VENOUS BLD VENIPUNCTURE: CPT | Performed by: NURSE PRACTITIONER

## 2023-12-11 PROCEDURE — 3066F NEPHROPATHY DOC TX: CPT | Mod: CPTII,,, | Performed by: NURSE PRACTITIONER

## 2023-12-11 PROCEDURE — 3078F DIAST BP <80 MM HG: CPT | Mod: CPTII,,, | Performed by: NURSE PRACTITIONER

## 2023-12-11 PROCEDURE — 3061F PR NEG MICROALBUMINURIA RESULT DOCUMENTED/REVIEW: ICD-10-PCS | Mod: CPTII,,, | Performed by: NURSE PRACTITIONER

## 2023-12-11 PROCEDURE — 3074F PR MOST RECENT SYSTOLIC BLOOD PRESSURE < 130 MM HG: ICD-10-PCS | Mod: CPTII,,, | Performed by: NURSE PRACTITIONER

## 2023-12-11 PROCEDURE — 99214 PR OFFICE/OUTPT VISIT, EST, LEVL IV, 30-39 MIN: ICD-10-PCS | Mod: S$PBB,,, | Performed by: NURSE PRACTITIONER

## 2023-12-11 PROCEDURE — 3008F PR BODY MASS INDEX (BMI) DOCUMENTED: ICD-10-PCS | Mod: CPTII,,, | Performed by: NURSE PRACTITIONER

## 2023-12-11 PROCEDURE — 3061F NEG MICROALBUMINURIA REV: CPT | Mod: CPTII,,, | Performed by: NURSE PRACTITIONER

## 2023-12-11 PROCEDURE — 99214 OFFICE O/P EST MOD 30 MIN: CPT | Mod: S$PBB,,, | Performed by: NURSE PRACTITIONER

## 2023-12-11 PROCEDURE — 3046F PR MOST RECENT HEMOGLOBIN A1C LEVEL > 9.0%: ICD-10-PCS | Mod: CPTII,,, | Performed by: NURSE PRACTITIONER

## 2023-12-11 PROCEDURE — 3074F SYST BP LT 130 MM HG: CPT | Mod: CPTII,,, | Performed by: NURSE PRACTITIONER

## 2023-12-11 PROCEDURE — 1159F PR MEDICATION LIST DOCUMENTED IN MEDICAL RECORD: ICD-10-PCS | Mod: CPTII,,, | Performed by: NURSE PRACTITIONER

## 2023-12-11 PROCEDURE — 3046F HEMOGLOBIN A1C LEVEL >9.0%: CPT | Mod: CPTII,,, | Performed by: NURSE PRACTITIONER

## 2023-12-11 PROCEDURE — 85025 COMPLETE CBC W/AUTO DIFF WBC: CPT | Performed by: NURSE PRACTITIONER

## 2023-12-11 PROCEDURE — 92228 IMG RTA DETC/MNTR DS PHY/QHP: CPT | Mod: 59,TC,PBBFAC,PN | Performed by: NURSE PRACTITIONER

## 2023-12-11 PROCEDURE — 1160F RVW MEDS BY RX/DR IN RCRD: CPT | Mod: CPTII,,, | Performed by: NURSE PRACTITIONER

## 2023-12-11 PROCEDURE — 1160F PR REVIEW ALL MEDS BY PRESCRIBER/CLIN PHARMACIST DOCUMENTED: ICD-10-PCS | Mod: CPTII,,, | Performed by: NURSE PRACTITIONER

## 2023-12-11 RX ORDER — ERGOCALCIFEROL 1.25 MG/1
50000 CAPSULE ORAL
Qty: 8 CAPSULE | Refills: 0 | Status: SHIPPED | OUTPATIENT
Start: 2023-12-11

## 2023-12-11 RX ORDER — LEVOTHYROXINE SODIUM 125 UG/1
TABLET ORAL
Qty: 180 TABLET | Refills: 0 | Status: SHIPPED | OUTPATIENT
Start: 2023-12-11

## 2023-12-11 NOTE — PROGRESS NOTES
Patient Name: Kinza Ford     : 1985    MRN: [id number]     Subjective:     Patient ID: Kinza Ford is a 38 y.o. female.    Chief Complaint:   Chief Complaint   Patient presents with    Follow-up     Pt is here for follow up. Pt c/o facial lesion with changes since 2023. Pt c/o palpable lymph node to left axilla with left nipple tenderness x six weeks         HPI: 23:  Pt is here for follow up. Pt c/o facial lesion with changes since 2023. Pt c/o palpable lymph node to left axilla with left nipple tenderness x six weeks.  Tender under left arm.  Hx thyroid cancer.  No fever, no weight loss.  Comes and goes.  Recently had COVID, strep, flu go through her home in the last 2 months. Left nipple is red and tender.  No discharge or inversion of nipple.  Inflamed and irritated painful swelling to nipple intermittently.        23:  3 month f/u for HLD.  Sees Endocrine for her thyroid issue and diabetes.  Pt stopped taking all of her medications weeks ago due to not feeling well, diarrhea, fatigue, nausea/vomiting.    PAP-Hysterectomy due to Fibroids, last one 5 years ago  MMG-hasnt had one yet    FLP in 23 11:23  Cholesterol: 230 (H); HDL: 39; LDL Cholesterol External: 154.00 (H); Total Cholesterol/HDL Ratio: 6 (H); Triglycerides: 187 (H)  Very Low Density Lipoprotein: 37                  ROS:      Review of Systems   Constitutional: Negative.    HENT: Negative.     Eyes: Negative.    Respiratory: Negative.     Cardiovascular: Negative.    Gastrointestinal: Negative.    Genitourinary: Negative.    Musculoskeletal: Negative.    Skin: Negative.         +hyperpigmented lesions under both eyes and all over upper back   Neurological: Negative.    Endo/Heme/Allergies: Negative.    Psychiatric/Behavioral: Negative.     All other systems reviewed and are negative.           History:     Past Medical History:   Diagnosis Date    Acquired hypothyroidism     Allergy     DM2  (diabetes mellitus, type 2)     Hx of papillary thyroid carcinoma     IBS (irritable bowel syndrome)     Migraines     Mixed hyperlipidemia     Uncontrolled type 2 diabetes mellitus with hyperglycemia 2023        Past Surgical History:   Procedure Laterality Date    ADENOIDECTOMY  2014     SECTION      COLONOSCOPY      ESOPHAGOGASTRODUODENOSCOPY      HYSTERECTOMY  2021    HYSTERECTOMY, TOTAL, VAGINAL, WITH CYSTOSCOPY  2021    MODIFIED [facility] CULDOPLASTY    THYROIDECTOMY  2021    TOTAL 2/2 PAPILLARY THRYROID CARCINOMA    TONSILLECTOMY  2008    ADENOIDECTOMY    TRIGGER FINGER RELEASE Right 2023    Procedure: RELEASE, TRIGGER FINGER, RING;  Surgeon: Hang Casas MD;  Location: [facility];  Service: Plastics;  Laterality: Right;    TUBAL LIGATION  2018    URETERAL STENT PLACEMENT Right 2008    WISDOM TOOTH EXTRACTION         Family History   Problem Relation Age of Onset    No Known Problems Mother     Diabetes Father     Crohn's disease Father     Diabetes Mellitus Father     Diabetes Maternal Aunt     Cancer Paternal Uncle         Social History     Tobacco Use    Smoking status: Never    Smokeless tobacco: Never   Substance and Sexual Activity    Alcohol use: Not Currently     Comment: once a month    Drug use: Never    Sexual activity: Yes     Partners: Male     Birth control/protection: See Surgical Hx     Comment: Full hysterectomy       Current Outpatient Medications   Medication Instructions    albuterol (PROVENTIL/VENTOLIN HFA) 90 mcg/actuation inhaler 1 puff, Every 6 hours PRN, As needed for wheezing.     azelastine (ASTELIN) 137 mcg, Nasal, 2 times daily    blood sugar diagnostic Strp 1 each, Misc.(Non-Drug; Combo Route), 3 times daily    carbamide peroxide (DEBROX) 6.5 % otic solution 5 drops, Right Ear, 2 times daily    cetirizine (ZYRTEC) 10 MG tablet one tablet    cholecalciferol (vitamin D3) 50,000 Units, Oral, Every 7 days    EPINEPHrine  "(EPIPEN) 0.3 mg/0.3 mL AtIn SMARTSI Pre-Filled Pen Syringe IM Once    lancets (ACCU-CHEK SOFTCLIX LANCETS) Misc 1 each, Misc.(Non-Drug; Combo Route), 3 times daily    levothyroxine (SYNTHROID) 224 mcg, Oral, Before breakfast    metFORMIN (GLUCOPHAGE-XR) 1,000 mg, Oral, Nightly    pioglitazone (ACTOS) 30 mg, Oral, Daily    TRADJENTA 5 mg, Oral, Daily        Review of patient's allergies indicates:   Allergen Reactions    Adhesive      Other reaction(s): Burn    Sulfa (sulfonamide antibiotics)      Other reaction(s): Unknown    Sulfamethoxazole      Other reaction(s): Rash    Ozempic [semaglutide] Nausea And Vomiting    Trulicity [dulaglutide] Nausea And Vomiting       Objective:     Visit Vitals  /76 (BP Location: Left arm, Patient Position: Sitting, BP Method: Large (Automatic))   Pulse 79   Temp 97.9 °F (36.6 °C) (Oral)   Resp 18   Ht 5' 4" (1.626 m)   Wt 88.5 kg (195 lb)   SpO2 99%   BMI 33.47 kg/m²       Physical Examination:     Physical Exam  Vitals and nursing note reviewed.   HENT:      Head: Normocephalic and atraumatic.   Cardiovascular:      Rate and Rhythm: Normal rate and regular rhythm.      Pulses: Normal pulses.      Heart sounds: Normal heart sounds.   Pulmonary:      Breath sounds: Normal breath sounds.   Chest:          Comments: Axillary lump felt by patient, not present on today's visit, it comes and goes, left nipple erythema and pain when palpating, no palpable lumps felt on exam.  Musculoskeletal:         General: Normal range of motion.      Cervical back: Normal range of motion.   Skin:     General: Skin is warm and dry.      Capillary Refill: Capillary refill takes less than 2 seconds.      Findings: Lesion present.      Comments: Multiple hyperpigmented lesions under bilateral eyes  Multiple hyperpigmented lesions on upper back     Neurological:      General: No focal deficit present.      Mental Status: She is alert and oriented to person, place, and time.   Psychiatric:         " Mood and Affect: Mood normal.         Lab Results:     Chemistry:  Lab Results   Component Value Date     12/06/2023    K 3.9 12/06/2023    CHLORIDE 102 12/06/2023    BUN 14.4 12/06/2023    CREATININE 0.93 12/06/2023    EGFRNORACEVR >60 12/06/2023    GLUCOSE 285 (H) 12/06/2023    CALCIUM 9.7 12/06/2023    ALKPHOS 70 12/06/2023    LABPROT 8.0 12/06/2023    ALBUMIN 4.1 12/06/2023    BILIDIR 0.1 03/21/2022    IBILI 0.30 03/21/2022    AST 13 12/06/2023    ALT 30 12/06/2023    PHOS 3.2 05/24/2023    [id number] 27.8 (L) 12/06/2023    TSH 8.651 (H) 12/06/2023    MLTMQU2UAZT 0.98 12/06/2023        Lab Results   Component Value Date    HGBA1C 6.4 05/24/2023        Hematology:  Lab Results   Component Value Date    WBC 8.94 12/11/2023    HGB 14.5 12/11/2023    HCT 42.0 12/11/2023     12/11/2023       Lipid Panel:  Lab Results   Component Value Date    CHOL 199 07/06/2023    HDL 43 07/06/2023    .00 07/06/2023    TRIG 101 07/06/2023    TOTALCHOLEST 5 07/06/2023        Urine:  Lab Results   Component Value Date    COLORUA Light-Yellow 12/27/2022    APPEARANCEUA Clear 12/27/2022    SGUA 1.042 12/27/2022    PHUA 6.0 12/27/2022    PROTEINUA Negative 12/27/2022    GLUCOSEUA 4+ (A) 12/27/2022    KETONESUA Trace (A) 12/27/2022    BLOODUA Negative 12/27/2022    NITRITESUA Negative 12/27/2022    LEUKOCYTESUR Negative 12/27/2022    RBCUA 0-5 12/27/2022    WBCUA 0-5 12/27/2022    BACTERIA None Seen 12/27/2022    SQEPUA Moderate (A) 12/27/2022    HYALINECASTS None Seen 12/27/2022    CREATRANDUR 147.5 (H) 06/28/2023        Assessment:          ICD-10-CM ICD-9-CM   1. Mild intermittent asthma without complication  J45.20 493.90   2. Adjustment disorder with mixed anxiety and depressed mood  F43.23 309.28   3. Mixed hyperlipidemia  E78.2 272.2   4. History of thyroidectomy  E89.0 246.8   5. Vitamin D deficiency  E55.9 268.9   6. Steatosis of liver  K76.0 571.8   7. Axillary lump, left  R22.32 782.2   8. Change in  multiple pigmented skin lesions  L81.9 709.00   9. Statin intolerance  Z78.9 995.27   10. Postsurgical hypothyroidism  E89.0 244.0   11. Type 2 diabetes mellitus without complication, without long-term current use of insulin  E11.9 250.00        Plan:     1. Mild intermittent asthma without complication  Assessment & Plan:  Chronic, stable. No recent flares.       2. Adjustment disorder with mixed anxiety and depressed mood  Assessment & Plan:  Followed by Dr. Tillman, continue all appt and meds as directed  ER for SI/HI      3. Mixed hyperlipidemia  Overview:  Lab Results   Component Value Date    CHOL 199 07/06/2023    CHOL 230 (H) 01/18/2023     Lab Results   Component Value Date    HDL 43 07/06/2023    HDL 39 01/18/2023     No results found for: LDLCALC  Lab Results   Component Value Date    TRIG 101 07/06/2023    TRIG 187 (H) 01/18/2023     No results found for: CHOLHDL;    Assessment & Plan:  Chronic, stable  Stopped lipitor-interolerance  Stressed importance of dietary modifications. Follow a low cholesterol, low saturated fat diet with less that 200mg of cholesterol a day.  Avoid fried foods and high saturated fats (high saturated fats less than 7% of calories).  Add Flax Seed/Fish Oil supplements to diet. Increase dietary fiber.  Regular exercise can reduce LDL and raise HDL. Stressed importance of physical activity 5 times per week for 30 minutes per day.       4. History of thyroidectomy    5. Vitamin D deficiency  Assessment & Plan:  Educated on increasing foods high in Vitamin D such as fish oil, cod liver oil, salmon, milk fortified with vitamin D.  RX Vitamin D3 08061 IU weekly x 12 weeks.  Complete entire 12 weeks of Vitamin D prescription.  After completion of prescription (12 weeks/3 months), begin taking Vitamin D 2000 I.U. tablets daily (purchase over the counter).  Repeat Vitamin D level as ordered.         6. Steatosis of liver  Assessment & Plan:  Diet and exercise recommended         7.  Axillary lump, left  -     Mammo Digital Screening Bilat; Future; Expected date: 12/11/2023  -     CBC Auto Differential    8. Change in multiple pigmented skin lesions  -     Ambulatory referral/consult to Family Practice; Future; Expected date: 12/18/2023    9. Statin intolerance  Assessment & Plan:  Unable to tolerate statin therapy      10. Postsurgical hypothyroidism  Overview:  Lab Results   Component Value Date    TSH 8.651 (H) 12/06/2023         Assessment & Plan:  Chronic, managed by Endo, worsening      11. Type 2 diabetes mellitus without complication, without long-term current use of insulin  Assessment & Plan:  Managed by Endo  Encouraged ACE/ARB/Statin according to guidelines.  Follow ADA Diet. Avoid soda, simple sweets, and limit rice/pasta/breads/starches.  Maintain healthy weight with goal BMI <30. Exercise 5 times per week for 30 minutes per day.  Stressed importance of daily foot exams.  Stressed importance of annual dilated eye exam.  Last foot exam: 6/28/23  Last eye exam: 6/28/23  Last micro albumin: 6/28/23    Orders:  -     Diabetic Eye Screening Photo         Follow up in about 6 months (around 6/11/2024) for Wellness.    Future Appointments   Date Time Provider Department Center   12/13/2023  2:30 PM [facility] [id number] SCR[facility] RIZWANA [place]   12/20/2023  9:00 AM [address] [place]    2/5/2024 10:30 AM PROVIDERS, SHAMIKA OPHCHERYL [place]   2/8/2024  7:40 AM RESIDENTS, [facility] ENDOCRINOLOGY [facility] ARELY Gonzalez   6/11/2024  8:45 AM Fely Washington FNP [facility]   7/23/2024  8:40 AM RESIDENTS, [facility] ENDOCRINOLOGY [facility] CARSON Bar

## 2023-12-11 NOTE — ASSESSMENT & PLAN NOTE
Managed by Endo  Encouraged ACE/ARB/Statin according to guidelines.  Follow ADA Diet. Avoid soda, simple sweets, and limit rice/pasta/breads/starches.  Maintain healthy weight with goal BMI <30. Exercise 5 times per week for 30 minutes per day.  Stressed importance of daily foot exams.  Stressed importance of annual dilated eye exam.  Last foot exam: 6/28/23  Last eye exam: 6/28/23  Last micro albumin: 6/28/23

## 2023-12-11 NOTE — TELEPHONE ENCOUNTER
Labs are back, u/s is pending.  While awaiting results, recommend pt proceed w/ plan for DM as per visit, erx ergocalciferol 50,000 IU po weekly, #8, no refills for the vit d, and erx increased LT4 two 125mcg po daily #180 no refills to start increased dose from two 112.

## 2023-12-11 NOTE — ASSESSMENT & PLAN NOTE
Educated on increasing foods high in Vitamin D such as fish oil, cod liver oil, salmon, milk fortified with vitamin D.  RX Vitamin D3 51096 IU weekly x 12 weeks.  Complete entire 12 weeks of Vitamin D prescription.  After completion of prescription (12 weeks/3 months), begin taking Vitamin D 2000 I.U. tablets daily (purchase over the counter).  Repeat Vitamin D level as ordered.

## 2023-12-11 NOTE — ASSESSMENT & PLAN NOTE
Chronic, stable  Stopped lipitor-interolerance  Stressed importance of dietary modifications. Follow a low cholesterol, low saturated fat diet with less that 200mg of cholesterol a day.  Avoid fried foods and high saturated fats (high saturated fats less than 7% of calories).  Add Flax Seed/Fish Oil supplements to diet. Increase dietary fiber.  Regular exercise can reduce LDL and raise HDL. Stressed importance of physical activity 5 times per week for 30 minutes per day.

## 2023-12-12 NOTE — PROGRESS NOTES
Kinza Ford is a 38 y.o. female here for a diabetic eye screening with non-dilated fundus photos per CARSON Ramsay.    Patient cooperative?: Yes  Small pupils?: No  Last eye exam:     For exam results, see Encounter Report.

## 2023-12-19 ENCOUNTER — HOSPITAL ENCOUNTER (OUTPATIENT)
Dept: RADIOLOGY | Facility: HOSPITAL | Age: 38
Discharge: HOME | End: 2023-12-19
Attending: NURSE PRACTITIONER
Payer: MEDICAID

## 2023-12-19 DIAGNOSIS — R22.32 AXILLARY LUMP, LEFT: ICD-10-CM

## 2023-12-19 PROCEDURE — 77062 MAMMO DIGITAL DIAGNOSTIC BILAT WITH TOMO: ICD-10-PCS | Mod: 26,,, | Performed by: RADIOLOGY

## 2023-12-19 PROCEDURE — 77062 BREAST TOMOSYNTHESIS BI: CPT | Mod: 26,,, | Performed by: RADIOLOGY

## 2023-12-19 PROCEDURE — 77066 MAMMO DIGITAL DIAGNOSTIC BILAT WITH TOMO: ICD-10-PCS | Mod: 26,,, | Performed by: RADIOLOGY

## 2023-12-19 PROCEDURE — 76642 ULTRASOUND BREAST LIMITED: CPT | Mod: TC,50

## 2023-12-19 PROCEDURE — 77066 DX MAMMO INCL CAD BI: CPT | Mod: 26,,, | Performed by: RADIOLOGY

## 2023-12-19 PROCEDURE — 76642 ULTRASOUND BREAST LIMITED: CPT | Mod: 26,50,, | Performed by: RADIOLOGY

## 2023-12-19 PROCEDURE — 77062 BREAST TOMOSYNTHESIS BI: CPT | Mod: TC

## 2023-12-19 PROCEDURE — 76642 US BREAST BILATERAL LIMITED: ICD-10-PCS | Mod: 26,50,, | Performed by: RADIOLOGY

## 2024-01-08 ENCOUNTER — HOSPITAL ENCOUNTER (OUTPATIENT)
Dept: RADIOLOGY | Facility: HOSPITAL | Age: 39
Discharge: HOME | End: 2024-01-08
Attending: STUDENT IN AN ORGANIZED HEALTH CARE EDUCATION/TRAINING PROGRAM
Payer: MEDICAID

## 2024-01-08 DIAGNOSIS — C73 THYROID CANCER: ICD-10-CM

## 2024-01-08 PROCEDURE — 76536 US EXAM OF HEAD AND NECK: CPT | Mod: TC

## 2024-01-08 NOTE — TELEPHONE ENCOUNTER
U/s did not show any thyroid tissue in her neck.    Continue with recent med changes made.    Keep f/u as booked next month.    Labs prior to regroup and further discuss including fructosamine, TSH, vit d.

## 2024-01-12 NOTE — TELEPHONE ENCOUNTER
Pt did not read portal message, called and spoke with pt, notified about results and plan, pt voiced understanding.

## 2024-01-15 ENCOUNTER — OFFICE VISIT (OUTPATIENT)
Dept: URGENT CARE | Facility: CLINIC | Age: 39
End: 2024-01-15
Payer: MEDICAID

## 2024-01-15 VITALS
HEIGHT: 64 IN | RESPIRATION RATE: 20 BRPM | BODY MASS INDEX: 32.61 KG/M2 | SYSTOLIC BLOOD PRESSURE: 118 MMHG | OXYGEN SATURATION: 100 % | DIASTOLIC BLOOD PRESSURE: 78 MMHG | WEIGHT: 191 LBS | TEMPERATURE: 98 F | HEART RATE: 92 BPM

## 2024-01-15 DIAGNOSIS — J32.9 SINUSITIS, UNSPECIFIED CHRONICITY, UNSPECIFIED LOCATION: Primary | ICD-10-CM

## 2024-01-15 PROCEDURE — 99213 OFFICE O/P EST LOW 20 MIN: CPT | Mod: S$PBB,,,

## 2024-01-15 PROCEDURE — 99215 OFFICE O/P EST HI 40 MIN: CPT | Mod: PBBFAC

## 2024-01-15 RX ORDER — AMOXICILLIN AND CLAVULANATE POTASSIUM 875; 125 MG/1; MG/1
1 TABLET, FILM COATED ORAL EVERY 12 HOURS
Qty: 14 TABLET | Refills: 0 | Status: SHIPPED | OUTPATIENT
Start: 2024-01-15 | End: 2024-01-22

## 2024-01-15 NOTE — PROGRESS NOTES
"Subjective:       Patient ID: Kinza Ford is a 38 y.o. female.    Vitals:  height is 5' 4" (1.626 m) and weight is 86.6 kg (191 lb). Her oral temperature is 98.4 °F (36.9 °C). Her blood pressure is 118/78 and her pulse is 92. Her respiration is 20 and oxygen saturation is 100%.     Chief Complaint: Sinus Problem (Post nasal drip and productive cough. Patient reports coughing up green phlegm. Symptoms x 1 month. )    38-year-old white female presents to clinic alone.  Reports sinus congestion and cough since Christmas 2023.  Reports waxing and waning symptoms, history of recurrent sinus infections.     Sinus Problem  Associated symptoms include congestion and coughing. Pertinent negatives include no shortness of breath.       Constitution: Negative for fever.   HENT:  Positive for congestion and postnasal drip.    Respiratory:  Positive for cough and sputum production. Negative for shortness of breath and asthma.         Thick green sputum    Allergic/Immunologic: Positive for recurrent sinus infections. Negative for asthma.       Objective:      Physical Exam   Constitutional: She is oriented to person, place, and time. She is cooperative. She is easily aroused. She does not appear ill. No distress. awake  HENT:   Head: Normocephalic and atraumatic.   Ears:   Right Ear: Tympanic membrane normal.   Left Ear: Tympanic membrane normal.   Nose: Mucosal edema present. Right sinus exhibits maxillary sinus tenderness and frontal sinus tenderness. Left sinus exhibits maxillary sinus tenderness and frontal sinus tenderness.   Mouth/Throat: Uvula is midline, oropharynx is clear and moist and mucous membranes are normal. Tonsils are 0 on the right. Tonsils are 0 on the left.   Eyes: Conjunctivae and lids are normal. periorbital hyperpigmentation   Neck: Neck supple.   Cardiovascular: Normal rate, regular rhythm, S1 normal, S2 normal and normal heart sounds.      Comments: Apical 95 BPM    Pulmonary/Chest: Effort " normal and breath sounds normal.   Abdominal: Normal appearance.   Lymphadenopathy:     She has no cervical adenopathy.   Neurological: She is alert, oriented to person, place, and time and easily aroused. GCS eye subscore is 4. GCS verbal subscore is 5. GCS motor subscore is 6.   Skin: Skin is warm, dry and intact. Capillary refill takes less than 2 seconds.   Psychiatric: Her behavior is normal.   Nursing note and vitals reviewed.        Assessment:       1. Sinusitis, unspecified chronicity, unspecified location          Plan:     Symptoms consistent with sinusitis, we will prescribe 7 days of Augmentin.  Instructed patient to participate in saline rinses with sterile water.  Follow up with established ENT clinic in 7 days if symptoms have not resolved.    Sinusitis, unspecified chronicity, unspecified location  -     amoxicillin-clavulanate 875-125mg (AUGMENTIN) 875-125 mg per tablet; Take 1 tablet by mouth every 12 (twelve) hours. for 7 days  Dispense: 14 tablet; Refill: 0

## 2024-02-05 ENCOUNTER — OFFICE VISIT (OUTPATIENT)
Dept: URGENT CARE | Facility: CLINIC | Age: 39
End: 2024-02-05
Payer: MEDICAID

## 2024-02-05 VITALS
HEIGHT: 65 IN | OXYGEN SATURATION: 97 % | DIASTOLIC BLOOD PRESSURE: 69 MMHG | HEART RATE: 104 BPM | SYSTOLIC BLOOD PRESSURE: 95 MMHG | BODY MASS INDEX: 31.67 KG/M2 | TEMPERATURE: 99 F | RESPIRATION RATE: 20 BRPM | WEIGHT: 190.06 LBS

## 2024-02-05 DIAGNOSIS — J02.9 SORE THROAT: ICD-10-CM

## 2024-02-05 DIAGNOSIS — J11.1 INFLUENZA: Primary | ICD-10-CM

## 2024-02-05 DIAGNOSIS — R05.9 COUGH, UNSPECIFIED TYPE: ICD-10-CM

## 2024-02-05 LAB
CTP QC/QA: YES
MOLECULAR STREP A: NEGATIVE
POC MOLECULAR INFLUENZA A AGN: NEGATIVE
POC MOLECULAR INFLUENZA B AGN: POSITIVE
SARS-COV-2 RDRP RESP QL NAA+PROBE: NEGATIVE

## 2024-02-05 PROCEDURE — 99215 OFFICE O/P EST HI 40 MIN: CPT | Mod: PBBFAC | Performed by: FAMILY MEDICINE

## 2024-02-05 PROCEDURE — 87502 INFLUENZA DNA AMP PROBE: CPT | Mod: PBBFAC | Performed by: FAMILY MEDICINE

## 2024-02-05 PROCEDURE — 87651 STREP A DNA AMP PROBE: CPT | Mod: PBBFAC | Performed by: FAMILY MEDICINE

## 2024-02-05 PROCEDURE — 87635 SARS-COV-2 COVID-19 AMP PRB: CPT | Mod: PBBFAC | Performed by: FAMILY MEDICINE

## 2024-02-05 PROCEDURE — 99213 OFFICE O/P EST LOW 20 MIN: CPT | Mod: S$PBB,,, | Performed by: FAMILY MEDICINE

## 2024-02-05 RX ORDER — PROMETHAZINE HYDROCHLORIDE AND DEXTROMETHORPHAN HYDROBROMIDE 6.25; 15 MG/5ML; MG/5ML
5 SYRUP ORAL
Qty: 120 ML | Refills: 0 | Status: SHIPPED | OUTPATIENT
Start: 2024-02-05

## 2024-02-05 RX ORDER — OSELTAMIVIR PHOSPHATE 75 MG/1
75 CAPSULE ORAL 2 TIMES DAILY
Qty: 10 CAPSULE | Refills: 0 | Status: SHIPPED | OUTPATIENT
Start: 2024-02-05 | End: 2024-02-10

## 2024-02-05 RX ORDER — ATORVASTATIN CALCIUM 10 MG/1
10 TABLET, FILM COATED ORAL
COMMUNITY
Start: 2024-01-07 | End: 2024-04-10 | Stop reason: SDUPTHER

## 2024-02-05 NOTE — LETTER
February 5, 2024      [facility]  [facility] [address]  Phone: [phone]       Patient: Kinza Ford   YOB: 1985  Date of Visit: 02/05/2024    To Whom It May Concern:    Elijah Ford  was at [facility] on 02/05/2024. The patient may return to work/school on FEB 8 2024 with no restrictions. If you have any questions or concerns, or if I can be of further assistance, please do not hesitate to contact me.    Sincerely,    VINOD CHANDLER MD

## 2024-02-05 NOTE — PROGRESS NOTES
"Subjective:       Patient ID: Kinza Ford is a 38 y.o. female.    Vitals:  height is 5' 5" (1.651 m) and weight is 86.2 kg (190 lb 0.6 oz). Her temperature is 98.6 °F (37 °C). Her blood pressure is 95/69 and her pulse is 104. Her respiration is 20 and oxygen saturation is 97%.     Chief Complaint: URI (Cough, sore throat x4days)    Patient states symptoms for 2-3 days    URI   Associated symptoms include chest pain (Only with cough), coughing, rhinorrhea and a sore throat. Pertinent negatives include no abdominal pain, joint swelling, neck pain, rash, swollen glands, vomiting or wheezing.         HENT:  Positive for sore throat.    Neck: Negative for neck pain.   Cardiovascular:  Positive for chest pain (Only with cough).   Respiratory:  Positive for cough. Negative for wheezing.    Gastrointestinal:  Negative for abdominal pain and vomiting.   Skin:  Negative for rash.       Objective:   Physical Exam   Constitutional: She appears well-developed.  Non-toxic appearance. She does not appear ill. No distress.   HENT:   Head: Atraumatic.   Nose: No purulent discharge. Right sinus exhibits no maxillary sinus tenderness and no frontal sinus tenderness. Left sinus exhibits no maxillary sinus tenderness and no frontal sinus tenderness.   Mouth/Throat: Uvula is midline.   Eyes: Right eye exhibits no discharge. Left eye exhibits no discharge. Extraocular movement intact   Neck: Neck supple. No neck rigidity present.   Cardiovascular: Regular rhythm.   Pulmonary/Chest: Effort normal and breath sounds normal. No respiratory distress. She has no wheezes. She has no rales.   Lymphadenopathy:     She has no cervical adenopathy.   Neurological: She is alert.   Skin: Skin is warm, dry and not diaphoretic.   Psychiatric: Her behavior is normal.   Nursing note and vitals reviewed.    Results for orders placed or performed in visit on 02/05/24   POCT COVID-19 Rapid Screening   Result Value Ref Range    POC Rapid COVID " Negative Negative     Acceptable Yes    POCT Influenza A/B Molecular   Result Value Ref Range    POC Molecular Influenza A Ag Negative Negative, Not Reported    POC Molecular Influenza B Ag Positive (A) Negative, Not Reported     Acceptable Yes    POCT Strep A, Molecular   Result Value Ref Range    Molecular Strep A, POC Negative Negative     Acceptable Yes          Assessment:     1. Influenza    2. Cough, unspecified type    3. Sore throat          Plan:   Although maybe out of the 48 hour window, patient would like Tamiflu.  Also Phenergan DM with side effect precautions.  Discussed contagious precautions, work excuse.    Influenza  -     promethazine-dextromethorphan (PROMETHAZINE-DM) 6.25-15 mg/5 mL Syrp; Take 5 mLs by mouth every 6 to 8 hours as needed (cough). May cause sedation.  Do not drive or operate heavy machinery.  Dispense: 120 mL; Refill: 0  -     oseltamivir (TAMIFLU) 75 MG capsule; Take 1 capsule (75 mg total) by mouth 2 (two) times daily. for 5 days  Dispense: 10 capsule; Refill: 0    Cough, unspecified type  -     POCT COVID-19 Rapid Screening  -     POCT Influenza A/B Molecular  -     POCT Strep A, Molecular    Sore throat  -     POCT COVID-19 Rapid Screening  -     POCT Influenza A/B Molecular  -     POCT Strep A, Molecular        Please note: This chart was completed via voice to text dictation. It may contain typographical/word recognition errors. If there are any questions, please contact the provider for final clarification.

## 2024-02-07 ENCOUNTER — LAB VISIT (OUTPATIENT)
Dept: LAB | Facility: HOSPITAL | Age: 39
End: 2024-02-07
Attending: INTERNAL MEDICINE
Payer: MEDICAID

## 2024-02-07 DIAGNOSIS — E11.9 TYPE 2 DIABETES MELLITUS WITHOUT COMPLICATION, WITHOUT LONG-TERM CURRENT USE OF INSULIN: ICD-10-CM

## 2024-02-07 DIAGNOSIS — C73 THYROID CANCER: ICD-10-CM

## 2024-02-07 DIAGNOSIS — E89.0 POSTSURGICAL HYPOTHYROIDISM: ICD-10-CM

## 2024-02-07 DIAGNOSIS — E55.9 VITAMIN D DEFICIENCY: ICD-10-CM

## 2024-02-07 LAB
ALBUMIN SERPL-MCNC: 3.6 G/DL (ref 3.5–5)
ALBUMIN/GLOB SERPL: 0.8 RATIO (ref 1.1–2)
ALP SERPL-CCNC: 59 UNIT/L (ref 40–150)
ALT SERPL-CCNC: 27 UNIT/L (ref 0–55)
AST SERPL-CCNC: 15 UNIT/L (ref 5–34)
BILIRUB SERPL-MCNC: 0.4 MG/DL
BUN SERPL-MCNC: 16.6 MG/DL (ref 7–18.7)
CALCIUM SERPL-MCNC: 9.4 MG/DL (ref 8.4–10.2)
CHLORIDE SERPL-SCNC: 106 MMOL/L (ref 98–107)
CO2 SERPL-SCNC: 27 MMOL/L (ref 22–29)
CREAT SERPL-MCNC: 0.76 MG/DL (ref 0.55–1.02)
DEPRECATED CALCIDIOL+CALCIFEROL SERPL-MC: 29 NG/ML (ref 30–80)
EST. AVERAGE GLUCOSE BLD GHB EST-MCNC: 188.6 MG/DL
GFR SERPLBLD CREATININE-BSD FMLA CKD-EPI: >60 MLS/MIN/1.73/M2
GLOBULIN SER-MCNC: 4.3 GM/DL (ref 2.4–3.5)
GLUCOSE SERPL-MCNC: 218 MG/DL (ref 74–100)
HBA1C MFR BLD: 8.2 %
POTASSIUM SERPL-SCNC: 4.5 MMOL/L (ref 3.5–5.1)
PROT SERPL-MCNC: 7.9 GM/DL (ref 6.4–8.3)
SODIUM SERPL-SCNC: 141 MMOL/L (ref 136–145)
T4 FREE SERPL-MCNC: 1.89 NG/DL (ref 0.7–1.48)
TSH SERPL-ACNC: <0.008 UIU/ML (ref 0.35–4.94)

## 2024-02-07 PROCEDURE — 84432 ASSAY OF THYROGLOBULIN: CPT

## 2024-02-07 PROCEDURE — 82306 VITAMIN D 25 HYDROXY: CPT

## 2024-02-07 PROCEDURE — 80053 COMPREHEN METABOLIC PANEL: CPT

## 2024-02-07 PROCEDURE — 36415 COLL VENOUS BLD VENIPUNCTURE: CPT

## 2024-02-07 PROCEDURE — 84439 ASSAY OF FREE THYROXINE: CPT

## 2024-02-07 PROCEDURE — 84443 ASSAY THYROID STIM HORMONE: CPT

## 2024-02-07 PROCEDURE — 83036 HEMOGLOBIN GLYCOSYLATED A1C: CPT

## 2024-02-07 PROCEDURE — 82985 ASSAY OF GLYCATED PROTEIN: CPT

## 2024-02-08 ENCOUNTER — TELEPHONE (OUTPATIENT)
Dept: ENDOCRINOLOGY | Facility: CLINIC | Age: 39
End: 2024-02-08
Payer: MEDICAID

## 2024-02-08 LAB — FRUCTOSAMINE SERPL-SCNC: 303 MCMOL/L (ref 200–285)

## 2024-02-08 NOTE — TELEPHONE ENCOUNTER
----- Message from Evelyn Barrios sent at 2/8/2024  7:12 AM CST -----  Stout pt       Pt stated she has the flu 103 temp an was wondering if she can get her results over the phone.       PT #[phone]

## 2024-02-08 NOTE — TELEPHONE ENCOUNTER
Pt was scheduled today, canceled because she has the flu, was r/s for 7/23/2024, pt is asking for lab results.

## 2024-02-09 LAB
ENDOCRINOLOGIST REVIEW: ABNORMAL
THYROGLOB AB SERPL-ACNC: <1.8 IU/ML
THYROGLOB SERPL-MCNC: 0.4 NG/ML

## 2024-02-10 ENCOUNTER — HOSPITAL ENCOUNTER (EMERGENCY)
Facility: HOSPITAL | Age: 39
Discharge: HOME | End: 2024-02-10
Attending: STUDENT IN AN ORGANIZED HEALTH CARE EDUCATION/TRAINING PROGRAM
Payer: MEDICAID

## 2024-02-10 VITALS
SYSTOLIC BLOOD PRESSURE: 110 MMHG | HEIGHT: 65 IN | BODY MASS INDEX: 32.49 KG/M2 | TEMPERATURE: 98 F | DIASTOLIC BLOOD PRESSURE: 68 MMHG | OXYGEN SATURATION: 98 % | WEIGHT: 195 LBS | HEART RATE: 100 BPM | RESPIRATION RATE: 16 BRPM

## 2024-02-10 DIAGNOSIS — R05.9 COUGH: ICD-10-CM

## 2024-02-10 DIAGNOSIS — B34.9 VIRAL SYNDROME: ICD-10-CM

## 2024-02-10 DIAGNOSIS — J10.1 INFLUENZA B: ICD-10-CM

## 2024-02-10 DIAGNOSIS — J06.9 VIRAL URI: Primary | ICD-10-CM

## 2024-02-10 LAB
FLUAV AG UPPER RESP QL IA.RAPID: NOT DETECTED
FLUBV AG UPPER RESP QL IA.RAPID: DETECTED
SARS-COV-2 RNA RESP QL NAA+PROBE: NOT DETECTED

## 2024-02-10 PROCEDURE — 99284 EMERGENCY DEPT VISIT MOD MDM: CPT | Mod: 25

## 2024-02-10 PROCEDURE — 0240U COVID/FLU A&B PCR: CPT | Performed by: EMERGENCY MEDICINE

## 2024-02-10 RX ORDER — FLUTICASONE PROPIONATE 50 MCG
1 SPRAY, SUSPENSION (ML) NASAL 2 TIMES DAILY PRN
Qty: 15 G | Refills: 0 | Status: SHIPPED | OUTPATIENT
Start: 2024-02-10 | End: 2024-03-11

## 2024-02-10 RX ORDER — ALBUTEROL SULFATE 90 UG/1
1 AEROSOL, METERED RESPIRATORY (INHALATION) EVERY 6 HOURS PRN
Qty: 18 G | Refills: 0 | Status: SHIPPED | OUTPATIENT
Start: 2024-02-10 | End: 2024-03-11

## 2024-02-10 NOTE — DISCHARGE INSTRUCTIONS
Follow-up with the primary care physician.      Take medications as prescribed.      Return to the emergency department if any new or worsening symptoms, chest pain, shortness of breath, nausea, vomiting, fever, or any other concerns.

## 2024-02-10 NOTE — Clinical Note
"Kinza"Natalya Ford was seen and treated in our emergency department on 2/10/2024.  She may return to work on 02/15/2024.       If you have any questions or concerns, please don't hesitate to call.      KAYY Elliott RN    "

## 2024-02-10 NOTE — TELEPHONE ENCOUNTER
Labs are back and pt rebooked appt.    TSH is low.    Recommend pt reduce from two of the 125mcg tabs po daily to two daily Mon-Sat, only one tab on Sundays.    Rebook f/u into one of the next available open slots at the end of April / beginning of May.    Will sort all results then and regroup on plan moving forward.

## 2024-02-10 NOTE — ED PROVIDER NOTES
Encounter Date: 2/10/2024    SCRIBE #1 NOTE: I, Shea Lyn, am scribing for, and in the presence of,  Rommel Salgado MD. I have scribed the following portions of the note - Other sections scribed: HPI, ROS, PE.       History     Chief Complaint   Patient presents with    Fever     Presents with fever, cough, and nasal congestion - onset 2/ when she was Dx w/ flu.      Patient is a 38-year-old female with a history of DM, HLD, and papillary thyroid cancer s/p thyroidectomy presenting to the ED with c/o fever. Pt reports testing positive for the flu on Monday after experiencing symptoms of fever, myalgia, and cough onset Friday. She notes that she has been taking Tamiflu and promethazine without relief.  States tonight she began having some tachycardia, fever began feeling worse.  Took Tylenol.    The history is provided by the patient. No  was used.     Review of patient's allergies indicates:   Allergen Reactions    Adhesive      Other reaction(s): Burn    Sulfa (sulfonamide antibiotics)      Other reaction(s): Unknown    Sulfamethoxazole      Other reaction(s): Rash    Ozempic [semaglutide] Nausea And Vomiting    Trulicity [dulaglutide] Nausea And Vomiting     Past Medical History:   Diagnosis Date    Acquired hypothyroidism     Allergy     DM2 (diabetes mellitus, type 2)     Hx of papillary thyroid carcinoma     IBS (irritable bowel syndrome)     Migraines     Mixed hyperlipidemia     Uncontrolled type 2 diabetes mellitus with hyperglycemia 2023     Past Surgical History:   Procedure Laterality Date    ADENOIDECTOMY  2014     SECTION      COLONOSCOPY      ESOPHAGOGASTRODUODENOSCOPY      HYSTERECTOMY  2021    HYSTERECTOMY, TOTAL, VAGINAL, WITH CYSTOSCOPY  2021    MODIFIED [employer] CULDOPLASTY    THYROIDECTOMY  2021    TOTAL 2/2 PAPILLARY THRYROID CARCINOMA    TONSILLECTOMY  2008    ADENOIDECTOMY    TRIGGER FINGER RELEASE Right 2023    Procedure:  RELEASE, TRIGGER FINGER, RING;  Surgeon: Hang Casas MD;  Location: [facility];  Service: Plastics;  Laterality: Right;    TUBAL LIGATION  06/29/2018    URETERAL STENT PLACEMENT Right 02/27/2008    WISDOM TOOTH EXTRACTION       Family History   Problem Relation Age of Onset    No Known Problems Mother     Diabetes Father     Crohn's disease Father     Diabetes Mellitus Father     Diabetes Maternal Aunt     Cancer Paternal Uncle      Social History     Tobacco Use    Smoking status: Never    Smokeless tobacco: Never   Substance Use Topics    Alcohol use: Not Currently     Comment: once a month    Drug use: Never     Review of Systems   Constitutional:  Positive for fever.   HENT:  Negative for sore throat.    Eyes:  Negative for visual disturbance.   Respiratory:  Positive for cough. Negative for shortness of breath.    Cardiovascular:  Negative for chest pain.   Gastrointestinal:  Negative for abdominal pain.   Genitourinary:  Negative for dysuria.   Musculoskeletal:  Positive for myalgias. Negative for joint swelling.   Skin:  Negative for rash.   Neurological:  Negative for weakness.   Psychiatric/Behavioral:  Negative for confusion.        Physical Exam     Initial Vitals [02/10/24 0140]   BP Pulse Resp Temp SpO2   111/73 (!) 124 16 98.4 °F (36.9 °C) 99 %      MAP       --         Physical Exam    Nursing note and vitals reviewed.  Constitutional: She appears well-developed and well-nourished.   HENT:   Head: Normocephalic and atraumatic.   Eyes: EOM are normal. Pupils are equal, round, and reactive to light.   Neck:   Normal range of motion.  Cardiovascular:  Regular rhythm, normal heart sounds and intact distal pulses.           No murmur heard.  Tachycardic   Pulmonary/Chest: Breath sounds normal. No respiratory distress. She has no wheezes. She has no rales.   Abdominal: Abdomen is soft. She exhibits no distension. There is no abdominal tenderness. There is no rebound.   Musculoskeletal:         General:  No tenderness or edema. Normal range of motion.      Cervical back: Normal range of motion.     Neurological: She is alert. She has normal strength. No cranial nerve deficit. GCS score is 15. GCS eye subscore is 4. GCS verbal subscore is 5. GCS motor subscore is 6.   Skin: Skin is warm and dry. Capillary refill takes less than 2 seconds. No rash noted. No erythema.   Psychiatric: She has a normal mood and affect.         ED Course   Procedures  Labs Reviewed   COVID/FLU A&B PCR - Abnormal; Notable for the following components:       Result Value    Influenza B PCR Detected (*)     All other components within normal limits    Narrative:     The Xpert Xpress SARS-CoV-2/FLU/RSV plus is a rapid, multiplexed real-time PCR test intended for the simultaneous qualitative detection and differentiation of SARS-CoV-2, Influenza A, Influenza B, and respiratory syncytial virus (RSV) viral RNA in either nasopharyngeal swab or nasal swab specimens.                Imaging Results              X-Ray Chest PA And Lateral (Final result)  Result time 02/10/24 07:55:43      Final result by Ced Malave MD (02/10/24 07:55:43)                   Impression:      No acute cardiopulmonary abnormality.      Electronically signed by: Ced Malave  Date:    02/10/2024  Time:    07:55               Narrative:    EXAMINATION:  XR CHEST PA AND LATERAL    CLINICAL HISTORY:  Cough, unspecified    COMPARISON:  No priors    FINDINGS:  PA and lateral views of the chest were obtained. Heart and mediastinum within normal limits. The lungs are clear. No pneumothorax or significant effusion.                        Wet Read by Rommel Salgado MD (02/10/24 04:06:53, [facility] - Emergency Dept, Emergency Medicine)    No acute findings.                                   X-Rays:   Independently Interpreted Readings:   Chest X-Ray: Normal heart size.  No infiltrates.  No acute abnormalities.     Medications - No data to display  Medical  Decision Making  Problems Addressed:  Cough: acute illness or injury that poses a threat to life or bodily functions  Influenza B: acute illness or injury that poses a threat to life or bodily functions  Viral syndrome: acute illness or injury that poses a threat to life or bodily functions  Viral URI: acute illness or injury that poses a threat to life or bodily functions    Amount and/or Complexity of Data Reviewed  Labs: ordered.  Radiology: ordered and independent interpretation performed.    Risk  OTC drugs.  Prescription drug management.            Scribe Attestation:   Scribe #1: I performed the above scribed service and the documentation accurately describes the services I performed. I attest to the accuracy of the note.    Attending Attestation:           Physician Attestation for Scribe:  Physician Attestation Statement for Scribe #1: I, Rommel Salgado MD, reviewed documentation, as scribed by Shea Lyn in my presence, and it is both accurate and complete.                        Medical Decision Making:   History:   Old Medical Records: I decided to obtain old medical records.  Old Records Summarized: records from clinic visits.       <> Summary of Records: Reviewed old records, diagnosed with influenza few days ago.  Initial Assessment:   Cough, viral URI type symptoms  Differential Diagnosis:   Judging by the patient's chief complaint and pertinent history, the patient has the following possible differential diagnoses, including but not limited to the following.  Some of these are deemed to be lower likelihood and some more likely based on my physical exam and history combined with possible lab work and/or imaging studies.   Please see the pertinent studies, and refer to the HPI.  Some of these diagnoses will take further evaluation to fully rule out, perhaps as an outpatient and the patient was encouraged to follow up when discharged for more comprehensive evaluation.    pneumonia, bronchitis, viral  syndrome, covid, flu, croup, pertussis, copd, asthma, GERD  Clinical Tests:   Lab Tests: Ordered and Reviewed  Radiological Study: Ordered and Reviewed  ED Management:  Patient is a 38-year-old female who presents to the emergency department for cough, tachycardic, fever.  She reports she was diagnosed with influenza few days ago had he was some improvement but began feeling worse today.  She would fever earlier today.  Took Tylenol with improvement.  Chest x-ray without evidence of infiltrate or post viral pneumonia.  There is no lobar consolidation.  No widened mediastinum or pneumothorax.  Her lab work is as noted, positive influenza swab.  Likely symptoms are due to continued influenza infection.  Will continue supportive care.  Will place some patient on albuterol, Flonase, Claritin for symptomatic treatment.  All results discussed with the patient.  On reassessment vital signs stable. Reassessed patient.  Patient is resting comfortably.  Discussed all results.  Discussed need for follow-up.  Discussed return precautions.  Answered all questions at this time.  Hemodynamically stable for continued outpatient management with strict return precautions.  Patient verbalized understanding agreed to plan.               Clinical Impression:  Final diagnoses:  [R05.9] Cough  [J06.9] Viral URI (Primary)  [B34.9] Viral syndrome  [J10.1] Influenza B          ED Disposition Condition    Discharge Stable          ED Prescriptions       Medication Sig Dispense Start Date End Date Auth. Provider    albuterol (PROVENTIL/VENTOLIN HFA) 90 mcg/actuation inhaler Inhale 1 puff into the lungs every 6 (six) hours as needed for Shortness of Breath or Wheezing. As needed for wheezing. 18 g 2/10/2024 3/11/2024 Rommel Salgado MD    benzocaine-menthoL 15-3.6 mg Lozg 1 tablet by Mucous Membrane route 2 (two) times daily as needed (cough, sore throat). 16 lozenge 2/10/2024 2/15/2024 Rommel Salgado MD    fluticasone propionate (FLONASE)  50 mcg/actuation nasal spray 1 spray (50 mcg total) by Each Nostril route 2 (two) times daily as needed for Rhinitis. 15 g 2/10/2024 3/11/2024 Rommel Salgado MD    dextromethorphan-guaiFENesin  mg (MUCINEX DM)  mg per 12 hr tablet Take 1 tablet by mouth 2 (two) times daily as needed. 10 tablet 2/10/2024 2/20/2024 Rommel Salgado MD          Follow-up Information       Follow up With Specialties Details Why Contact Info    Fely Washington, TRACYP Nurse Practitioner   [address] [place] [id number]  [phone]      Primary Care  Call in 1 day  Please call [phone] for a primary care provider.             Rommel Salgado MD  02/10/24 2002

## 2024-03-18 NOTE — TRANSFER OF CARE
"Anesthesia Transfer of Care Note    Patient: Kinza Ford    Procedure(s) Performed: Procedure(s) (LRB):  RELEASE, TRIGGER FINGER, RING (Right)    Patient location: OPS    Anesthesia Type: MAC    Transport from OR: Transported from OR on room air with adequate spontaneous ventilation    Post pain: adequate analgesia    Post assessment: no apparent anesthetic complications    Post vital signs: stable    Level of consciousness: awake and responds to stimulation    Nausea/Vomiting: no nausea/vomiting    Complications: none    Transfer of care protocol was followed      Last vitals:   Visit Vitals  /72   Pulse 72   Temp 36.9 °C (98.4 °F) (Oral)   Ht 5' 4.02" (1.626 m)   Wt 91.4 kg (201 lb 8 oz)   SpO2 97%   BMI 34.57 kg/m²     " no

## 2024-04-10 RX ORDER — ATORVASTATIN CALCIUM 10 MG/1
10 TABLET, FILM COATED ORAL DAILY
Qty: 30 TABLET | Refills: 11 | Status: SHIPPED | OUTPATIENT
Start: 2024-04-10 | End: 2024-05-03 | Stop reason: SDUPTHER

## 2024-05-03 ENCOUNTER — TELEPHONE (OUTPATIENT)
Dept: INTERNAL MEDICINE | Facility: CLINIC | Age: 39
End: 2024-05-03
Payer: MEDICAID

## 2024-05-03 ENCOUNTER — OFFICE VISIT (OUTPATIENT)
Dept: ENDOCRINOLOGY | Facility: CLINIC | Age: 39
End: 2024-05-03
Payer: MEDICAID

## 2024-05-03 VITALS
HEART RATE: 71 BPM | DIASTOLIC BLOOD PRESSURE: 69 MMHG | TEMPERATURE: 98 F | WEIGHT: 194.25 LBS | BODY MASS INDEX: 32.36 KG/M2 | HEIGHT: 65 IN | RESPIRATION RATE: 16 BRPM | SYSTOLIC BLOOD PRESSURE: 106 MMHG

## 2024-05-03 DIAGNOSIS — E89.0 HYPOTHYROIDISM, POSTSURGICAL: ICD-10-CM

## 2024-05-03 DIAGNOSIS — E11.9 TYPE 2 DIABETES MELLITUS WITHOUT COMPLICATION, WITHOUT LONG-TERM CURRENT USE OF INSULIN: ICD-10-CM

## 2024-05-03 DIAGNOSIS — C73 THYROID CANCER: ICD-10-CM

## 2024-05-03 DIAGNOSIS — E11.9 TYPE 2 DIABETES MELLITUS WITHOUT COMPLICATION, WITHOUT LONG-TERM CURRENT USE OF INSULIN: Primary | ICD-10-CM

## 2024-05-03 DIAGNOSIS — E55.9 VITAMIN D DEFICIENCY: ICD-10-CM

## 2024-05-03 LAB — HBA1C MFR BLD: 11.4 %

## 2024-05-03 PROCEDURE — 83036 HEMOGLOBIN GLYCOSYLATED A1C: CPT | Mod: PBBFAC

## 2024-05-03 PROCEDURE — 99214 OFFICE O/P EST MOD 30 MIN: CPT | Mod: PBBFAC

## 2024-05-03 RX ORDER — INSULIN PUMP SYRINGE, 3 ML
EACH MISCELLANEOUS
Qty: 100 EACH | Refills: 11 | Status: SHIPPED | OUTPATIENT
Start: 2024-05-03

## 2024-05-03 RX ORDER — LANCETS
EACH MISCELLANEOUS
Qty: 100 EACH | Refills: 11 | Status: SHIPPED | OUTPATIENT
Start: 2024-05-03

## 2024-05-03 RX ORDER — PEN NEEDLE, DIABETIC 30 GX3/16"
1 NEEDLE, DISPOSABLE MISCELLANEOUS NIGHTLY
Qty: 100 EACH | Refills: 11 | Status: SHIPPED | OUTPATIENT
Start: 2024-05-03

## 2024-05-03 RX ORDER — ATORVASTATIN CALCIUM 10 MG/1
10 TABLET, FILM COATED ORAL DAILY
Qty: 30 TABLET | Refills: 11 | Status: SHIPPED | OUTPATIENT
Start: 2024-05-03

## 2024-05-03 RX ORDER — LEVOTHYROXINE SODIUM 112 UG/1
2 TABLET ORAL DAILY
COMMUNITY
Start: 2024-03-06

## 2024-05-03 RX ORDER — METFORMIN HYDROCHLORIDE 500 MG/1
1000 TABLET, EXTENDED RELEASE ORAL NIGHTLY
Qty: 180 TABLET | Refills: 3 | Status: SHIPPED | OUTPATIENT
Start: 2024-05-03 | End: 2025-05-03

## 2024-05-03 RX ORDER — PIOGLITAZONEHYDROCHLORIDE 30 MG/1
30 TABLET ORAL DAILY
Qty: 90 TABLET | Refills: 3 | Status: SHIPPED | OUTPATIENT
Start: 2024-05-03 | End: 2025-05-03

## 2024-05-03 RX ORDER — INSULIN GLARGINE 100 [IU]/ML
10 INJECTION, SOLUTION SUBCUTANEOUS NIGHTLY
Qty: 9 ML | Refills: 3 | Status: SHIPPED | OUTPATIENT
Start: 2024-05-03 | End: 2024-05-04

## 2024-05-03 RX ORDER — LINAGLIPTIN 5 MG/1
5 TABLET, FILM COATED ORAL DAILY
Qty: 90 TABLET | Refills: 3 | Status: SHIPPED | OUTPATIENT
Start: 2024-05-03 | End: 2025-05-03

## 2024-05-03 RX ORDER — ASPIRIN 325 MG
50000 TABLET, DELAYED RELEASE (ENTERIC COATED) ORAL
Qty: 8 CAPSULE | Refills: 1 | Status: SHIPPED | OUTPATIENT
Start: 2024-05-03

## 2024-05-03 NOTE — TELEPHONE ENCOUNTER
MESSAGE TO PRESCRIBER:  (Cm)    Verify dose for Lantus Solostar since pt is increasing 1 unit every night.  Please verify max daily unit dose.      mC  Ph:  [phone]

## 2024-05-03 NOTE — PROGRESS NOTES
[facility] Endocrinology Clinic Visit    Chief Complaint:      Diabetes and Thyroid Cancer     Subjective:     HPI:  Kinza Ford is a 38 y.o. female who presents to Endocrinology clinic today for Diabetes and Thyroid Cancer.  History of papillary thyroid cancer diagnosed in July 2021 and had a thyroidectomy in August 2021 without radiation, had slight up trend in thyroglobulin making her intermediate risk for recurrence.  Patient referred to endocrinology clinic by ENT for further evaluation of her thyroid cancer surveillance.  Patient had an elevated globulin tumor marker level of 4.8 in November 2022.  She had repeat ultrasound of the neck showing mild scarring at site of thyroidectomy.  CT of the neck showed no pathology, no acute pathology disease at the neck.      (9/19/23): At last office visit, patient's levothyroxine dosage was increased from 200 to 224 mcg daily; however, she only started taking increased dosage approximately 1 week ago and was unable to have repeat labs drawn prior to today's visit due to rescheduling of her appointment. With regards to her DM, at last OV, trulicity was stopped due to GI side effects. However, patient reports that she also opted to decrease her metformin from BID to daily, and she stopped her pioglitazone 15 daily, farxiga 10 daily altogether and did not fill prescription for tradjenta. She alsostopped checking her cbgs reportedly due to issues with insurance coverage. Denies any weight gain, fatigue , diarrhea, polyuria, polydipsia at this time.     (12/6/23):  S visit; patient reports feeling better overall though states she is had occupational challenges to eating/taking medications appropriately. This situation has since improved though had to unfortunately self discontinue previously prescribed Mounjoro after taking a proximally x3 doses total due to significant GI symptoms (nausea, vomiting, diarrhea, abdominal cramping course).  Patient states this occurred  after also taking Trulicity and Ozempic in the past in his care to take this medication diet moving forward.  Last dose of majora a proximally Thanksgiving.  We discussed restarting previously prescribed Tradjenta & Pioglitazone, and uptitrating Metformin to 1000mg BID own in which she was amenable.  Has not taken CBG readings in multple months. In regards to her previous thyroid cancer s/p thyroidectomy and subsequent hypo thyroidism, she reports close compliance with Synthroid 244 mcg q.d..  She does endorse symptoms such as anxiety, heat intolerance, and episodes of intermittent diaphoresis.  She denies any difficulty with sleep, tachycardia, palpitations, diarrhea, unexpected weight loss.  Denies any new palpable nodules the previous surgical site or regional adenopathy.     5/3/2024: today pt notes she has been unable to rememebr to take medications due to having a hectic schedule at home.  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  She currently still works as a  and she has not able to miss any of those days.  She reports that she works roughly 19 hours a day and this has been persistent for the last several months.  Due to this she forgets to take her diabetic medications however she does often remember to take her levothyroxine due to taking it at different time.  She reports that over the last week she has been able to remembers to start taking her diabetic medications.  She denies any weight loss, she does have polydipsia, no polyphagia, there is some polyuria.  She does not feel fatigued or sick.  She reports that she has had no palpitations, nausea, vomiting, diarrhea.  She has previously been on GLP ones in the past and did not tolerate them due to what she believes was pancreatitis, she had been admitted to the hospital when on these medications.  She is supposed to be taking pioglitazone, metformin and Tradjenta.  She reports that she often is taking those  "medications at night instead of the Actos in the morning as recommended.    Review of Systems  Review of Systems   Constitutional:  Negative for chills, diaphoresis, fever, malaise/fatigue and weight loss.   Respiratory:  Negative for cough, hemoptysis, sputum production, shortness of breath and wheezing.    Cardiovascular:  Negative for chest pain, palpitations, orthopnea, claudication, leg swelling and PND.   Gastrointestinal:  Negative for abdominal pain, blood in stool, constipation, diarrhea, heartburn, melena, nausea and vomiting.   Genitourinary:  Negative for dysuria, flank pain, frequency, hematuria and urgency.   Skin:  Negative for itching and rash.   Neurological:  Negative for focal weakness and headaches.       Objective:   Last 24 Hour Vital Signs:  Vitals  BP: 106/69  Temp: 97.7 °F (36.5 °C)  Temp Source: Oral  Pulse: 71  Resp: 16  Height: 5' 5" (165.1 cm)  Weight: 88.1 kg (194 lb 3.6 oz)    Physical Examination:  General: well developed; pleasant and cooperative in no acute distress  HEENT: Normocephalic, atraumatic. Face symmetric.  Cardiovascular: regular rate, well perfused. S1/S2 appropriate w/out any appreciable R/M/G  Pulmonary: Bilateral symmetric chest rise. Non-labored  Skin:  Exposed skin is warm & dry.  Extremities: No clubbing, cyanosis or edema.  Neuro:   Patient moves all extremities equally. No signs of peripheral neurological deficit    Pathology:  Final pathology report had returned showing the presence of a unifocal papillary thyroid carcinoma in the left lobe of the thyroid gland measuring 1.0 cm in size.  There was no angioinvasion, lymphatic invasion, perineural invasion, or extrathyroidal extension.  Tumor was completely excised.  Also noted were some areas of nodular hyperplasia.  Results were discussed with the patient.        Assessment & Plan:   1) T1 a N0 M0 papillary thyroid carcinoma involving the left lobe of the thyroid gland status post total thyroidectomy on August " 23, 2021 - intermediate risk of recurrence  2) Hypothyroidism s/p above Thyroidectomy   - Repeat US 01/2024 with no residual thyroid tissue seen-repeat in 1 year  - thyroglobulin 12/2023 2.5  - goal TSH 0.1-2.5  - Synthroid 224 was decreased by 1 day at last visit due to TSH below goal, will continue with this until patient remains on steady dose and repeat TSH in 6 weeks    3) Type II Diabetes Mellitus - poorly controlled   - A1c 11.4, consistent with noncompliance   - unable to tolerate DPP 4/GLP 1, reportedly hospitalized with pancreatitis   -reportedly had UTI multiple times but has not been started on SGLT2   - continue resuming metformin 1000 b.i.d. Tradjenta and pioglitazone  - will start on 10 units nighttime Lantus and titrate 1 unit per night until fasting blood sugar less than 130  - Counseled patient regarding continued to refrain from high-sugar content drinks/foods & increase exercise as tolerated   - A1c, prior to next office visit  - DM Foot Exam performed (12/6/23)  - DM Eye Exam due (2/5/23)  -resume statin  Vitamin-D deficiency   -continues to have low vitamin-D, patient reported taking course we will screen for celiac   -will provide repeat course of vitamin-D supplementation    Follow-ups: RTC in 3-4 months with labs       Checo Butt MD - PGY3  [employer] [facility]

## 2024-05-04 RX ORDER — INSULIN GLARGINE 100 [IU]/ML
10 INJECTION, SOLUTION SUBCUTANEOUS NIGHTLY
Qty: 9 ML | Refills: 3 | Status: SHIPPED | OUTPATIENT
Start: 2024-05-04

## 2024-07-03 ENCOUNTER — OFFICE VISIT (OUTPATIENT)
Dept: FAMILY MEDICINE | Facility: CLINIC | Age: 39
End: 2024-07-03
Payer: MEDICAID

## 2024-07-03 ENCOUNTER — CLINICAL SUPPORT (OUTPATIENT)
Dept: DIABETES | Facility: CLINIC | Age: 39
End: 2024-07-03
Payer: MEDICAID

## 2024-07-03 VITALS
TEMPERATURE: 98 F | RESPIRATION RATE: 18 BRPM | OXYGEN SATURATION: 99 % | SYSTOLIC BLOOD PRESSURE: 100 MMHG | HEIGHT: 65 IN | DIASTOLIC BLOOD PRESSURE: 68 MMHG | BODY MASS INDEX: 32.15 KG/M2 | WEIGHT: 193 LBS | HEART RATE: 76 BPM

## 2024-07-03 DIAGNOSIS — Z00.00 ENCOUNTER FOR WELLNESS EXAMINATION: ICD-10-CM

## 2024-07-03 DIAGNOSIS — E11.9 TYPE 2 DIABETES MELLITUS WITHOUT COMPLICATION, WITHOUT LONG-TERM CURRENT USE OF INSULIN: Primary | ICD-10-CM

## 2024-07-03 DIAGNOSIS — E89.0 POSTSURGICAL HYPOTHYROIDISM: ICD-10-CM

## 2024-07-03 DIAGNOSIS — J30.2 SEASONAL ALLERGIES: ICD-10-CM

## 2024-07-03 DIAGNOSIS — F43.23 ADJUSTMENT DISORDER WITH MIXED ANXIETY AND DEPRESSED MOOD: ICD-10-CM

## 2024-07-03 DIAGNOSIS — E11.65 UNCONTROLLED TYPE 2 DIABETES MELLITUS WITH HYPERGLYCEMIA: Primary | ICD-10-CM

## 2024-07-03 DIAGNOSIS — E55.9 VITAMIN D DEFICIENCY: ICD-10-CM

## 2024-07-03 DIAGNOSIS — E11.65 UNCONTROLLED TYPE 2 DIABETES MELLITUS WITH HYPERGLYCEMIA: ICD-10-CM

## 2024-07-03 DIAGNOSIS — E78.2 MIXED HYPERLIPIDEMIA: ICD-10-CM

## 2024-07-03 DIAGNOSIS — J45.20 MILD INTERMITTENT ASTHMA WITHOUT COMPLICATION: ICD-10-CM

## 2024-07-03 DIAGNOSIS — K76.0 STEATOSIS OF LIVER: ICD-10-CM

## 2024-07-03 PROBLEM — M79.10 MYALGIA: Status: ACTIVE | Noted: 2024-07-03

## 2024-07-03 PROBLEM — Z78.9 STATIN INTOLERANCE: Status: RESOLVED | Noted: 2023-12-11 | Resolved: 2024-07-03

## 2024-07-03 LAB
25(OH)D3+25(OH)D2 SERPL-MCNC: 77 NG/ML (ref 30–80)
ALBUMIN SERPL-MCNC: 4.1 G/DL (ref 3.5–5)
ALBUMIN/GLOB SERPL: 1.1 RATIO (ref 1.1–2)
ALP SERPL-CCNC: 80 UNIT/L (ref 40–150)
ALT SERPL-CCNC: 42 UNIT/L (ref 0–55)
ANION GAP SERPL CALC-SCNC: 7 MEQ/L
AST SERPL-CCNC: 20 UNIT/L (ref 5–34)
BACTERIA #/AREA URNS AUTO: ABNORMAL /HPF
BASOPHILS # BLD AUTO: 0.05 X10(3)/MCL
BASOPHILS NFR BLD AUTO: 0.6 %
BILIRUB SERPL-MCNC: 0.5 MG/DL
BILIRUB UR QL STRIP.AUTO: NEGATIVE
BUN SERPL-MCNC: 10.6 MG/DL (ref 7–18.7)
CALCIUM SERPL-MCNC: 9.7 MG/DL (ref 8.4–10.2)
CHLORIDE SERPL-SCNC: 102 MMOL/L (ref 98–107)
CHOLEST SERPL-MCNC: 188 MG/DL
CHOLEST/HDLC SERPL: 4 {RATIO} (ref 0–5)
CLARITY UR: CLEAR
CO2 SERPL-SCNC: 27 MMOL/L (ref 22–29)
COLOR UR AUTO: ABNORMAL
CREAT SERPL-MCNC: 0.78 MG/DL (ref 0.55–1.02)
CREAT UR-MCNC: 103.1 MG/DL (ref 45–106)
CREAT/UREA NIT SERPL: 14
EOSINOPHIL # BLD AUTO: 0.35 X10(3)/MCL (ref 0–0.9)
EOSINOPHIL NFR BLD AUTO: 4 %
ERYTHROCYTE [DISTWIDTH] IN BLOOD BY AUTOMATED COUNT: 13.1 % (ref 11.5–17)
EST. AVERAGE GLUCOSE BLD GHB EST-MCNC: 205.9 MG/DL
GFR SERPLBLD CREATININE-BSD FMLA CKD-EPI: >60 ML/MIN/1.73/M2
GLOBULIN SER-MCNC: 3.9 GM/DL (ref 2.4–3.5)
GLUCOSE SERPL-MCNC: 262 MG/DL (ref 74–100)
GLUCOSE UR QL STRIP: ABNORMAL
HAV IGM SERPL QL IA: NONREACTIVE
HBA1C MFR BLD: 8.8 %
HBV CORE IGM SERPL QL IA: NONREACTIVE
HBV SURFACE AG SERPL QL IA: NONREACTIVE
HCT VFR BLD AUTO: 40.8 % (ref 37–47)
HCV AB SERPL QL IA: NONREACTIVE
HDLC SERPL-MCNC: 48 MG/DL (ref 35–60)
HGB BLD-MCNC: 14.5 G/DL (ref 12–16)
HGB UR QL STRIP: NEGATIVE
HIV 1+2 AB+HIV1 P24 AG SERPL QL IA: NONREACTIVE
HYALINE CASTS #/AREA URNS LPF: ABNORMAL /LPF
IMM GRANULOCYTES # BLD AUTO: 0.04 X10(3)/MCL (ref 0–0.04)
IMM GRANULOCYTES NFR BLD AUTO: 0.5 %
KETONES UR QL STRIP: NEGATIVE
LDLC SERPL CALC-MCNC: 112 MG/DL (ref 50–140)
LEUKOCYTE ESTERASE UR QL STRIP: NEGATIVE
LYMPHOCYTES # BLD AUTO: 2.51 X10(3)/MCL (ref 0.6–4.6)
LYMPHOCYTES NFR BLD AUTO: 28.5 %
MCH RBC QN AUTO: 29.5 PG (ref 27–31)
MCHC RBC AUTO-ENTMCNC: 35.5 G/DL (ref 33–36)
MCV RBC AUTO: 83.1 FL (ref 80–94)
MICROALBUMIN UR-MCNC: 11.4 UG/ML
MICROALBUMIN/CREAT RATIO PNL UR: 11.1 MG/GM CR (ref 0–30)
MONOCYTES # BLD AUTO: 0.56 X10(3)/MCL (ref 0.1–1.3)
MONOCYTES NFR BLD AUTO: 6.3 %
NEUTROPHILS # BLD AUTO: 5.31 X10(3)/MCL (ref 2.1–9.2)
NEUTROPHILS NFR BLD AUTO: 60.1 %
NITRITE UR QL STRIP: NEGATIVE
NRBC BLD AUTO-RTO: 0 %
PH UR STRIP: 6 [PH]
PLATELET # BLD AUTO: 203 X10(3)/MCL (ref 130–400)
PMV BLD AUTO: 11 FL (ref 7.4–10.4)
POTASSIUM SERPL-SCNC: 4.2 MMOL/L (ref 3.5–5.1)
PROT SERPL-MCNC: 8 GM/DL (ref 6.4–8.3)
PROT UR QL STRIP: NEGATIVE
RBC # BLD AUTO: 4.91 X10(6)/MCL (ref 4.2–5.4)
RBC #/AREA URNS AUTO: ABNORMAL /HPF
SODIUM SERPL-SCNC: 136 MMOL/L (ref 136–145)
SP GR UR STRIP.AUTO: 1.03 (ref 1–1.03)
SQUAMOUS #/AREA URNS LPF: ABNORMAL /HPF
T PALLIDUM AB SER QL: NONREACTIVE
T4 FREE SERPL-MCNC: 1.22 NG/DL (ref 0.7–1.48)
TRIGL SERPL-MCNC: 139 MG/DL (ref 37–140)
TSH SERPL-ACNC: 0.66 UIU/ML (ref 0.35–4.94)
UROBILINOGEN UR STRIP-ACNC: NORMAL
VIT B12 SERPL-MCNC: 594 PG/ML (ref 213–816)
VLDLC SERPL CALC-MCNC: 28 MG/DL
WBC # BLD AUTO: 8.82 X10(3)/MCL (ref 4.5–11.5)
WBC #/AREA URNS AUTO: ABNORMAL /HPF

## 2024-07-03 PROCEDURE — 80053 COMPREHEN METABOLIC PANEL: CPT | Performed by: NURSE PRACTITIONER

## 2024-07-03 PROCEDURE — 3008F BODY MASS INDEX DOCD: CPT | Mod: CPTII,,, | Performed by: NURSE PRACTITIONER

## 2024-07-03 PROCEDURE — 82570 ASSAY OF URINE CREATININE: CPT | Performed by: NURSE PRACTITIONER

## 2024-07-03 PROCEDURE — 99211 OFF/OP EST MAY X REQ PHY/QHP: CPT | Mod: PBBFAC,PN | Performed by: DIETITIAN, REGISTERED

## 2024-07-03 PROCEDURE — 99214 OFFICE O/P EST MOD 30 MIN: CPT | Mod: S$PBB,,, | Performed by: NURSE PRACTITIONER

## 2024-07-03 PROCEDURE — 3046F HEMOGLOBIN A1C LEVEL >9.0%: CPT | Mod: CPTII,,, | Performed by: NURSE PRACTITIONER

## 2024-07-03 PROCEDURE — 84443 ASSAY THYROID STIM HORMONE: CPT | Performed by: NURSE PRACTITIONER

## 2024-07-03 PROCEDURE — G0108 DIAB MANAGE TRN  PER INDIV: HCPCS | Mod: PBBFAC,PN | Performed by: DIETITIAN, REGISTERED

## 2024-07-03 PROCEDURE — 36415 COLL VENOUS BLD VENIPUNCTURE: CPT | Performed by: NURSE PRACTITIONER

## 2024-07-03 PROCEDURE — 99215 OFFICE O/P EST HI 40 MIN: CPT | Mod: PBBFAC,27,PN | Performed by: NURSE PRACTITIONER

## 2024-07-03 PROCEDURE — 3074F SYST BP LT 130 MM HG: CPT | Mod: CPTII,,, | Performed by: NURSE PRACTITIONER

## 2024-07-03 PROCEDURE — 83036 HEMOGLOBIN GLYCOSYLATED A1C: CPT | Performed by: NURSE PRACTITIONER

## 2024-07-03 PROCEDURE — 1160F RVW MEDS BY RX/DR IN RCRD: CPT | Mod: CPTII,,, | Performed by: NURSE PRACTITIONER

## 2024-07-03 PROCEDURE — 87389 HIV-1 AG W/HIV-1&-2 AB AG IA: CPT | Performed by: NURSE PRACTITIONER

## 2024-07-03 PROCEDURE — 80061 LIPID PANEL: CPT | Performed by: NURSE PRACTITIONER

## 2024-07-03 PROCEDURE — 3078F DIAST BP <80 MM HG: CPT | Mod: CPTII,,, | Performed by: NURSE PRACTITIONER

## 2024-07-03 PROCEDURE — 86780 TREPONEMA PALLIDUM: CPT | Performed by: NURSE PRACTITIONER

## 2024-07-03 PROCEDURE — 82306 VITAMIN D 25 HYDROXY: CPT | Performed by: NURSE PRACTITIONER

## 2024-07-03 PROCEDURE — 84439 ASSAY OF FREE THYROXINE: CPT | Performed by: NURSE PRACTITIONER

## 2024-07-03 PROCEDURE — 85025 COMPLETE CBC W/AUTO DIFF WBC: CPT | Performed by: NURSE PRACTITIONER

## 2024-07-03 PROCEDURE — 80074 ACUTE HEPATITIS PANEL: CPT | Performed by: NURSE PRACTITIONER

## 2024-07-03 PROCEDURE — 82607 VITAMIN B-12: CPT | Performed by: NURSE PRACTITIONER

## 2024-07-03 PROCEDURE — 1159F MED LIST DOCD IN RCRD: CPT | Mod: CPTII,,, | Performed by: NURSE PRACTITIONER

## 2024-07-03 PROCEDURE — 81001 URINALYSIS AUTO W/SCOPE: CPT | Performed by: NURSE PRACTITIONER

## 2024-07-03 RX ORDER — BLOOD-GLUCOSE,RECEIVER,CONT
1 EACH MISCELLANEOUS DAILY
Qty: 1 EACH | Refills: 0 | Status: SHIPPED | OUTPATIENT
Start: 2024-07-03 | End: 2025-07-03

## 2024-07-03 RX ORDER — BLOOD-GLUCOSE SENSOR
1 EACH MISCELLANEOUS
Qty: 3 EACH | Refills: 11 | Status: SHIPPED | OUTPATIENT
Start: 2024-07-03 | End: 2025-07-03

## 2024-07-03 RX ORDER — BLOOD-GLUCOSE,RECEIVER,CONT
1 EACH MISCELLANEOUS ONCE
Qty: 1 EACH | Refills: 0 | Status: CANCELLED | OUTPATIENT
Start: 2024-07-03 | End: 2024-07-03

## 2024-07-03 RX ORDER — BLOOD-GLUCOSE SENSOR
1 EACH MISCELLANEOUS
Qty: 3 EACH | Refills: 11 | Status: CANCELLED | OUTPATIENT
Start: 2024-07-03 | End: 2025-07-03

## 2024-07-03 RX ORDER — FLUTICASONE PROPIONATE 50 MCG
1 SPRAY, SUSPENSION (ML) NASAL DAILY
Qty: 18.2 ML | Refills: 11 | Status: SHIPPED | OUTPATIENT
Start: 2024-07-03

## 2024-07-03 RX ORDER — LORATADINE 10 MG/1
10 TABLET ORAL DAILY
Qty: 30 TABLET | Refills: 11 | Status: SHIPPED | OUTPATIENT
Start: 2024-07-03 | End: 2025-07-03

## 2024-07-03 NOTE — ASSESSMENT & PLAN NOTE
FLP today  Is now on Lipitor 10mg po daily  Stressed importance of dietary modifications. Follow a low cholesterol, low saturated fat diet with less that 200mg of cholesterol a day.  Avoid fried foods and high saturated fats (high saturated fats less than 7% of calories).  Add Flax Seed/Fish Oil supplements to diet. Increase dietary fiber.  Regular exercise can reduce LDL and raise HDL. Stressed importance of physical activity 5 times per week for 30 minutes per day.

## 2024-07-03 NOTE — PROGRESS NOTES
Patient Name: Kinza Ford     : 1985    MRN: [id number]     Subjective:     Patient ID: Kinza Ford is a 38 y.o. female.    Chief Complaint:   Chief Complaint   Patient presents with    Follow-up     Pt is here for follow up. Pt reports elevated CBG's.        HPI: 7/3/24:  6 month FU. Followed by Endo for her thyroids and DM.     Type 2 diabetes-  She is now on Lantus.  She is due for microalbumin and foot exam today.    Pt would like Dexcom. Has met with diabetic education but its been a while.    States she eats healthy and is back in the gym.  She can't eat carbs without her glucose increasing high.      Thyroid disease/Thyroidectomy-  Pt sees Endocrine for this issue. Last TSH was <0.008.  She is due for repeat labs.  Hx papillary thyroid cancer.    HLD-  Pt is now on Lipitor. She is not really sure if she is taking it but it is on her medication list.     Seasonal allergies-  Asking for allergy meds. Increased sneezing and itchy eyes.       Health Maintenance-  MMG scheduled for 2024.   PAP-hysterectomy  due to fibroids        ROS:      Review of Systems   Constitutional: Negative.    HENT: Negative.     Eyes: Negative.    Respiratory: Negative.     Cardiovascular: Negative.    Gastrointestinal: Negative.    Genitourinary: Negative.    Musculoskeletal: Negative.    Skin: Negative.    Neurological: Negative.    Endo/Heme/Allergies: Negative.    Psychiatric/Behavioral: Negative.     All other systems reviewed and are negative.         History:     Past Medical History:   Diagnosis Date    Acquired hypothyroidism     Allergy     DM2 (diabetes mellitus, type 2)     Hx of papillary thyroid carcinoma     IBS (irritable bowel syndrome)     Migraines     Mixed hyperlipidemia     Statin intolerance 2023    Uncontrolled type 2 diabetes mellitus with hyperglycemia 2023        Past Surgical History:   Procedure Laterality Date    ADENOIDECTOMY  2014     SECTION       COLONOSCOPY      ESOPHAGOGASTRODUODENOSCOPY      HYSTERECTOMY  12/2021    HYSTERECTOMY, TOTAL, VAGINAL, WITH CYSTOSCOPY  12/21/2021    MODIFIED [facility] CULDOPLASTY    THYROIDECTOMY  08/23/2021    TOTAL 2/2 PAPILLARY THRYROID CARCINOMA    TONSILLECTOMY  06/19/2008    ADENOIDECTOMY    TRIGGER FINGER RELEASE Right 01/05/2023    Procedure: RELEASE, TRIGGER FINGER, RING;  Surgeon: Hang Casas MD;  Location: [facility];  Service: Plastics;  Laterality: Right;    TUBAL LIGATION  06/29/2018    URETERAL STENT PLACEMENT Right 02/27/2008    WISDOM TOOTH EXTRACTION         Family History   Problem Relation Name Age of Onset    No Known Problems Mother      Diabetes Father Tin     Crohn's disease Father Tin     Diabetes Mellitus Father Tin     Diabetes Maternal Aunt Suni     Cancer Paternal Uncle Nash         Social History     Tobacco Use    Smoking status: Never    Smokeless tobacco: Never   Substance and Sexual Activity    Alcohol use: Not Currently     Comment: once a month    Drug use: Never    Sexual activity: Yes     Partners: Male     Birth control/protection: See Surgical Hx     Comment: Full hysterectomy       Current Outpatient Medications   Medication Instructions    albuterol (PROVENTIL/VENTOLIN HFA) 90 mcg/actuation inhaler 1 puff, Inhalation, Every 6 hours PRN, As needed for wheezing.    atorvastatin (LIPITOR) 10 mg, Oral, Daily    azelastine (ASTELIN) 137 mcg, Nasal, 2 times daily    blood sugar diagnostic Strp 1 each, Misc.(Non-Drug; Combo Route), 3 times daily    blood sugar diagnostic Strp To check BG 3 times daily, to use with insurance preferred meter    blood-glucose meter kit To check BG 3 times daily, to use with insurance preferred meter    blood-glucose meter,continuous (DEXCOM G7 ) Misc 1 each, Misc.(Non-Drug; Combo Route), Daily    blood-glucose sensor (DEXCOM G7 SENSOR) Zina 1 each, Misc.(Non-Drug; Combo Route), Every 10 days    cholecalciferol (vitamin D3) 50,000 Units, Oral,  "Every 7 days    EPINEPHrine (EPIPEN) 0.3 mg/0.3 mL AtIn SMARTSI Pre-Filled Pen Syringe IM Once    fluticasone propionate (FLONASE) 50 mcg, Each Nostril, Daily    lancets (ACCU-CHEK SOFTCLIX LANCETS) Misc 1 each, Misc.(Non-Drug; Combo Route), 3 times daily    lancets Misc To check BG 3 times daily, to use with insurance preferred meter    LANTUS SOLOSTAR U-100 INSULIN 10 Units, Subcutaneous, Nightly, Increase by 1 unit nightly until fasting blood sugar is less than 130. Then stay at that dose of insulin    levothyroxine (SYNTHROID) 112 MCG tablet 2 tablets, Oral, Daily, Except  take only one tablet    loratadine (CLARITIN) 10 mg, Oral, Daily    metFORMIN (GLUCOPHAGE-XR) 1,000 mg, Oral, Nightly    pen needle, diabetic 32 gauge x 5/32" Ndle 1 each, Misc.(Non-Drug; Combo Route), Nightly    pioglitazone (ACTOS) 30 mg, Oral, Daily    TRADJENTA 5 mg, Oral, Daily        Review of patient's allergies indicates:   Allergen Reactions    Adhesive      Other reaction(s): Burn    Sulfa (sulfonamide antibiotics)      Other reaction(s): Unknown    Sulfamethoxazole      Other reaction(s): Rash    Ozempic [semaglutide] Nausea And Vomiting    Trulicity [dulaglutide] Nausea And Vomiting       Objective:     Visit Vitals  /68 (BP Location: Left arm, Patient Position: Sitting, BP Method: Large (Automatic))   Pulse 76   Temp 97.8 °F (36.6 °C) (Oral)   Resp 18   Ht 5' 5" (1.651 m)   Wt 87.5 kg (193 lb)   SpO2 99%   BMI 32.12 kg/m²       Physical Examination:     Physical Exam  Vitals reviewed.   Constitutional:       General: She is awake.      Appearance: Normal appearance. She is well-developed.   HENT:      Head: Normocephalic and atraumatic.      Right Ear: Hearing, tympanic membrane, ear canal and external ear normal.      Left Ear: Hearing, tympanic membrane, ear canal and external ear normal.      Nose: Nose normal.      Mouth/Throat:      Lips: Pink.      Mouth: Mucous membranes are moist.      Pharynx: Oropharynx is " clear. Uvula midline.   Eyes:      Pupils: Pupils are equal, round, and reactive to light.   Cardiovascular:      Rate and Rhythm: Normal rate and regular rhythm.      Pulses: Normal pulses.      Heart sounds: Normal heart sounds.   Pulmonary:      Effort: Pulmonary effort is normal.      Breath sounds: Normal breath sounds.   Musculoskeletal:         General: Normal range of motion.      Cervical back: Full passive range of motion without pain, normal range of motion and neck supple.      Right lower leg: No edema.      Left lower leg: No edema.   Skin:     General: Skin is warm and dry.      Capillary Refill: Capillary refill takes less than 2 seconds.   Neurological:      General: No focal deficit present.      Mental Status: She is alert, oriented to person, place, and time and easily aroused. Mental status is at baseline.   Psychiatric:         Attention and Perception: Attention and perception normal.         Mood and Affect: Mood normal.         Behavior: Behavior is cooperative.         Lab Results:     Chemistry:  Lab Results   Component Value Date     02/07/2024    K 4.5 02/07/2024    BUN 16.6 02/07/2024    CREATININE 0.76 02/07/2024    EGFRNORACEVR >60 02/07/2024    GLUCOSE 218 (H) 02/07/2024    CALCIUM 9.4 02/07/2024    ALKPHOS 59 02/07/2024    LABPROT 7.9 02/07/2024    ALBUMIN 3.6 02/07/2024    BILIDIR 0.1 03/21/2022    IBILI 0.30 03/21/2022    AST 15 02/07/2024    ALT 27 02/07/2024    PHOS 3.2 05/24/2023    QVNYJNLL75IW 29.0 (L) 02/07/2024    TSH <0.008 (L) 02/07/2024    KXIOFX0ZNDQ 1.89 (H) 02/07/2024        Lab Results   Component Value Date    HGBA1C 8.2 (H) 02/07/2024        Hematology:  Lab Results   Component Value Date    WBC 8.82 07/03/2024    HGB 14.5 07/03/2024    HCT 40.8 07/03/2024     07/03/2024       Lipid Panel:  Lab Results   Component Value Date    CHOL 199 07/06/2023    HDL 43 07/06/2023    .00 07/06/2023    TRIG 101 07/06/2023    TOTALCHOLEST 5 07/06/2023         Urine:  Lab Results   Component Value Date    APPEARANCEUA Clear 12/27/2022    SGUA 1.042 12/27/2022    PROTEINUA Negative 12/27/2022    KETONESUA Trace (A) 12/27/2022    LEUKOCYTESUR Negative 12/27/2022    RBCUA 0-5 12/27/2022    WBCUA 0-5 12/27/2022    BACTERIA None Seen 12/27/2022    SQEPUA Moderate (A) 12/27/2022    HYALINECASTS None Seen 12/27/2022    CREATRANDUR 147.5 (H) 06/28/2023        Assessment:          ICD-10-CM ICD-9-CM   1. Type 2 diabetes mellitus without complication, without long-term current use of insulin  E11.9 250.00   2. Mixed hyperlipidemia  E78.2 272.2   3. Vitamin D deficiency  E55.9 268.9   4. Encounter for wellness examination  Z00.00 V70.0   5. Postsurgical hypothyroidism  E89.0 244.0   6. Seasonal allergies  J30.2 477.9   7. Adjustment disorder with mixed anxiety and depressed mood  F43.23 309.28   8. Mild intermittent asthma without complication  J45.20 493.90   9. Steatosis of liver  K76.0 571.8        Plan:     1. Type 2 diabetes mellitus without complication, without long-term current use of insulin  Overview:  Last foot exam: 7/3/24  Last eye exam: 12/11/23  Last micro albumin: 7/3/24    Assessment & Plan:  Managed by Endo. Is on Insulin now with instructions to increase by 1 unit daily until reaching 150 glucose.  She is also on Tradjenta, Glucophage, Actos  Encouraged ACE/ARB/Statin according to guidelines.  Follow ADA Diet. Avoid soda, simple sweets, and limit rice/pasta/breads/starches.  Maintain healthy weight with goal BMI <30. Exercise 5 times per week for 30 minutes per day.  Stressed importance of daily foot exams.  Stressed importance of annual dilated eye exam.      Orders:  -     Hemoglobin A1C  -     MICROALBUMIN / CREATININE RATIO URINE  -     blood-glucose sensor (DEXCOM G7 SENSOR) Zina; 1 each by Misc.(Non-Drug; Combo Route) route every 10 days.  Dispense: 3 each; Refill: 11  -     blood-glucose meter,continuous (DEXCOM G7 ) Misc; 1 each by  Misc.(Non-Drug; Combo Route) route once daily.  Dispense: 1 each; Refill: 0    2. Mixed hyperlipidemia  Overview:  Lab Results   Component Value Date    CHOL 199 07/06/2023    CHOL 230 (H) 01/18/2023     Lab Results   Component Value Date    HDL 43 07/06/2023    HDL 39 01/18/2023     No results found for: LDLCALC  Lab Results   Component Value Date    TRIG 101 07/06/2023    TRIG 187 (H) 01/18/2023     No results found for: CHOLHDL;    Assessment & Plan:  FLP today  Is now on Lipitor 10mg po daily  Stressed importance of dietary modifications. Follow a low cholesterol, low saturated fat diet with less that 200mg of cholesterol a day.  Avoid fried foods and high saturated fats (high saturated fats less than 7% of calories).  Add Flax Seed/Fish Oil supplements to diet. Increase dietary fiber.  Regular exercise can reduce LDL and raise HDL. Stressed importance of physical activity 5 times per week for 30 minutes per day.         Orders:  -     Lipid Panel    3. Vitamin D deficiency  Assessment & Plan:  Vitamin d level today    Orders:  -     Vitamin D    4. Encounter for wellness examination  -     CBC Auto Differential  -     Comprehensive Metabolic Panel  -     Urinalysis  -     Vitamin B12  -     Hepatitis Panel, Acute  -     HIV 1/2 Ag/Ab (4th Gen)  -     SYPHILIS ANTIBODY (WITH REFLEX RPR)    5. Postsurgical hypothyroidism  Overview:  Lab Results   Component Value Date    TSH <0.008 (L) 02/07/2024         Assessment & Plan:  Chronic, managed by mariana Green. Is due for labs so we will obtain those today with the rest of her labs.     Orders:  -     TSH  -     T4, Free    6. Seasonal allergies  -     loratadine (CLARITIN) 10 mg tablet; Take 1 tablet (10 mg total) by mouth once daily.  Dispense: 30 tablet; Refill: 11  -     fluticasone propionate (FLONASE) 50 mcg/actuation nasal spray; 1 spray (50 mcg total) by Each Nostril route once daily.  Dispense: 18.2 mL; Refill: 11    7. Adjustment disorder with mixed  anxiety and depressed mood  Assessment & Plan:  Followed by Dr. Tillman, continue all appt and meds as directed  ER for SI/HI      8. Mild intermittent asthma without complication  Assessment & Plan:  Chronic, stable. No recent flares.       9. Steatosis of liver  Assessment & Plan:  Diet and exercise recommended  CMP today           Follow up in about 6 months (around 1/3/2025) for Wellness.    Future Appointments   Date Time Provider Department Center   7/23/2024 12:00 PM [facility] [place]    1/3/2025  8:00 AM Fely Washington FNP [facility]   1/31/2025  8:20 AM [facility], [facility]SON Garcia

## 2024-07-03 NOTE — ASSESSMENT & PLAN NOTE
Managed by Peter. Is on Insulin now with instructions to increase by 1 unit daily until reaching 150 glucose.  She is also on Tradjenta, Glucophage, Actos  Encouraged ACE/ARB/Statin according to guidelines.  Follow ADA Diet. Avoid soda, simple sweets, and limit rice/pasta/breads/starches.  Maintain healthy weight with goal BMI <30. Exercise 5 times per week for 30 minutes per day.  Stressed importance of daily foot exams.  Stressed importance of annual dilated eye exam.

## 2024-07-03 NOTE — PROGRESS NOTES
Diabetes Care Specialist Progress Note  Author: Kellen Haynes RD  Date: 7/3/2024    Program Intake  Reason for Diabetes Program Visit:: Post Program Follow-Up  Type of Follow-Up: Other  Current diabetes risk level:: low  Continuous Glucose Monitoring  Patient has CGM: No    Lab Results   Component Value Date    HGBA1C 8.2 (H) 02/07/2024       Clinical            There is no height or weight on file to calculate BMI.              Clinical Assessment  Current Diabetes Treatment: Oral Medication, Diet, Insulin, Exercise (Actos 30 mg, Metformin 1000mgBID, 50 units Lantus and is unsure if taking Tradjenta)  Have you ever experienced hypoglycemia (low blood sugar)?: no  Have you ever experienced hyperglycemia (high blood sugar)?: yes  Have you ever been hospitalized because your blood sugar was high?: no    Medication Information  How do you obtain your medications?: Patient drives  How many days a week do you miss your medications?: Never  Do you sometimes have difficulty refilling your medications?: No  Medication adherence impacting ability to self-manage diabetes?: No    Labs  Do you have regular lab work to monitor your medications?: Yes  Type of Regular Lab Work: A1c  Where do you get your labs drawn?: [facility]  Lab Compliance Barriers: No    Nutritional Status  Diet: Diabetic diet (lean protein and vegetables)  Change in appetite?: No  Recent Changes in Weight: No Recent Weight Change  Current nutritional status an area of need that is impacting patient's ability to self-manage diabetes?: No    Additional Social History         Access to Mass Media & Technology  Does the patient have access to any of the following devices or technologies?: Smart phone  Media or technology needs impacting ability to self-manage diabetes?: No    Cognitive/Behavioral Health  Alert and Oriented: Yes  Difficulty Thinking: No  Requires Prompting: No  Requires assistance for routine expression?: No  Cognitive or behavioral barriers  impacting ability to self-manage diabetes?: No         Communication  Language preference: English  Hearing Problems: No  Vision Problems: No  Communication needs impacting ability to self-manage diabetes?: No    Health Literacy  Preferred Learning Method: Face to Face  How often do you need to have someone help you read instructions, pamphlets, or written material from your doctor or pharmacy?: Never  Health literacy needs impacting ability to self-manage diabetes?: No      Diabetes Self-Management Skills Assessment    Diabetes Disease Process/Treatment Options  Diabetes Disease Process/Treatment Options: Skills Assessment Completed: No  Deferred due to:: Time  Area of need?: Deferred    Nutrition/Healthy Eating  Method of carbohydrate measurement:: carb counting/reading labels  Patient can identify foods that impact blood sugar.: yes  Patient-identified foods:: fruit/fruit juice, sweets, milk, yogurt, soda, starchy vegetables (corn, peas, beans), starches (bread, pasta, rice, cereal)  Nutrition/Healthy Eating Skills Assessment Completed:: Yes  Assessment indicates:: Adequate understanding  Area of need?: No    Physical Activity/Exercise  Patient's daily activity level:: constantly moving  Patient formally exercises outside of work.: yes  Exercise Type: walking  Intensity: Moderate  Frequency: four or more times a week  Patient can identify forms of physical activity.: yes  Stated forms of physical activity:: any movement performed by muscles that uses energy  Patient can identify reasons why exercise/physical activity is important in diabetes management.: yes  Identified reasons:: lowers blood glucose, blood pressure, and cholesterol  Physical Activity/Exercise Skills Assessment Completed: : Yes  Assessment indicates:: Instruction Needed  Area of need?: Yes    Medications  Patient is able to describe current diabetes management routine.: no  Patient is able to identify current diabetes medications, dosages, and  appropriate timing of medications.: no  Patient understands the purpose of the medications taken for diabetes.: yes  Patient reports problems or concerns with current medication regimen.: yes  Medication regimen problems/concerns:: unsure of doses, does not feel like regimen is working  Medication Skills Assessment Completed:: Yes  Assessment indicates:: Instruction Needed  Area of need?: Yes    Home Blood Glucose Monitoring  Patient states that blood sugar is checked at home daily.: yes  Monitoring Method:: home glucometer  When you check what is your typical blood sugar range? : 220s  Blood glucose logs reviewed today?: no  Home Blood Glucose Monitoring Skills Assessment Completed: : Yes  Assessment indicates:: Instruction Needed  Area of need?: Yes    Acute Complications  Acute Complications Skills Assessment Completed: : No  Deffered due to:: Time  Area of need?: Deferred    Chronic Complications  Chronic Complications Skills Assessment Completed: : No  Deferred due to:: Time  Area of need?: Deferred    Psychosocial/Coping  Psychosocial/Coping Skills Assessment Completed: : No  Deffered due to:: Time  Area of need?: Deferred      Assessment Summary and Plan    Based on today's diabetes care assessment, the following areas of need were identified:          7/3/2024    12:05 AM   Social   Access to Mass Media/Tech No   Cognitive/Behavioral Health No   Communication No   Health Literacy No            7/3/2024    12:05 AM   Clinical   Medication Adherence No   Lab Compliance No   Nutritional Status No            7/3/2024    12:05 AM   Diabetes Self-Management Skills   Diabetes Disease Process/Treatment Options Deferred   Nutrition/Healthy Eating No   Physical Activity/Exercise Yes - will begin exercising in evening to determine if that helps with lowering FBS   Medication Yes - see care plan. She is unsure if taking Tradjents   Home Blood Glucose Monitoring Yes - see care plan.   Acute Complications Deferred    Chronic Complications Deferred   Psychosocial/Coping Deferred          Today's interventions were provided through individual discussion, instruction, and written materials were provided.      Patient verbalized understanding of instruction and written materials.  Pt was able to return back demonstration of instructions today. Patient understood key points, needs reinforcement and further instruction.     Diabetes Self-Management Care Plan:    Today's Diabetes Self-Management Care Plan was developed with Kinza's input. Kinza has agreed to work toward the following goal(s) to improve his/her overall diabetes control.      Care Plan: Diabetes Management   Updates made since 6/3/2024 12:00 AM        Problem: Acute Complications Resolved 7/3/2024        Goal: Patient agrees to identify and manage signs and symptoms of high/low blood sugar (hyper/hypoglycemia) and using proper treatment. Completed 7/3/2024   Start Date: 2/7/2023   Priority: Medium   Barriers: No Barriers Identified        Problem: Blood Glucose Self-Monitoring         Goal: Pt will use Dexcom G7 and phone demarcus to monitor blood glucose    Start Date: 7/3/2024   Expected End Date: 11/13/2024   Priority: High   Barriers: No Barriers Identified   Note:    7/3/24 - provided review for Dexcom G7 use and features. Reviewed setting alerts/alarms. Provided clinic code for clarity connection. Provided sample sensor to test for skin allergy as some irritation reported with medical tape. Will propose orders for PCP. Instructed to contact clinic with questions/concerns.       Problem: Medications         Goal: Patient Agrees to take Diabetes Medication(s) as prescribed.    Start Date: 7/3/2024   Expected End Date: 10/16/2024   Priority: High   Barriers: No Barriers Identified   Note:    7/3/24 - pt will ensure she picks up refills and begin taking Tradjenta if she is not currently.         Follow Up Plan     Follow up if symptoms worsen or fail to improve.    Today's  care plan and follow up schedule was discussed with patient.  Echols verbalized understanding of the care plan, goals, and agrees to follow up plan.        The patient was encouraged to communicate with his/her health care provider/physician and care team regarding his/her condition(s) and treatment.  I provided the patient with my contact information today and encouraged to contact me via phone or [facility] Patient Portal as needed.     Length of Visit   Total Time: 30 Minutes

## 2024-07-03 NOTE — ASSESSMENT & PLAN NOTE
Chronic, managed by Endo, worsening. Is due for labs so we will obtain those today with the rest of her labs.

## 2024-07-19 ENCOUNTER — TELEPHONE (OUTPATIENT)
Dept: ENDOCRINOLOGY | Facility: CLINIC | Age: 39
End: 2024-07-19
Payer: MEDICAID

## 2024-07-19 DIAGNOSIS — E11.9 TYPE 2 DIABETES MELLITUS WITHOUT COMPLICATION, WITHOUT LONG-TERM CURRENT USE OF INSULIN: ICD-10-CM

## 2024-07-19 RX ORDER — INSULIN GLARGINE 100 [IU]/ML
60 INJECTION, SOLUTION SUBCUTANEOUS NIGHTLY
Qty: 30 ML | Refills: 2 | Status: SHIPPED | OUTPATIENT
Start: 2024-07-19

## 2024-07-19 NOTE — TELEPHONE ENCOUNTER
----- Message from Evelyn Barrios sent at 7/19/2024 11:58 AM CDT -----  Patient OF Dr Goodman ,    Patient is requesting the following medication:  insulin (LANTUS SOLOSTAR U-100 INSULIN) glargine 100 units/mL SubQ pen     Please send to the following pharmacy :  [id number] [address] & [address] [address]  Phone: [phone] Fax: [phone]        07/19/2024    PT#[phone]

## 2024-07-19 NOTE — TELEPHONE ENCOUNTER
Pt states she was on 54 units of Lantus and fasting is 140-150 and she is still slowly increasing. I sent in for 60 units a day so she still has room to increase

## 2024-08-15 ENCOUNTER — OFFICE VISIT (OUTPATIENT)
Dept: ENDOCRINOLOGY | Facility: CLINIC | Age: 39
End: 2024-08-15
Payer: MEDICAID

## 2024-08-15 VITALS
TEMPERATURE: 98 F | RESPIRATION RATE: 18 BRPM | OXYGEN SATURATION: 97 % | BODY MASS INDEX: 31.68 KG/M2 | HEART RATE: 84 BPM | DIASTOLIC BLOOD PRESSURE: 78 MMHG | SYSTOLIC BLOOD PRESSURE: 127 MMHG | WEIGHT: 190.13 LBS | HEIGHT: 65 IN

## 2024-08-15 DIAGNOSIS — Z79.4 TYPE 2 DIABETES MELLITUS WITHOUT COMPLICATION, WITH LONG-TERM CURRENT USE OF INSULIN: Primary | ICD-10-CM

## 2024-08-15 DIAGNOSIS — C73 THYROID CANCER: ICD-10-CM

## 2024-08-15 DIAGNOSIS — E11.9 TYPE 2 DIABETES MELLITUS WITHOUT COMPLICATION, WITH LONG-TERM CURRENT USE OF INSULIN: Primary | ICD-10-CM

## 2024-08-15 DIAGNOSIS — E89.0 POSTSURGICAL HYPOTHYROIDISM: ICD-10-CM

## 2024-08-15 PROCEDURE — 99215 OFFICE O/P EST HI 40 MIN: CPT | Mod: PBBFAC

## 2024-08-15 NOTE — PROGRESS NOTES
Name:   Kinza Ford   :   1985  MRN:   [id number]  Indication:  DM2  Equipment Type: Dexcom  Start Date:  2024  End Date:  8/15/2024  Printout Date:  8/15/2024    Analysis:  There is 72 hours of data.  Average glucose is 109 with predicted hga1c of 5.9%.  There is <1% lows with rest in range.    Interpretation:   Excellent control.  Would continue to adjust regimen away from medications associated with hypoglycemia.

## 2024-08-15 NOTE — PROGRESS NOTES
\[facility]\"" Endocrinology Clinic Visit    Chief Complaint:      Follow-up (Thyroid cancer and DM)     Subjective:     HPI:  Kinza Ford is a 38 y.o. female who presents to Endocrinology clinic today for Follow-up (Thyroid cancer and DM).  History of papillary thyroid cancer diagnosed in July 2021 and had a thyroidectomy in August 2021 without radiation, had slight up trend in thyroglobulin making her intermediate risk for recurrence.  Patient referred to endocrinology clinic by ENT for further evaluation of her thyroid cancer surveillance.  Patient had an elevated globulin tumor marker level of 4.8 in November 2022.  She had repeat ultrasound of the neck showing mild scarring at site of thyroidectomy.  CT of the neck showed no pathology, no acute pathology disease at the neck.      (9/19/23): At last office visit, patient's levothyroxine dosage was increased from 200 to 224 mcg daily; however, she only started taking increased dosage approximately 1 week ago and was unable to have repeat labs drawn prior to today's visit due to rescheduling of her appointment. With regards to her DM, at last OV, trulicity was stopped due to GI side effects. However, patient reports that she also opted to decrease her metformin from BID to daily, and she stopped her pioglitazone 15 daily, farxiga 10 daily altogether and did not fill prescription for tradjenta. She alsostopped checking her cbgs reportedly due to issues with insurance coverage. Denies any weight gain, fatigue , diarrhea, polyuria, polydipsia at this time.     (12/6/23):  S visit; patient reports feeling better overall though states she is had occupational challenges to eating/taking medications appropriately. This situation has since improved though had to unfortunately self discontinue previously prescribed Mounjoro after taking a proximally x3 doses total due to significant GI symptoms (nausea, vomiting, diarrhea, abdominal cramping course).  Patient states this  occurred after also taking Trulicity and Ozempic in the past in his care to take this medication diet moving forward.  Last dose of majora a proximally Thanksgiving.  We discussed restarting previously prescribed Tradjenta & Pioglitazone, and uptitrating Metformin to 1000mg BID own in which she was amenable.  Has not taken CBG readings in multple months. In regards to her previous thyroid cancer s/p thyroidectomy and subsequent hypo thyroidism, she reports close compliance with Synthroid 244 mcg q.d..  She does endorse symptoms such as anxiety, heat intolerance, and episodes of intermittent diaphoresis.  She denies any difficulty with sleep, tachycardia, palpitations, diarrhea, unexpected weight loss.  Denies any new palpable nodules the previous surgical site or regional adenopathy.     5/3/2024: today pt notes she has been unable to rememebr to take medications due to having a hectic schedule at home.  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  She currently still works as a  and she has not able to miss any of those days.  She reports that she works roughly 19 hours a day and this has been persistent for the last several months.  Due to this she forgets to take her diabetic medications however she does often remember to take her levothyroxine due to taking it at different time.  She reports that over the last week she has been able to remembers to start taking her diabetic medications.  She denies any weight loss, she does have polydipsia, no polyphagia, there is some polyuria.  She does not feel fatigued or sick.  She reports that she has had no palpitations, nausea, vomiting, diarrhea.  She has previously been on GLP ones in the past and did not tolerate them due to what she believes was pancreatitis, she had been admitted to the hospital when on these medications.  She is supposed to be taking pioglitazone, metformin and Tradjenta.  She reports that she often is taking those  "medications at night instead of the Actos in the morning as recommended.    8/15/2024: Insulin uptitrated to 40 units. (Down from max of 60 units with PCP). Has since received a Dexcom and is doing great with A1c of 5.9. She is concerned with losing weight. Otherwise she is doing well. Due soon for yearly monitoring for Indeterminate previous values.     Review of Systems  Review of Systems   Constitutional:  Negative for chills, diaphoresis, fever, malaise/fatigue and weight loss.   Respiratory:  Negative for cough, hemoptysis, sputum production, shortness of breath and wheezing.    Cardiovascular:  Negative for chest pain, palpitations, orthopnea, claudication, leg swelling and PND.   Gastrointestinal:  Negative for abdominal pain, blood in stool, constipation, diarrhea, heartburn, melena, nausea and vomiting.   Genitourinary:  Negative for dysuria, flank pain, frequency, hematuria and urgency.   Skin:  Negative for itching and rash.   Neurological:  Negative for focal weakness and headaches.       Objective:   Last 24 Hour Vital Signs:  Vitals  BP: 127/78  Temp: 98.4 °F (36.9 °C)  Temp Source: Oral  Pulse: 84  Resp: 18  SpO2: 97 %  Height: 5' 5" (165.1 cm)  Weight: 86.3 kg (190 lb 2.4 oz)    Physical Examination:  General: well developed; pleasant and cooperative in no acute distress  HEENT: Normocephalic, atraumatic. Face symmetric.  Cardiovascular: regular rate, well perfused. S1/S2 appropriate w/out any appreciable R/M/G  Pulmonary: Bilateral symmetric chest rise. Non-labored  Skin:  Exposed skin is warm & dry.  Extremities: No clubbing, cyanosis or edema.  Neuro:   Patient moves all extremities equally. No signs of peripheral neurological deficit    Pathology:  Final pathology report had returned showing the presence of a unifocal papillary thyroid carcinoma in the left lobe of the thyroid gland measuring 1.0 cm in size.  There was no angioinvasion, lymphatic invasion, perineural invasion, or extrathyroidal " extension.  Tumor was completely excised.  Also noted were some areas of nodular hyperplasia.  Results were discussed with the patient.        Assessment & Plan:     1) T1 a N0 M0 papillary thyroid carcinoma involving the left lobe of the thyroid gland status post total thyroidectomy on August 23, 2021 - intermediate risk of recurrence  2) Hypothyroidism s/p above Thyroidectomy   - Repeat US 01/2024 with no residual thyroid tissue seen-repeat in 1 year, ordering today for follow up  - thyroglobulin 12/2023 2.5, 2/7/2024 0.4  - goal TSH 0.1-2.5  - Continue Synthroid 224   - Repeat TSH, Thyroglobulin    3) Type II Diabetes Mellitus - poorly controlled   - A1c 8.8 7/3/2024, from 11.4   - Currently on Dexcom over 45 days and is showing great control (5.9)   - unable to tolerate DPP 4/GLP 1, reportedly hospitalized with pancreatitis   - Will start empaglifozin 10mg today (advised on side effects with UTI, Yeast infections)   - continue resuming metformin 1000 b.i.d. Tradjenta and pioglitazone  - Continue Lantus 40 units, with thought  - Counseled patient regarding continued to refrain from high-sugar content drinks/foods & increase exercise as tolerated   - A1c, prior to next office visit  - DM Foot Exam performed (12/6/23)  - DM Eye Exam done (12/11/2023)  -resume statin    Vitamin-D deficiency   -continues to have low vitamin-D, patient reported taking course we will screen for celiac   -will provide repeat course of vitamin-D supplementation    Follow-ups: 6 months, Thyroid JACKSON, Start Empaglifozin, TSH, A1c, Thyroglobulin      Adam Velez MD  \[facility]\"" INTERNAL MEDICINE PGY-3  08/15/2024 8:06 AM  [facility]

## 2024-09-03 ENCOUNTER — OFFICE VISIT (OUTPATIENT)
Dept: URGENT CARE | Facility: CLINIC | Age: 39
End: 2024-09-03
Payer: MEDICAID

## 2024-09-03 VITALS
RESPIRATION RATE: 17 BRPM | WEIGHT: 185 LBS | OXYGEN SATURATION: 97 % | HEIGHT: 65 IN | DIASTOLIC BLOOD PRESSURE: 73 MMHG | TEMPERATURE: 99 F | HEART RATE: 90 BPM | SYSTOLIC BLOOD PRESSURE: 105 MMHG | BODY MASS INDEX: 30.82 KG/M2

## 2024-09-03 DIAGNOSIS — J06.9 UPPER RESPIRATORY TRACT INFECTION, UNSPECIFIED TYPE: Primary | ICD-10-CM

## 2024-09-03 PROCEDURE — 99215 OFFICE O/P EST HI 40 MIN: CPT | Mod: PBBFAC | Performed by: FAMILY MEDICINE

## 2024-09-03 PROCEDURE — 63600175 PHARM REV CODE 636 W HCPCS

## 2024-09-03 PROCEDURE — 99213 OFFICE O/P EST LOW 20 MIN: CPT | Mod: S$PBB,,, | Performed by: FAMILY MEDICINE

## 2024-09-03 RX ORDER — DEXAMETHASONE SODIUM PHOSPHATE 10 MG/ML
8 INJECTION INTRAMUSCULAR; INTRAVENOUS
Status: COMPLETED | OUTPATIENT
Start: 2024-09-03 | End: 2024-09-03

## 2024-09-03 RX ADMIN — DEXAMETHASONE SODIUM PHOSPHATE 8 MG: 10 INJECTION INTRAMUSCULAR; INTRAVENOUS at 02:09

## 2024-09-03 NOTE — PROGRESS NOTES
"Subjective:       Patient ID: Kinza Ford is a 38 y.o. female.    Vitals:  height is 5' 5" (1.651 m) and weight is 83.9 kg (185 lb). Her oral temperature is 98.5 °F (36.9 °C). Her blood pressure is 105/73 and her pulse is 90. Her respiration is 17 and oxygen saturation is 97%.     Chief Complaint: Sinus Problem (Sinus pressure, headache, cough, x 5 days)    Patient with several days of symptoms.  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  Steroids usually help.  She has a history of diabetes but states it is well controlled.    Sinus Problem  This is a new problem. The current episode started in the past 7 days. There has been no fever. Associated symptoms include congestion, coughing and sinus pressure. Pertinent negatives include no chills, neck pain, shortness of breath, sore throat or swollen glands.         Constitution: Negative for chills.   HENT:  Positive for congestion and sinus pressure. Negative for sore throat.    Neck: Negative for neck pain.   Respiratory:  Positive for cough. Negative for shortness of breath.        Objective:   Physical Exam   Constitutional: She appears well-developed.  Non-toxic appearance. She does not appear ill. No distress.   HENT:   Head: Atraumatic.   Nose: No purulent discharge. Right sinus exhibits no maxillary sinus tenderness and no frontal sinus tenderness. Left sinus exhibits no maxillary sinus tenderness and no frontal sinus tenderness.   Mouth/Throat: Uvula is midline. No oropharyngeal exudate or posterior oropharyngeal erythema.   Eyes: Right eye exhibits no discharge. Left eye exhibits no discharge. Extraocular movement intact   Neck: Neck supple. No neck rigidity present.   Cardiovascular: Regular rhythm.   Pulmonary/Chest: Effort normal and breath sounds normal. No respiratory distress. She has no wheezes. She has no rhonchi. She has no rales.   Lymphadenopathy:     She has no cervical adenopathy.   Neurological: She is alert.   Skin: Skin " is warm, dry and not diaphoretic.   Psychiatric: Her behavior is normal.   Nursing note and vitals reviewed.        Assessment:     1. Upper respiratory tract infection, unspecified type          Plan:   Consider COVID testing if condition worsens   Agreed to give Decadron IM.  She understands this may elevate her blood glucose and she will monitor that.  Encourage fluids, monitoring.    Upper respiratory tract infection, unspecified type  -     dexAMETHasone injection 8 mg        Please note: This chart was completed via voice to text dictation. It may contain typographical/word recognition errors. If there are any questions, please contact the provider for final clarification.

## 2024-09-12 ENCOUNTER — OFFICE VISIT (OUTPATIENT)
Dept: URGENT CARE | Facility: CLINIC | Age: 39
End: 2024-09-12
Payer: MEDICAID

## 2024-09-12 VITALS
TEMPERATURE: 98 F | OXYGEN SATURATION: 98 % | SYSTOLIC BLOOD PRESSURE: 123 MMHG | BODY MASS INDEX: 30.81 KG/M2 | DIASTOLIC BLOOD PRESSURE: 87 MMHG | RESPIRATION RATE: 20 BRPM | WEIGHT: 184.94 LBS | HEART RATE: 93 BPM | HEIGHT: 65 IN

## 2024-09-12 DIAGNOSIS — B96.89 SINUSITIS, BACTERIAL: Primary | ICD-10-CM

## 2024-09-12 DIAGNOSIS — J32.9 SINUSITIS, BACTERIAL: Primary | ICD-10-CM

## 2024-09-12 PROCEDURE — 99213 OFFICE O/P EST LOW 20 MIN: CPT | Mod: S$PBB,,, | Performed by: FAMILY MEDICINE

## 2024-09-12 PROCEDURE — 99215 OFFICE O/P EST HI 40 MIN: CPT | Mod: PBBFAC | Performed by: FAMILY MEDICINE

## 2024-09-12 RX ORDER — AMOXICILLIN AND CLAVULANATE POTASSIUM 875; 125 MG/1; MG/1
1 TABLET, FILM COATED ORAL 2 TIMES DAILY
Qty: 14 TABLET | Refills: 0 | Status: SHIPPED | OUTPATIENT
Start: 2024-09-12 | End: 2024-09-19

## 2024-09-12 NOTE — PROGRESS NOTES
"Subjective:       Patient ID: Kinza Ford is a 38 y.o. female.    Vitals:  height is 5' 5" (1.651 m) and weight is 83.9 kg (184 lb 15.5 oz). Her temperature is 97.7 °F (36.5 °C). Her blood pressure is 123/87 and her pulse is 93. Her respiration is 20 and oxygen saturation is 98%.     Chief Complaint: URI (SEEN ON 9/3 SAME COMPLAINT, STEROID INJ GIVEN AT THAT TIME)    Patient with recent URI symptoms, resolved, then 2 days later returned with worsening sinus pressure, purulent rhinorrhea.  No fever.  Minimal cough.    All other systems are negative    Chart reviewed    Objective:   Physical Exam   Constitutional: She appears well-developed.  Non-toxic appearance. She does not appear ill. No distress.   HENT:   Head: Atraumatic.   Nose: No purulent discharge. Right sinus exhibits maxillary sinus tenderness. Right sinus exhibits no frontal sinus tenderness. Left sinus exhibits maxillary sinus tenderness. Left sinus exhibits no frontal sinus tenderness.   Mouth/Throat: Uvula is midline. No oropharyngeal exudate or posterior oropharyngeal erythema.   Eyes: Right eye exhibits no discharge. Left eye exhibits no discharge. Extraocular movement intact   Neck: Neck supple. No neck rigidity present.   Cardiovascular: Regular rhythm.   Pulmonary/Chest: Effort normal and breath sounds normal. No respiratory distress. She has no wheezes. She has no rhonchi. She has no rales.   Lymphadenopathy:     She has no cervical adenopathy.   Neurological: She is alert.   Skin: Skin is warm, dry and not diaphoretic.   Psychiatric: Her behavior is normal.   Nursing note and vitals reviewed.        Assessment:     1. Sinusitis, bacterial            Plan:   Secondary to double sickening will treat as presumed bacterial sinusitis.  Take medications as prescribed.  Consider intranasal steroids like Flonase and other over-the-counter medications to treat your symptoms.  Consider saline nasal rinses.      Please follow instructions on " patient education material.  Follow up with your PCP or return to urgent care in 4-5 days if symptoms are not improving. Seek care immediately if new or worsening symptoms develop.      Sinusitis, bacterial  -     amoxicillin-clavulanate 875-125mg (AUGMENTIN) 875-125 mg per tablet; Take 1 tablet by mouth 2 (two) times daily. for 7 days  Dispense: 14 tablet; Refill: 0        Please note: This chart was completed via voice to text dictation. It may contain typographical/word recognition errors. If there are any questions, please contact the provider for final clarification.

## 2024-09-12 NOTE — LETTER
September 12, 2024      [facility]  [address] [address]  Phone: [phone]       Patient: Kinza Ford   YOB: 1985  Date of Visit: 09/12/2024    To Whom It May Concern:    Elijah Ford  was at [facility] on 09/12/2024. The patient may return to work/school on SEPT 13 2024 with no restrictions. If you have any questions or concerns, or if I can be of further assistance, please do not hesitate to contact me.    Sincerely,    VINOD CHANDLER MD

## 2024-09-23 RX ORDER — LEVOTHYROXINE SODIUM 112 UG/1
TABLET ORAL
Qty: 170 TABLET | Refills: 1 | Status: SHIPPED | OUTPATIENT
Start: 2024-09-23

## 2024-10-07 ENCOUNTER — CLINICAL SUPPORT (OUTPATIENT)
Dept: DIABETES | Facility: CLINIC | Age: 39
End: 2024-10-07
Payer: MEDICAID

## 2024-10-07 VITALS — WEIGHT: 188.5 LBS | BODY MASS INDEX: 31.37 KG/M2

## 2024-10-07 DIAGNOSIS — E11.65 UNCONTROLLED TYPE 2 DIABETES MELLITUS WITH HYPERGLYCEMIA: Primary | ICD-10-CM

## 2024-10-07 PROCEDURE — 99211 OFF/OP EST MAY X REQ PHY/QHP: CPT | Mod: PBBFAC,PN | Performed by: DIETITIAN, REGISTERED

## 2024-10-07 NOTE — PROGRESS NOTES
Diabetes Care Specialist Progress Note  Author: Kellen Haynes RD  Date: 10/7/2024    Intake    Program Intake  Reason for Diabetes Program Visit:: Post Program Follow-Up    Current Diabetes Treatment: Diet/Exercise, Insulin, Oral Medications  Diet/Exercise Type/Dose: exercises for 1 hour 5-7 days per week  Oral Medication Type/Dose: Metformin 1000mg BID, Tradjenta 5mg and Actos 30 mg  Method of insulin delivery?: Injections  Injection Type: Pens  Pen Type/Dose: 50 units Lantus at night    Continuous Glucose Monitoring  Patient has CGM: Yes  Personal CGM type:: Dexcom G7      Lab Results   Component Value Date    HGBA1C 8.8 (H) 07/03/2024       Weight: 85.5 kg (188 lb 8 oz)       Body mass index is 31.37 kg/m².    Lifestyle Coping Support & Clinical    Lifestyle/Coping/Support  Does anyone in your family have diabetes or does anyone in your family support you in your diabetes care?: dad had diabetes  List anything about Diabetes that causes you stress?: diet  How do you deal with stress/distress?: exercise, family time  Learning Barriers:: None  Psychosocial/Coping Skills Assessment Completed: : Yes  Assessment indicates:: Adequate understanding  Area of need?: No         Diabetes Self-Management Skills Assessment    Medication Skills Assessment  Patient is able to identify current diabetes medications, dosages, and appropriate timing of medications.: yes  Patient reports problems or concerns with current medication regimen.: yes  Medication regimen problems/concerns:: does not feel like regimen is working  Patient is  aware that some diabetes medications can cause low blood sugar?: Yes  Medication Skills Assessment Completed:: Yes  Assessment indicates:: Adequate understanding  Area of need?: No    Diabetes Disease Process/Treatment Options  Diabetes Disease Process/Treatment Options: Skills Assessment Completed: No  Deferred due to:: Time  Area of need?: Deferred    Nutrition/Healthy Eating  Meal Plan 24 Hour  Recall - Lunch: eats every 3 hours  Meal Plan 24 Hour Recall - Dinner: low carb meals such as meat and cheese  Nutrition/Healthy Eating Skills Assessment Completed:: Yes  Assessment indicates:: Instruction Needed  Area of need?: Yes    Physical Activity/Exercise  Patient's daily activity level:: constantly moving  Patient formally exercises outside of work.: yes  Frequency: four or more times a week  Patient can identify forms of physical activity.: yes  Physical Activity/Exercise Skills Assessment Completed: : Yes  Assessment indicates:: Adequate understanding  Area of need?: No    Home Blood Glucose Monitoring  Patient states that blood sugar is checked at home daily.: yes  Monitoring Method:: personal continuous glucose monitor  Personal CGM type:: Dexcom G7   What is your current Time in Range?: 100%  What is your A1c Target?: <7  Home Blood Glucose Monitoring Skills Assessment Completed: : Yes  Assessment indicates:: Instruction Needed  Area of need?: Yes    Acute Complications  Have you ever had hypoglycemia (low BG 70 or less)?: no  Have you ever had hyperglycemia (high  or more)?: no  Have you ever had DKA?: no  Acute Complications Skills Assessment Completed: : Yes  Assessment indicates:: Adequate understanding  Area of need?: No    Chronic Complications  Chronic Complications Skills Assessment Completed: : No  Deferred due to:: Time  Area of need?: Deferred      Assessment Summary and Plan    Based on today's diabetes care assessment, the following areas of need were identified:          10/7/2024   Areas of Need   Medications/Current Diabetes Treatment No   Lifestyle Coping Support No   Diabetes Disease Process/Treatment Options Deferred   Nutrition/Healthy Eating Yes - see care plan   Physical Activity/Exercise No   Home Blood Glucose Monitoring Yes - dexcom report reviewed.   Acute Complications No   Chronic Complications Deferred            Today's interventions were provided through individual  discussion, instruction, and written materials were provided.      Patient verbalized understanding of instruction and written materials.  Pt was able to return back demonstration of instructions today. Patient understood key points, needs reinforcement and further instruction.     Diabetes Self-Management Care Plan:    Today's Diabetes Self-Management Care Plan was developed with Kinza's input. Kinza has agreed to work toward the following goal(s) to improve his/her overall diabetes control.      Care Plan: Diabetes Management   Updates made since 9/7/2024 12:00 AM        Problem: Blood Glucose Self-Monitoring Resolved 10/7/2024        Goal: Pt will use Dexcom G7 and phone demarcus to monitor blood glucose Completed 10/7/2024   Start Date: 7/3/2024   Expected End Date: 11/13/2024   This Visit's Progress: Met   Priority: High   Barriers: No Barriers Identified   Note:    7/3/24 - provided review for Dexcom G7 use and features. Reviewed setting alerts/alarms. Provided clinic code for clarity connection. Provided sample sensor to test for skin allergy as some irritation reported with medical tape. Will propose orders for PCP. Instructed to contact clinic with questions/concerns.       Problem: Medications Resolved 10/7/2024        Goal: Patient Agrees to take Diabetes Medication(s) as prescribed. Completed 10/7/2024   Start Date: 7/3/2024   Expected End Date: 10/16/2024   This Visit's Progress: Met   Priority: High   Barriers: No Barriers Identified   Note:    7/3/24 - pt will ensure she picks up refills and begin taking Tradjenta if she is not currently.       Problem: Healthy Eating         Goal: continue to monitor effects of various carbohydrate foods on blood sugar while using CGM    Start Date: 10/7/2024   Expected End Date: 11/15/2024   Priority: Medium   Barriers: No Barriers Identified   Note:    10/7/24 - pt is concerned about various carbohydrate foods and effects on blood sugar. However, pt is at goal with GMI -  6.3% and 100% TIR. Pt is very knowledgeable about carbs and whole grains. Reviewed experimenting with resistant starches and monitor effects on blood sugar. Continue to include non-starchy vegetables. She is interested in weight loss. Encourage to be realistic about wt loss goals. Encouraged to focus on health outcomes: BP, A1C, blood lipid profile and stress management. Reiterated that she is doing a great job of controlling A1C and blood glucose.       Task: Reviewed the sources and role of Carbohydrate, Protein, and Fat and how each nutrient impacts blood sugar. Completed 10/7/2024          Follow Up Plan     No follow-ups on file.    Today's care plan and follow up schedule was discussed with patient.  Echols verbalized understanding of the care plan, goals, and agrees to follow up plan.        The patient was encouraged to communicate with his/her health care provider/physician and care team regarding his/her condition(s) and treatment.  I provided the patient with my contact information today and encouraged to contact me via phone or [facility] as needed.     Length of Visit   Total Time: 30 Minutes

## 2024-10-11 ENCOUNTER — OFFICE VISIT (OUTPATIENT)
Dept: FAMILY MEDICINE | Facility: CLINIC | Age: 39
End: 2024-10-11
Payer: MEDICAID

## 2024-10-11 VITALS
DIASTOLIC BLOOD PRESSURE: 76 MMHG | HEIGHT: 65 IN | HEART RATE: 71 BPM | BODY MASS INDEX: 30.82 KG/M2 | OXYGEN SATURATION: 98 % | RESPIRATION RATE: 18 BRPM | TEMPERATURE: 98 F | SYSTOLIC BLOOD PRESSURE: 115 MMHG | WEIGHT: 185 LBS

## 2024-10-11 DIAGNOSIS — N39.3 STRESS INCONTINENCE OF URINE: ICD-10-CM

## 2024-10-11 DIAGNOSIS — Z23 FLU VACCINE NEED: Primary | ICD-10-CM

## 2024-10-11 PROBLEM — R32 URINARY INCONTINENCE: Status: ACTIVE | Noted: 2024-10-11

## 2024-10-11 LAB
BACTERIA #/AREA URNS AUTO: ABNORMAL /HPF
BILIRUB UR QL STRIP.AUTO: NEGATIVE
CLARITY UR: CLEAR
COLOR UR AUTO: ABNORMAL
GLUCOSE UR QL STRIP: ABNORMAL
HGB UR QL STRIP: NEGATIVE
HYALINE CASTS #/AREA URNS LPF: ABNORMAL /LPF
KETONES UR QL STRIP: NEGATIVE
LEUKOCYTE ESTERASE UR QL STRIP: NEGATIVE
NITRITE UR QL STRIP: NEGATIVE
PH UR STRIP: 5.5 [PH]
PROT UR QL STRIP: NEGATIVE
RBC #/AREA URNS AUTO: ABNORMAL /HPF
SP GR UR STRIP.AUTO: 1.03 (ref 1–1.03)
SQUAMOUS #/AREA URNS LPF: ABNORMAL /HPF
UROBILINOGEN UR STRIP-ACNC: NORMAL
WBC #/AREA URNS AUTO: ABNORMAL /HPF

## 2024-10-11 PROCEDURE — 88108 CYTOPATH CONCENTRATE TECH: CPT | Mod: TC | Performed by: NURSE PRACTITIONER

## 2024-10-11 PROCEDURE — 99215 OFFICE O/P EST HI 40 MIN: CPT | Mod: PBBFAC,PN | Performed by: NURSE PRACTITIONER

## 2024-10-11 PROCEDURE — 81001 URINALYSIS AUTO W/SCOPE: CPT | Performed by: NURSE PRACTITIONER

## 2024-10-11 NOTE — PROGRESS NOTES
Patient Name: Kinza Ford     : 1985    MRN: [id number]     Subjective:     Patient ID: Kinza Ford is a 38 y.o. female.    Chief Complaint:   Chief Complaint   Patient presents with    Follow-up     Pt here with c/o increased bladder incontinence         HPI: 10/11/24: Bladder incontinence.  New complaint today.  She only has this issue when she is active.  If she is doing dancing or running on a treadmill she is almost having to wear a diaper because she leaks out so much urine. She has had 5 deliveries.  She has been doing kegel exercises every time she goes to the bathroom.  She has hx thyroid cancer in past.  States it just started happening all of a sudden.  She has been making sure that she empties out everything each time she urinates by bending forward, etc. Denies urgency, frequency, nocturia.          ROS:      Review of Systems   Genitourinary:         Incontinence   All other systems reviewed and are negative.           History:     Past Medical History:   Diagnosis Date    Acquired hypothyroidism     Allergy     DM2 (diabetes mellitus, type 2)     Hx of papillary thyroid carcinoma     IBS (irritable bowel syndrome)     Migraines     Mixed hyperlipidemia     Statin intolerance 2023    Uncontrolled type 2 diabetes mellitus with hyperglycemia 2023        Past Surgical History:   Procedure Laterality Date    ADENOIDECTOMY  2014     SECTION      COLONOSCOPY      ESOPHAGOGASTRODUODENOSCOPY      HYSTERECTOMY  2021    HYSTERECTOMY, TOTAL, VAGINAL, WITH CYSTOSCOPY  2021    MODIFIED [facility] CULDOPLASTY    THYROIDECTOMY  2021    TOTAL 2/2 PAPILLARY THRYROID CARCINOMA    TONSILLECTOMY  2008    ADENOIDECTOMY    TRIGGER FINGER RELEASE Right 2023    Procedure: RELEASE, TRIGGER FINGER, RING;  Surgeon: Hang Casas MD;  Location: [facility];  Service: Plastics;  Laterality: Right;    TUBAL LIGATION  2018    URETERAL STENT PLACEMENT  Right 2008    WISDOM TOOTH EXTRACTION         Family History   Problem Relation Name Age of Onset    No Known Problems Mother      Diabetes Father Tin     Crohn's disease Father Tin     Diabetes Mellitus Father Tin     Diabetes Maternal Aunt Suni     Cancer Paternal Uncle Nash         Social History     Tobacco Use    Smoking status: Never    Smokeless tobacco: Never   Substance and Sexual Activity    Alcohol use: Yes     Comment: once a month    Drug use: Never    Sexual activity: Yes     Partners: Male     Birth control/protection: See Surgical Hx     Comment: Full hysterectomy       Current Outpatient Medications   Medication Instructions    albuterol (PROVENTIL/VENTOLIN HFA) 90 mcg/actuation inhaler 1 puff, Inhalation, Every 6 hours PRN, As needed for wheezing.    atorvastatin (LIPITOR) 10 mg, Oral, Daily    azelastine (ASTELIN) 137 mcg, Nasal, 2 times daily    blood sugar diagnostic Strp To check BG 3 times daily, to use with insurance preferred meter    blood-glucose meter kit To check BG 3 times daily, to use with insurance preferred meter    blood-glucose meter,continuous (DEXCOM G7 ) Misc 1 each, Misc.(Non-Drug; Combo Route), Daily    blood-glucose sensor (DEXCOM G7 SENSOR) Zina 1 each, Misc.(Non-Drug; Combo Route), Every 10 days    cholecalciferol (vitamin D3) 50,000 Units, Oral, Every 7 days    empagliflozin (JARDIANCE) 10 mg, Oral, Daily    EPINEPHrine (EPIPEN) 0.3 mg/0.3 mL AtIn SMARTSI Pre-Filled Pen Syringe IM Once    fluticasone propionate (FLONASE) 50 mcg, Each Nostril, Daily    lancets (ACCU-CHEK SOFTCLIX LANCETS) Misc 1 each, Misc.(Non-Drug; Combo Route), 3 times daily    lancets Misc To check BG 3 times daily, to use with insurance preferred meter    LANTUS SOLOSTAR U-100 INSULIN 60 Units, Subcutaneous, Nightly    levothyroxine (SYNTHROID) 112 MCG tablet 2 tabs po Monday through Saturday, 1 tab on Sundays    loratadine (CLARITIN) 10 mg, Oral, Daily    metFORMIN  "(GLUCOPHAGE-XR) 1,000 mg, Oral, Nightly    pen needle, diabetic 32 gauge x 5/32" Ndle 1 each, Misc.(Non-Drug; Combo Route), Nightly    pioglitazone (ACTOS) 30 mg, Oral, Daily    TRADJENTA 5 mg, Oral, Daily        Review of patient's allergies indicates:   Allergen Reactions    Adhesive      Other reaction(s): Burn    Sulfa (sulfonamide antibiotics)      Other reaction(s): Unknown    Sulfamethoxazole      Other reaction(s): Rash    Ozempic [semaglutide] Nausea And Vomiting    Trulicity [dulaglutide] Nausea And Vomiting       Objective:     Visit Vitals  /76   Pulse 71   Temp 97.5 °F (36.4 °C) (Oral)   Resp 18   Ht 5' 5" (1.651 m)   Wt 83.9 kg (185 lb)   SpO2 98%   BMI 30.79 kg/m²       Physical Examination:     Physical Exam  Vitals reviewed.   Constitutional:       Appearance: Normal appearance. She is normal weight.   HENT:      Head: Normocephalic.   Cardiovascular:      Rate and Rhythm: Normal rate and regular rhythm.      Pulses: Normal pulses.      Heart sounds: Normal heart sounds.   Pulmonary:      Effort: Pulmonary effort is normal.      Breath sounds: Normal breath sounds.   Abdominal:      General: Abdomen is flat.      Palpations: Abdomen is soft.   Musculoskeletal:         General: Normal range of motion.      Cervical back: Normal range of motion.   Skin:     General: Skin is warm and dry.   Neurological:      Mental Status: She is alert.   Psychiatric:         Mood and Affect: Mood normal.         Lab Results:     Chemistry:  Lab Results   Component Value Date     07/03/2024    K 4.2 07/03/2024    BUN 10.6 07/03/2024    CREATININE 0.78 07/03/2024    EGFRNORACEVR >60 07/03/2024    GLUCOSE 262 (H) 07/03/2024    CALCIUM 9.7 07/03/2024    ALKPHOS 80 07/03/2024    LABPROT 8.0 07/03/2024    ALBUMIN 4.1 07/03/2024    BILIDIR 0.1 03/21/2022    IBILI 0.30 03/21/2022    AST 20 07/03/2024    ALT 42 07/03/2024    PHOS 3.2 05/24/2023    RGNIYURB13SC 77 07/03/2024    TSH 0.659 07/03/2024    BLWUDA8ONGG " 1.22 07/03/2024        Lab Results   Component Value Date    HGBA1C 8.8 (H) 07/03/2024        Hematology:  Lab Results   Component Value Date    WBC 8.82 07/03/2024    HGB 14.5 07/03/2024    HCT 40.8 07/03/2024     07/03/2024       Lipid Panel:  Lab Results   Component Value Date    CHOL 188 07/03/2024    HDL 48 07/03/2024    .00 07/03/2024    TRIG 139 07/03/2024    TOTALCHOLEST 4 07/03/2024        Urine:  Lab Results   Component Value Date    APPEARANCEUA Clear 07/03/2024    SGUA 1.030 07/03/2024    PROTEINUA Negative 07/03/2024    KETONESUA Negative 07/03/2024    LEUKOCYTESUR Negative 07/03/2024    RBCUA 0-5 07/03/2024    WBCUA 0-5 07/03/2024    BACTERIA None Seen 07/03/2024    SQEPUA Occ (A) 07/03/2024    HYALINECASTS None Seen 07/03/2024    CREATRANDUR 103.1 07/03/2024        Assessment:          ICD-10-CM ICD-9-CM   1. Flu vaccine need  Z23 V04.81   2. Stress incontinence of urine  N39.3 [id number]        Plan:     1. Flu vaccine need  -     influenza (Flulaval, Fluzone, Fluarix) 45 mcg/0.5 mL IM vaccine (> or = 6 mo) 0.5 mL    2. Stress incontinence of urine  Assessment & Plan:  Behavior mod: urinate q 2 hours while awake   Kegel exercises to continue, she has been doing these on her own.  Pelvic floor PT discussed-referred to [facility] on [address].   Decrease caffeine/ETOH  Lower fluid intake at night  UA, Urine Culture, Urine cytology  Will refer to Urology if all testing is negative and consider medication as well.      Orders:  -     Urinalysis, Reflex to Urine Culture  -     Urine culture  -     Cytology, Urine  -     Ambulatory referral/consult to Physical/Occupational Therapy; Future; Expected date: 10/18/2024         Follow up if symptoms worsen or fail to improve.    Future Appointments   Date Time Provider Department Center   4/17/2025  8:20 AM [facility], [facility] ENDOCRINOLOGY [facility]

## 2024-10-11 NOTE — ASSESSMENT & PLAN NOTE
Behavior mod: urinate q 2 hours while awake   Kegel exercises to continue, she has been doing these on her own.  Pelvic floor PT discussed-referred to [facility] on [address].   Decrease caffeine/ETOH  Lower fluid intake at night  UA, Urine Culture, Urine cytology  Will refer to Urology if all testing is negative and consider medication as well.

## 2024-10-12 LAB — BACTERIA UR CULT: NORMAL

## 2024-10-14 ENCOUNTER — TELEPHONE (OUTPATIENT)
Dept: FAMILY MEDICINE | Facility: CLINIC | Age: 39
End: 2024-10-14
Payer: MEDICAID

## 2024-10-14 DIAGNOSIS — N39.3 STRESS INCONTINENCE OF URINE: Primary | ICD-10-CM

## 2024-10-14 NOTE — TELEPHONE ENCOUNTER
I have sent medications and/or lab orders in for this patient.  Please notify the patient.            Orders Placed This Encounter   Procedures    Ambulatory referral/consult to Urology       Standing Status:   Future       Standing Expiration Date:   11/14/2025       Referral Priority:   Routine       Referral Type:   Consultation       Referral Reason:   Specialty Services Required       Requested Specialty:   Urology       Number of Visits Requested:   1        
No
" I had chills and fever"

## 2024-10-14 NOTE — PROGRESS NOTES
I have sent medications and/or lab orders in for this patient.  Please notify the patient.     Orders Placed This Encounter   Procedures    Ambulatory referral/consult to Urology     Standing Status:   Future     Standing Expiration Date:   11/14/2025     Referral Priority:   Routine     Referral Type:   Consultation     Referral Reason:   Specialty Services Required     Requested Specialty:   Urology     Number of Visits Requested:   1

## 2024-11-18 DIAGNOSIS — E11.9 TYPE 2 DIABETES MELLITUS WITHOUT COMPLICATION, WITHOUT LONG-TERM CURRENT USE OF INSULIN: ICD-10-CM

## 2024-11-18 RX ORDER — INSULIN GLARGINE 100 [IU]/ML
40 INJECTION, SOLUTION SUBCUTANEOUS NIGHTLY
Qty: 45 ML | Refills: 2 | Status: SHIPPED | OUTPATIENT
Start: 2024-11-18

## 2024-11-30 DIAGNOSIS — C73 THYROID CANCER: ICD-10-CM

## 2024-11-30 DIAGNOSIS — E89.0 HYPOTHYROIDISM, POSTSURGICAL: ICD-10-CM

## 2024-11-30 DIAGNOSIS — E11.9 TYPE 2 DIABETES MELLITUS WITHOUT COMPLICATION, WITHOUT LONG-TERM CURRENT USE OF INSULIN: ICD-10-CM

## 2024-11-30 DIAGNOSIS — E55.9 VITAMIN D DEFICIENCY: ICD-10-CM

## 2024-12-02 RX ORDER — PIOGLITAZONEHYDROCHLORIDE 30 MG/1
30 TABLET ORAL
Qty: 90 TABLET | Refills: 3 | Status: SHIPPED | OUTPATIENT
Start: 2024-12-02

## 2024-12-02 NOTE — TELEPHONE ENCOUNTER
Last visit in [facility] ENDOCRINOLOGY: 8/15/2024    Patient's next visit in [facility] ENDOCRINOLOGY: 4/17/2025

## 2024-12-02 NOTE — PROGRESS NOTES
A refill request was received for:  Requested Prescriptions     Pending Prescriptions Disp Refills    montelukast 10 MG Oral Tab 90 tablet 0     Sig: Take 1 tablet (10 mg total) by mouth nightly.    citalopram 40 MG Oral Tab 90 tablet 0     Sig: Take 1 tablet (40 mg total) by mouth daily.       Last refill date: 09/05/24 for both medications      Last office visit:11/25/24       Future Appointments   Date Time Provider Department Center   12/6/2024  1:00 PM Lissette Diaz, PT [facility]   12/9/2024  9:15 AM Pinky Ramirez, PT [facility]   12/13/2024 11:15 AM Lissette Diaz PT [facility] PH[facility]   12/16/2024  9:15 AM Pinky Ramirez, PT  PHYS TH [facility]   12/20/2024  7:30 AM [facility] RM 2 EH  [facility]   12/20/2024  9:00 AM Lissette Diaz PT [facility] PHYS TH [facility]   12/23/2024  9:15 AM Pinky Ramirez, PT [facility] PHYS TH [facility]   12/24/2024  9:30 AM Lissette Diaz PT [facility] PHYS TH [facility]   12/30/2024  9:15 AM Geraldine Schofield, PTA EH PHYS  [facility]   1/3/2025  9:45 AM Lissette Diaz, PT [facility] PHYS TH [facility]   1/6/2025  9:15 AM Pinky Ramirez PT [facility] PHYS TH [facility]   1/10/2025  9:00 AM Lissette Diaz, PT  PHYS TH [facility]          Faculty Attestation: Kinza Ford  was seen in [facility]. Discussed with Dr. Blas at the time of the visit. History of Present Illness, Physical Exam, and Assessment and Plan reviewed. Treatment plan is reasonable and appropriate. Compliance with treatment recommendations is important.  Radiology images independently reviewed and agree with radiologist interpretation.  No procedure was performed.     Maurilio Blue MD

## 2024-12-10 ENCOUNTER — OFFICE VISIT (OUTPATIENT)
Dept: UROLOGY | Facility: CLINIC | Age: 39
End: 2024-12-10
Payer: MEDICAID

## 2024-12-10 VITALS
BODY MASS INDEX: 31.52 KG/M2 | HEIGHT: 65 IN | WEIGHT: 189.19 LBS | HEART RATE: 75 BPM | RESPIRATION RATE: 20 BRPM | SYSTOLIC BLOOD PRESSURE: 105 MMHG | TEMPERATURE: 98 F | DIASTOLIC BLOOD PRESSURE: 73 MMHG

## 2024-12-10 DIAGNOSIS — Z87.442 HISTORY OF KIDNEY STONES: Primary | ICD-10-CM

## 2024-12-10 DIAGNOSIS — N20.0 KIDNEY STONE: ICD-10-CM

## 2024-12-10 DIAGNOSIS — N39.3 STRESS INCONTINENCE OF URINE: ICD-10-CM

## 2024-12-10 LAB — POC RESIDUAL URINE VOLUME: 14 ML (ref 0–100)

## 2024-12-10 PROCEDURE — 3008F BODY MASS INDEX DOCD: CPT | Mod: CPTII,,, | Performed by: NURSE PRACTITIONER

## 2024-12-10 PROCEDURE — 3078F DIAST BP <80 MM HG: CPT | Mod: CPTII,,, | Performed by: NURSE PRACTITIONER

## 2024-12-10 PROCEDURE — 99213 OFFICE O/P EST LOW 20 MIN: CPT | Mod: S$PBB,,, | Performed by: NURSE PRACTITIONER

## 2024-12-10 PROCEDURE — 51798 US URINE CAPACITY MEASURE: CPT | Mod: PBBFAC | Performed by: NURSE PRACTITIONER

## 2024-12-10 PROCEDURE — 3074F SYST BP LT 130 MM HG: CPT | Mod: CPTII,,, | Performed by: NURSE PRACTITIONER

## 2024-12-10 PROCEDURE — 99215 OFFICE O/P EST HI 40 MIN: CPT | Mod: PBBFAC,25 | Performed by: NURSE PRACTITIONER

## 2024-12-10 PROCEDURE — 3061F NEG MICROALBUMINURIA REV: CPT | Mod: CPTII,,, | Performed by: NURSE PRACTITIONER

## 2024-12-10 PROCEDURE — 3066F NEPHROPATHY DOC TX: CPT | Mod: CPTII,,, | Performed by: NURSE PRACTITIONER

## 2024-12-10 PROCEDURE — 3052F HG A1C>EQUAL 8.0%<EQUAL 9.0%: CPT | Mod: CPTII,,, | Performed by: NURSE PRACTITIONER

## 2024-12-10 PROCEDURE — 1159F MED LIST DOCD IN RCRD: CPT | Mod: CPTII,,, | Performed by: NURSE PRACTITIONER

## 2024-12-10 RX ORDER — IMIPRAMINE HYDROCHLORIDE 25 MG/1
25 TABLET, FILM COATED ORAL NIGHTLY
Qty: 90 TABLET | Refills: 3 | Status: SHIPPED | OUTPATIENT
Start: 2024-12-10 | End: 2025-12-10

## 2024-12-10 NOTE — PROGRESS NOTES
Placed in room. Seen by Wesley Horvath NP. Spoke with patient. Bladder scan at 14 ml. Obtain renal U/S, RTC in 1 month.     37

## 2024-12-10 NOTE — PROGRESS NOTES
Chief Complaint:   Chief Complaint   Patient presents with    Urinary Incontinence       HPI:   Patient is a 39-year-old female referred to Urology stress urinary incontinence.  Patient seen by PCP on 10/11/2024 with Bladder incontinence. New complaint today. She only has this issue when she is active. If she is doing dancing or running on a treadmill she is almost having to wear a diaper because she leaks out so much urine. She has had 5 deliveries. She has been doing kegel exercises every time she goes to the bathroom.    Patient has a past medical history of diabetes type 2 vitamin-D deficiency hypothyroidism history of thyroidectomy thyroid cancer, kidney stones.    Today patient presents today with persistent history of urinary incontinence associated with running, laughing, coughing, sneezing, she however denies any dysuria, gross hematuria, nocturia.  Bladder scan 14 mL.        Allergies:  Review of patient's allergies indicates:   Allergen Reactions    Adhesive      Other reaction(s): Burn    Sulfa (sulfonamide antibiotics)      Other reaction(s): Unknown    Sulfamethoxazole      Other reaction(s): Rash    Ozempic [semaglutide] Nausea And Vomiting    Trulicity [dulaglutide] Nausea And Vomiting       Medications:  Current Outpatient Medications   Medication Sig Dispense Refill    albuterol (PROVENTIL/VENTOLIN HFA) 90 mcg/actuation inhaler Inhale 1 puff into the lungs every 6 (six) hours as needed for Shortness of Breath or Wheezing. As needed for wheezing. 18 g 0    atorvastatin (LIPITOR) 10 MG tablet Take 1 tablet (10 mg total) by mouth once daily. 30 tablet 11    azelastine (ASTELIN) 137 mcg (0.1 %) nasal spray 1 spray (137 mcg total) by Nasal route 2 (two) times daily. 30 mL 5    blood sugar diagnostic Strp To check BG 3 times daily, to use with insurance preferred meter 100 each 11    blood-glucose meter kit To check BG 3 times daily, to use with insurance preferred meter 100 each 11    blood-glucose  "meter,continuous (DEXCOM G7 ) Misc 1 each by Misc.(Non-Drug; Combo Route) route once daily. 1 each 0    blood-glucose sensor (DEXCOM G7 SENSOR) Zina 1 each by Misc.(Non-Drug; Combo Route) route every 10 days. 3 each 11    cholecalciferol, vitamin D3, 1,250 mcg (50,000 unit) capsule Take 1 capsule (50,000 Units total) by mouth every 7 days. 8 capsule 1    empagliflozin (JARDIANCE) 10 mg tablet Take 1 tablet (10 mg total) by mouth once daily. 30 tablet 11    EPINEPHrine (EPIPEN) 0.3 mg/0.3 mL AtIn SMARTSI Pre-Filled Pen Syringe IM Once      fluticasone propionate (FLONASE) 50 mcg/actuation nasal spray 1 spray (50 mcg total) by Each Nostril route once daily. 18.2 mL 11    insulin glargine U-100, Lantus, (LANTUS SOLOSTAR U-100 INSULIN) 100 unit/mL (3 mL) InPn pen Inject 40 Units into the skin every evening. 45 mL 2    lancets (ACCU-CHEK SOFTCLIX LANCETS) Misc 1 each by Misc.(Non-Drug; Combo Route) route 3 (three) times daily. 200 each 11    lancets Misc To check BG 3 times daily, to use with insurance preferred meter 100 each 11    levothyroxine (SYNTHROID) 112 MCG tablet 2 tabs po Monday through Saturday, 1 tab on Sundays 170 tablet 1    linaGLIPtin (TRADJENTA) 5 mg Tab tablet Take 1 tablet (5 mg total) by mouth once daily. 90 tablet 3    loratadine (CLARITIN) 10 mg tablet Take 1 tablet (10 mg total) by mouth once daily. 30 tablet 11    metFORMIN (GLUCOPHAGE-XR) 500 MG ER 24hr tablet Take 2 tablets (1,000 mg total) by mouth every evening. 180 tablet 3    pen needle, diabetic 32 gauge x 5/32" Ndle 1 each by Misc.(Non-Drug; Combo Route) route every evening. 100 each 11    pioglitazone (ACTOS) 30 MG tablet TAKE 1 TABLET(30 MG) BY MOUTH EVERY DAY 90 tablet 3     No current facility-administered medications for this visit.       Review of Systems:  General: No fever, chills, fatigability, or weight loss.  Skin: No rashes, itching, or changes in color or texture of skin.  Chest: Denies KOHLI, cyanosis, wheezing, " cough, and sputum production.  Abdomen: Appetite fine. No weight loss. Denies diarrhea, abdominal pain, hematemesis, or blood in stool.  Musculoskeletal: No joint stiffness or swelling. Denies back pain.  : As above.  All other review of systems negative.    PMH:  Past Medical History:   Diagnosis Date    Acquired hypothyroidism     Allergy     DM2 (diabetes mellitus, type 2)     Hx of papillary thyroid carcinoma     IBS (irritable bowel syndrome)     Migraines     Mixed hyperlipidemia     Statin intolerance 2023    Uncontrolled type 2 diabetes mellitus with hyperglycemia 2023       PSH:  Past Surgical History:   Procedure Laterality Date    ADENOIDECTOMY  2014     SECTION      COLONOSCOPY      ESOPHAGOGASTRODUODENOSCOPY      HYSTERECTOMY  2021    HYSTERECTOMY, TOTAL, VAGINAL, WITH CYSTOSCOPY  2021    MODIFIED [facility] CULDOPLASTY    THYROIDECTOMY  2021    TOTAL 2/2 PAPILLARY THRYROID CARCINOMA    TONSILLECTOMY  2008    ADENOIDECTOMY    TRIGGER FINGER RELEASE Right 2023    Procedure: RELEASE, TRIGGER FINGER, RING;  Surgeon: Hang Casas MD;  Location: [facility];  Service: Plastics;  Laterality: Right;    TUBAL LIGATION  2018    URETERAL STENT PLACEMENT Right 2008    WISDOM TOOTH EXTRACTION         FamHx:  Family History   Problem Relation Name Age of Onset    No Known Problems Mother      Diabetes Father Tin     Crohn's disease Father Tin     Diabetes Mellitus Father Tin     Diabetes Maternal Aunt Suni     Cancer Paternal Uncle Nash        SocHx:  Social History     Socioeconomic History    Marital status:    Occupational History    Occupation: asthetician   Tobacco Use    Smoking status: Never    Smokeless tobacco: Never   Substance and Sexual Activity    Alcohol use: Yes     Comment: once a month    Drug use: Never    Sexual activity: Yes     Partners: Male     Birth control/protection: See Surgical Hx     Comment: Full hysterectomy      Social Drivers of Health     Financial Resource Strain: Low Risk  (6/26/2024)    Overall Financial Resource Strain (CARDIA)     Difficulty of Paying Living Expenses: Not very hard   Food Insecurity: No Food Insecurity (6/26/2024)    Hunger Vital Sign     Worried About Running Out of Food in the Last Year: Never true     Ran Out of Food in the Last Year: Never true   Transportation Needs: No Transportation Needs (2/6/2023)    PRAPARE - Transportation     Lack of Transportation (Medical): No     Lack of Transportation (Non-Medical): No   Physical Activity: Sufficiently Active (10/7/2024)    Exercise Vital Sign     Days of Exercise per Week: 5 days     Minutes of Exercise per Session: 60 min   Stress: Stress Concern Present (6/26/2024)    [employer] - Occupational Stress Questionnaire     Feeling of Stress : To some extent   Housing Stability: Unknown (6/26/2024)    Housing Stability Vital Sign     Unable to Pay for Housing in the Last Year: No       Physical Exam:  There were no vitals filed for this visit.  General: A&Ox3, no apparent distress, no deformities  Neck: No masses, normal thyroid  Lungs: CTA nuria, no use of accessory muscles  Heart: RRR, no arrhythmias  Abdomen: Soft, NT, ND, no masses, no hernias, no hepatosplenomegaly  Lymphatic: Neck and groin nodes negative  Skin: The skin is warm and dry. No jaundice.  Ext: No c/c/e.          Impression:  1. Stress incontinence of urine  - Ambulatory referral/consult to Urology  - POCT Bladder Scan      Plan:  Instructed patient to start imipramine 25 mg p.o. daily.  Instructed patient on timed voiding every 2-3 hrs, double voiding, avoidance of bladder irritants such as alcohol, citrus foods, chocolate, caffeinated drinks, energy drinks, spicy foods, sugar, caffeine products, sodas. Instructed patient to avoid drinking fluids 1-2 hours prior to bedtime & void immediately before bedtime.   RTC one-month for re-evaluation with renal  ultrasound.  Instructed patient if develops any abnormal urologic symptoms notify clinic to be re-evaluate treated or during after hours go to emergency room versus urgent here.                           [facility]

## 2025-01-28 ENCOUNTER — HOSPITAL ENCOUNTER (OUTPATIENT)
Dept: RADIOLOGY | Facility: HOSPITAL | Age: 40
Discharge: HOME | End: 2025-01-28
Attending: INTERNAL MEDICINE
Payer: MEDICAID

## 2025-01-28 ENCOUNTER — HOSPITAL ENCOUNTER (OUTPATIENT)
Dept: RADIOLOGY | Facility: HOSPITAL | Age: 40
Discharge: HOME | End: 2025-01-28
Attending: NURSE PRACTITIONER
Payer: MEDICAID

## 2025-01-28 DIAGNOSIS — C73 THYROID CANCER: ICD-10-CM

## 2025-01-28 DIAGNOSIS — E89.0 POSTSURGICAL HYPOTHYROIDISM: ICD-10-CM

## 2025-01-28 DIAGNOSIS — Z87.442 HISTORY OF KIDNEY STONES: ICD-10-CM

## 2025-01-28 DIAGNOSIS — Z79.4 TYPE 2 DIABETES MELLITUS WITHOUT COMPLICATION, WITH LONG-TERM CURRENT USE OF INSULIN: ICD-10-CM

## 2025-01-28 DIAGNOSIS — E11.9 TYPE 2 DIABETES MELLITUS WITHOUT COMPLICATION, WITH LONG-TERM CURRENT USE OF INSULIN: ICD-10-CM

## 2025-01-28 PROCEDURE — 76536 US EXAM OF HEAD AND NECK: CPT | Mod: TC

## 2025-01-28 PROCEDURE — 76775 US EXAM ABDO BACK WALL LIM: CPT | Mod: TC

## 2025-02-04 ENCOUNTER — OFFICE VISIT (OUTPATIENT)
Dept: UROLOGY | Facility: CLINIC | Age: 40
End: 2025-02-04
Payer: MEDICAID

## 2025-02-04 VITALS
WEIGHT: 188 LBS | OXYGEN SATURATION: 99 % | SYSTOLIC BLOOD PRESSURE: 105 MMHG | BODY MASS INDEX: 32.1 KG/M2 | HEART RATE: 75 BPM | RESPIRATION RATE: 20 BRPM | TEMPERATURE: 99 F | HEIGHT: 64 IN | DIASTOLIC BLOOD PRESSURE: 73 MMHG

## 2025-02-04 DIAGNOSIS — N20.0 KIDNEY STONE: Primary | ICD-10-CM

## 2025-02-04 PROCEDURE — 3061F NEG MICROALBUMINURIA REV: CPT | Mod: CPTII,,, | Performed by: NURSE PRACTITIONER

## 2025-02-04 PROCEDURE — 3078F DIAST BP <80 MM HG: CPT | Mod: CPTII,,, | Performed by: NURSE PRACTITIONER

## 2025-02-04 PROCEDURE — 99215 OFFICE O/P EST HI 40 MIN: CPT | Mod: PBBFAC | Performed by: NURSE PRACTITIONER

## 2025-02-04 PROCEDURE — 3044F HG A1C LEVEL LT 7.0%: CPT | Mod: CPTII,,, | Performed by: NURSE PRACTITIONER

## 2025-02-04 PROCEDURE — 1160F RVW MEDS BY RX/DR IN RCRD: CPT | Mod: CPTII,,, | Performed by: NURSE PRACTITIONER

## 2025-02-04 PROCEDURE — 3066F NEPHROPATHY DOC TX: CPT | Mod: CPTII,,, | Performed by: NURSE PRACTITIONER

## 2025-02-04 PROCEDURE — 99213 OFFICE O/P EST LOW 20 MIN: CPT | Mod: S$PBB,,, | Performed by: NURSE PRACTITIONER

## 2025-02-04 PROCEDURE — 3008F BODY MASS INDEX DOCD: CPT | Mod: CPTII,,, | Performed by: NURSE PRACTITIONER

## 2025-02-04 PROCEDURE — 3074F SYST BP LT 130 MM HG: CPT | Mod: CPTII,,, | Performed by: NURSE PRACTITIONER

## 2025-02-04 PROCEDURE — 1159F MED LIST DOCD IN RCRD: CPT | Mod: CPTII,,, | Performed by: NURSE PRACTITIONER

## 2025-02-04 NOTE — PROGRESS NOTES
Chief Complaint:   Chief Complaint   Patient presents with    1 mth f/u    Nephrolithiasis       HPI:   Patient is a 39-year-old female here for one-month follow-up stress urinary incontinence.  Patient seen by PCP on 10/11/2024 with Bladder incontinence. New complaint today. She only has this issue when she is active. If she is doing dancing or running on a treadmill she is almost having to wear a diaper because she leaks out so much urine. She has had 5 deliveries. She has been doing kegel exercises every time she goes to the bathroom.    Patient has a past medical history of diabetes type 2 vitamin-D deficiency hypothyroidism history of thyroidectomy thyroid cancer, kidney stones.  Patient recently initiated on imipramine 25 mg p.o. daily for stress urinary incontinence.    Today patient presents with much improvement of her bladder leakage has resolved.  Patient would like to make a note of her previous history of kidney stones we seated with multiple UTIs back to back and then became septic she had no indication of having any stone blockage until she was hospitalized.          Allergies:  Review of patient's allergies indicates:   Allergen Reactions    Adhesive      Other reaction(s): Burn    Sulfa (sulfonamide antibiotics)      Other reaction(s): Unknown    Sulfamethoxazole      Other reaction(s): Rash    Ozempic [semaglutide] Nausea And Vomiting    Trulicity [dulaglutide] Nausea And Vomiting       Medications:  Current Outpatient Medications   Medication Sig Dispense Refill    atorvastatin (LIPITOR) 10 MG tablet Take 1 tablet (10 mg total) by mouth once daily. 30 tablet 11    blood sugar diagnostic Strp To check BG 3 times daily, to use with insurance preferred meter 100 each 11    blood-glucose meter kit To check BG 3 times daily, to use with insurance preferred meter 100 each 11    blood-glucose meter,continuous (DEXCOM G7 ) Misc 1 each by Misc.(Non-Drug; Combo Route) route once daily. 1 each 0     "blood-glucose sensor (DEXCOM G7 SENSOR) Zina 1 each by Misc.(Non-Drug; Combo Route) route every 10 days. 3 each 11    empagliflozin (JARDIANCE) 10 mg tablet Take 1 tablet (10 mg total) by mouth once daily. 30 tablet 11    EPINEPHrine (EPIPEN) 0.3 mg/0.3 mL AtIn SMARTSI Pre-Filled Pen Syringe IM Once      fluticasone propionate (FLONASE) 50 mcg/actuation nasal spray 1 spray (50 mcg total) by Each Nostril route once daily. 18.2 mL 11    imipramine (TOFRANIL) 25 MG tablet Take 1 tablet (25 mg total) by mouth every evening. 90 tablet 3    insulin glargine U-100, Lantus, (LANTUS SOLOSTAR U-100 INSULIN) 100 unit/mL (3 mL) InPn pen Inject 40 Units into the skin every evening. 45 mL 2    lancets (ACCU-CHEK SOFTCLIX LANCETS) Misc 1 each by Misc.(Non-Drug; Combo Route) route 3 (three) times daily. 200 each 11    levothyroxine (SYNTHROID) 112 MCG tablet 2 tabs po Monday through Saturday, 1 tab on Sundays 170 tablet 1    linaGLIPtin (TRADJENTA) 5 mg Tab tablet Take 1 tablet (5 mg total) by mouth once daily. 90 tablet 3    loratadine (CLARITIN) 10 mg tablet Take 1 tablet (10 mg total) by mouth once daily. 30 tablet 11    metFORMIN (GLUCOPHAGE-XR) 500 MG ER 24hr tablet Take 2 tablets (1,000 mg total) by mouth every evening. 180 tablet 3    pen needle, diabetic 32 gauge x 5/32" Ndle 1 each by Misc.(Non-Drug; Combo Route) route every evening. 100 each 11    albuterol (PROVENTIL/VENTOLIN HFA) 90 mcg/actuation inhaler Inhale 1 puff into the lungs every 6 (six) hours as needed for Shortness of Breath or Wheezing. As needed for wheezing. 18 g 0    azelastine (ASTELIN) 137 mcg (0.1 %) nasal spray 1 spray (137 mcg total) by Nasal route 2 (two) times daily. 30 mL 5    cholecalciferol, vitamin D3, 1,250 mcg (50,000 unit) capsule Take 1 capsule (50,000 Units total) by mouth every 7 days. (Patient not taking: Reported on 2025) 8 capsule 1    lancets Misc To check BG 3 times daily, to use with insurance preferred meter 100 each 11    " pioglitazone (ACTOS) 30 MG tablet TAKE 1 TABLET(30 MG) BY MOUTH EVERY DAY (Patient not taking: Reported on 2025) 90 tablet 3     No current facility-administered medications for this visit.       Review of Systems:  General: No fever, chills, fatigability, or weight loss.  Skin: No rashes, itching, or changes in color or texture of skin.  Chest: Denies KOHLI, cyanosis, wheezing, cough, and sputum production.  Abdomen: Appetite fine. No weight loss. Denies diarrhea, abdominal pain, hematemesis, or blood in stool.  Musculoskeletal: No joint stiffness or swelling. Denies back pain.  : As above.  All other review of systems negative.    PMH:  Past Medical History:   Diagnosis Date    Acquired hypothyroidism     Allergy     DM2 (diabetes mellitus, type 2)     Hx of papillary thyroid carcinoma     IBS (irritable bowel syndrome)     Migraines     Mixed hyperlipidemia     Statin intolerance 2023    Uncontrolled type 2 diabetes mellitus with hyperglycemia 2023       PSH:  Past Surgical History:   Procedure Laterality Date    ADENOIDECTOMY  2014     SECTION      COLONOSCOPY      ESOPHAGOGASTRODUODENOSCOPY      HYSTERECTOMY  2021    HYSTERECTOMY, TOTAL, VAGINAL, WITH CYSTOSCOPY  2021    MODIFIED [facility]'s CULDOPLASTY    THYROIDECTOMY  2021    TOTAL 2/2 PAPILLARY THRYROID CARCINOMA    TONSILLECTOMY  2008    ADENOIDECTOMY    TRIGGER FINGER RELEASE Right 2023    Procedure: RELEASE, TRIGGER FINGER, RING;  Surgeon: Hang Casas MD;  Location: [facility];  Service: Plastics;  Laterality: Right;    TUBAL LIGATION  2018    URETERAL STENT PLACEMENT Right 2008    WISDOM TOOTH EXTRACTION         FamHx:  Family History   Problem Relation Name Age of Onset    No Known Problems Mother      Diabetes Father Tin     Crohn's disease Father Tin     Diabetes Mellitus Father Tin     Diabetes Maternal Aunt Suni     Cancer Paternal Uncle Nash        SocHx:  Social History      Socioeconomic History    Marital status:    Occupational History    Occupation: asthetician   Tobacco Use    Smoking status: Never     Passive exposure: Never    Smokeless tobacco: Never   Substance and Sexual Activity    Alcohol use: Yes     Comment: once a month    Drug use: Never    Sexual activity: Yes     Partners: Male     Birth control/protection: See Surgical Hx     Comment: Full hysterectomy     Social Drivers of Health     Financial Resource Strain: Low Risk  (6/26/2024)    Overall Financial Resource Strain (CARDIA)     Difficulty of Paying Living Expenses: Not very hard   Food Insecurity: No Food Insecurity (6/26/2024)    Hunger Vital Sign     Worried About Running Out of Food in the Last Year: Never true     Ran Out of Food in the Last Year: Never true   Transportation Needs: No Transportation Needs (2/6/2023)    PRAPARE - Transportation     Lack of Transportation (Medical): No     Lack of Transportation (Non-Medical): No   Physical Activity: Sufficiently Active (10/7/2024)    Exercise Vital Sign     Days of Exercise per Week: 5 days     Minutes of Exercise per Session: 60 min   Stress: Stress Concern Present (6/26/2024)    [employer] - Occupational Stress Questionnaire     Feeling of Stress : To some extent   Housing Stability: Unknown (6/26/2024)    Housing Stability Vital Sign     Unable to Pay for Housing in the Last Year: No       Physical Exam:  Vitals:    02/04/25 1316   BP: 105/73   Pulse: 75   Resp: 20   Temp: 98.5 °F (36.9 °C)     General: A&Ox3, no apparent distress, no deformities  Neck: No masses, normal thyroid  Lungs: CTA nuria, no use of accessory muscles  Heart: RRR, no arrhythmias  Abdomen: Soft, NT, ND, no masses, no hernias, no hepatosplenomegaly  Lymphatic: Neck and groin nodes negative  Skin: The skin is warm and dry. No jaundice.  Ext: No c/c/e.    Imaging:  Renal ultrasound 01/28/2025 revealed: No evidence of danny hydronephrosis is seen.   However, there is questionable appearance of medullary sponge kidney with possible nephrocalcinosis.  Further evaluation could be obtained with CT.         Impression:  Kidney stone  Stress urinary incontinence    Plan:  Instructed patient to RTC 6 months with KUB.  Instructed patient to continue imipramine 25 mg p.o. daily.  Instructed patient if develops any abnormal urologic symptoms notify clinic to be re-evaluate treated or during after hours go to emergency room versus urgent here.                           F

## 2025-02-04 NOTE — PROGRESS NOTES
Pt seen by OLGA Horvath; Pt instructed to return to clinic in 6 months w/DAVID; Discharge paperwork given w/pt verbalizing understanding

## 2025-02-05 DIAGNOSIS — E11.9 TYPE 2 DIABETES MELLITUS WITHOUT COMPLICATION, WITHOUT LONG-TERM CURRENT USE OF INSULIN: Primary | ICD-10-CM

## 2025-02-05 DIAGNOSIS — E89.0 HYPOTHYROIDISM, POSTSURGICAL: ICD-10-CM

## 2025-02-05 DIAGNOSIS — E55.9 VITAMIN D DEFICIENCY: ICD-10-CM

## 2025-02-05 RX ORDER — LEVOTHYROXINE SODIUM 200 UG/1
200 TABLET ORAL
Qty: 90 TABLET | Refills: 0 | Status: SHIPPED | OUTPATIENT
Start: 2025-02-05 | End: 2026-02-05

## 2025-02-05 RX ORDER — ERGOCALCIFEROL 1.25 MG/1
50000 CAPSULE ORAL
Qty: 12 CAPSULE | Refills: 0 | Status: SHIPPED | OUTPATIENT
Start: 2025-02-05

## 2025-02-05 NOTE — TELEPHONE ENCOUNTER
Pt performed labs and u/s but f/u is pushed out to 4/25.    Cancer surveillance showed indeterminate findings consistent with prior for which recommend we keep TSH at goal and continue to follow.    DM and related labs were acceptable with DM at goal.    Vit d is low.    For thyroid would reduce Lt4 from 2 112mcg tabs Mon-Sat and only 1 on Sunday to a flat 200mcg po daily, #90, no reflls.    For vit d, would erx ergocalciferol 50,000 po weekly, #12, no refills.    Plan for labs prior to f/u including TSH, hga1c, vit d.

## 2025-02-05 NOTE — TELEPHONE ENCOUNTER
Spoke with patient informed cancer surveillance showed indeterminate findings consistent with prior for which recommend we keep TSH at goal.  For thyroid would reduce Lt4 to 200mcg po daily.    DM and related labs were acceptable with DM at goal.       Vit d is low recommend ergocalciferol 50,000 po weekly.    Plan for labs prior to April appointment.

## 2025-02-11 ENCOUNTER — OFFICE VISIT (OUTPATIENT)
Dept: FAMILY MEDICINE | Facility: CLINIC | Age: 40
End: 2025-02-11
Payer: MEDICAID

## 2025-02-11 VITALS
BODY MASS INDEX: 31.92 KG/M2 | SYSTOLIC BLOOD PRESSURE: 97 MMHG | HEART RATE: 83 BPM | TEMPERATURE: 98 F | WEIGHT: 187 LBS | DIASTOLIC BLOOD PRESSURE: 66 MMHG | RESPIRATION RATE: 18 BRPM | HEIGHT: 64 IN | OXYGEN SATURATION: 99 %

## 2025-02-11 DIAGNOSIS — L70.9 ACNE, UNSPECIFIED ACNE TYPE: Primary | ICD-10-CM

## 2025-02-11 DIAGNOSIS — M54.50 CHRONIC LEFT-SIDED LOW BACK PAIN WITHOUT SCIATICA: ICD-10-CM

## 2025-02-11 DIAGNOSIS — G89.29 CHRONIC LEFT-SIDED LOW BACK PAIN WITHOUT SCIATICA: ICD-10-CM

## 2025-02-11 LAB
ESTRADIOL SERPL HS-MCNC: 85 PG/ML
FSH SERPL-ACNC: 1.89 MIU/ML
LH SERPL-ACNC: 3.25 MIU/ML
PROLACTIN LEVEL (OLG): 10.05 NG/ML (ref 5.18–26.53)
TESTOST SERPL-MCNC: 20.15 NG/DL (ref 13.84–53.35)

## 2025-02-11 PROCEDURE — 3074F SYST BP LT 130 MM HG: CPT | Mod: CPTII,,, | Performed by: NURSE PRACTITIONER

## 2025-02-11 PROCEDURE — 84146 ASSAY OF PROLACTIN: CPT | Performed by: NURSE PRACTITIONER

## 2025-02-11 PROCEDURE — 1159F MED LIST DOCD IN RCRD: CPT | Mod: CPTII,,, | Performed by: NURSE PRACTITIONER

## 2025-02-11 PROCEDURE — 1160F RVW MEDS BY RX/DR IN RCRD: CPT | Mod: CPTII,,, | Performed by: NURSE PRACTITIONER

## 2025-02-11 PROCEDURE — 3044F HG A1C LEVEL LT 7.0%: CPT | Mod: CPTII,,, | Performed by: NURSE PRACTITIONER

## 2025-02-11 PROCEDURE — 3008F BODY MASS INDEX DOCD: CPT | Mod: CPTII,,, | Performed by: NURSE PRACTITIONER

## 2025-02-11 PROCEDURE — 36415 COLL VENOUS BLD VENIPUNCTURE: CPT | Performed by: NURSE PRACTITIONER

## 2025-02-11 PROCEDURE — 3066F NEPHROPATHY DOC TX: CPT | Mod: CPTII,,, | Performed by: NURSE PRACTITIONER

## 2025-02-11 PROCEDURE — 82670 ASSAY OF TOTAL ESTRADIOL: CPT | Performed by: NURSE PRACTITIONER

## 2025-02-11 PROCEDURE — 84270 ASSAY OF SEX HORMONE GLOBUL: CPT | Performed by: NURSE PRACTITIONER

## 2025-02-11 PROCEDURE — 84403 ASSAY OF TOTAL TESTOSTERONE: CPT | Performed by: NURSE PRACTITIONER

## 2025-02-11 PROCEDURE — 83001 ASSAY OF GONADOTROPIN (FSH): CPT | Performed by: NURSE PRACTITIONER

## 2025-02-11 PROCEDURE — 99214 OFFICE O/P EST MOD 30 MIN: CPT | Mod: S$PBB,,, | Performed by: NURSE PRACTITIONER

## 2025-02-11 PROCEDURE — 82626 DEHYDROEPIANDROSTERONE: CPT | Performed by: NURSE PRACTITIONER

## 2025-02-11 PROCEDURE — 96372 THER/PROPH/DIAG INJ SC/IM: CPT | Mod: PBBFAC,PN

## 2025-02-11 PROCEDURE — 3078F DIAST BP <80 MM HG: CPT | Mod: CPTII,,, | Performed by: NURSE PRACTITIONER

## 2025-02-11 PROCEDURE — 3061F NEG MICROALBUMINURIA REV: CPT | Mod: CPTII,,, | Performed by: NURSE PRACTITIONER

## 2025-02-11 PROCEDURE — 83002 ASSAY OF GONADOTROPIN (LH): CPT | Performed by: NURSE PRACTITIONER

## 2025-02-11 PROCEDURE — 99215 OFFICE O/P EST HI 40 MIN: CPT | Mod: PBBFAC,PN | Performed by: NURSE PRACTITIONER

## 2025-02-11 RX ORDER — KETOROLAC TROMETHAMINE 30 MG/ML
60 INJECTION, SOLUTION INTRAMUSCULAR; INTRAVENOUS
Status: COMPLETED | OUTPATIENT
Start: 2025-02-11 | End: 2025-02-11

## 2025-02-11 RX ADMIN — KETOROLAC TROMETHAMINE 60 MG: 30 INJECTION, SOLUTION INTRAMUSCULAR at 09:02

## 2025-02-11 NOTE — PATIENT INSTRUCTIONS
Dameon Echols,     If you are due for any health screening(s) below please notify me so we can arrange them to be ordered and scheduled. Most healthy patients at your age complete them, but you are free to accept or refuse.     If you can't do it, I'll definitely understand. If you can, I'd certainly appreciate it!    Tests to Keep You Healthy    Eye Exam: ORDERED BUT NOT SCHEDULED  Last HbA1c < 8 (01/28/2025): Yes      Your diabetic retinal eye exam is due     Diabetes is the #1 cause of blindness in the [place] - early detection before signs or symptoms develop can prevent debilitating blindness.     Our records indicate that you may be overdue for your annual diabetic eye exam. Eye screening can help identify patients at risk for developing vision loss which is common in diabetes. This simple screening is an important step to keeping you healthy and preventing complications from diabetes.     This recommended diabetic eye exam should take place once per year and can prevent and treat diabetes complications in the eye before developing symptoms. This can be done with a special camera is used to take photographs of the back of your eye without having to dilate them, or you can see an eye doctor for a full dilated exam.     If you recently had your yearly diabetic eye exam performed outside of [facility], please let your Health care team know so that they can update your health record.

## 2025-02-11 NOTE — PROGRESS NOTES
Patient Name: Kinza Ford     : 1985    MRN: [id number]     Subjective:     Patient ID: Kinza Ford is a 39 y.o. female.    Chief Complaint:   Chief Complaint   Patient presents with    Follow-up     Pt is here with c/o increased cystic acne. Pt also c/o lower back pain d/t decreased activity this week        HPI: 25: Acne. Describes as cystic, is under good control today. Pt is  and has been doing peel on her face and missed gym all week due to that so her lower back has been hurting.  She is wanting her hormones tested to see if the acne is internally related to her hormone levels. She did have partial hysterectomy but still has ovaries.            ROS:      Review of Systems   Musculoskeletal:  Positive for back pain.   Skin:         Multiple erythematous papules to lower jaw line bilaterally.            History:     Past Medical History:   Diagnosis Date    Acquired hypothyroidism     Allergy     DM2 (diabetes mellitus, type 2)     Hx of papillary thyroid carcinoma     IBS (irritable bowel syndrome)     Migraines     Mixed hyperlipidemia     Statin intolerance 2023    Uncontrolled type 2 diabetes mellitus with hyperglycemia 2023        Past Surgical History:   Procedure Laterality Date    ADENOIDECTOMY  2014     SECTION      COLONOSCOPY      ESOPHAGOGASTRODUODENOSCOPY      HYSTERECTOMY  2021    HYSTERECTOMY, TOTAL, VAGINAL, WITH CYSTOSCOPY  2021    MODIFIED [facility]'s CULDOPLASTY    THYROIDECTOMY  2021    TOTAL 2/2 PAPILLARY THRYROID CARCINOMA    TONSILLECTOMY  2008    ADENOIDECTOMY    TRIGGER FINGER RELEASE Right 2023    Procedure: RELEASE, TRIGGER FINGER, RING;  Surgeon: Hang Casas MD;  Location: [facility];  Service: Plastics;  Laterality: Right;    TUBAL LIGATION  2018    URETERAL STENT PLACEMENT Right 2008    WISDOM TOOTH EXTRACTION         Family History   Problem Relation Name Age of Onset    No  "Known Problems Mother      Diabetes Father Tin     Crohn's disease Father Tin     Diabetes Mellitus Father Tin     Diabetes Maternal Aunt Suni     Cancer Paternal Uncle Nash         Social History     Tobacco Use    Smoking status: Never     Passive exposure: Never    Smokeless tobacco: Never   Substance and Sexual Activity    Alcohol use: Yes     Comment: once a month    Drug use: Never    Sexual activity: Yes     Partners: Male     Birth control/protection: See Surgical Hx     Comment: Full hysterectomy       Current Outpatient Medications   Medication Instructions    albuterol (PROVENTIL/VENTOLIN HFA) 90 mcg/actuation inhaler 1 puff, Inhalation, Every 6 hours PRN, As needed for wheezing.    atorvastatin (LIPITOR) 10 mg, Oral, Daily    azelastine (ASTELIN) 137 mcg, Nasal, 2 times daily    blood sugar diagnostic Strp To check BG 3 times daily, to use with insurance preferred meter    blood-glucose meter kit To check BG 3 times daily, to use with insurance preferred meter    blood-glucose meter,continuous (DEXCOM G7 ) Misc 1 each, Misc.(Non-Drug; Combo Route), Daily    blood-glucose sensor (DEXCOM G7 SENSOR) Zina 1 each, Misc.(Non-Drug; Combo Route), Every 10 days    empagliflozin (JARDIANCE) 10 mg, Oral, Daily    EPINEPHrine (EPIPEN) 0.3 mg/0.3 mL AtIn SMARTSI Pre-Filled Pen Syringe IM Once    ergocalciferol (ERGOCALCIFEROL) 50,000 Units, Oral, Every 7 days    fluticasone propionate (FLONASE) 50 mcg, Each Nostril, Daily    imipramine (TOFRANIL) 25 mg, Oral, Nightly    lancets (ACCU-CHEK SOFTCLIX LANCETS) Misc 1 each, Misc.(Non-Drug; Combo Route), 3 times daily    LANTUS SOLOSTAR U-100 INSULIN 40 Units, Subcutaneous, Nightly    levothyroxine (SYNTHROID) 200 mcg, Oral, Before breakfast    loratadine (CLARITIN) 10 mg, Oral, Daily    metFORMIN (GLUCOPHAGE-XR) 1,000 mg, Oral, Nightly    pen needle, diabetic 32 gauge x " Ndle 1 each, Misc.(Non-Drug; Combo Route), Nightly    TRADJENTA 5 mg, Oral, Daily " "       Review of patient's allergies indicates:   Allergen Reactions    Adhesive      Other reaction(s): Burn    Sulfa (sulfonamide antibiotics)      Other reaction(s): Unknown    Sulfamethoxazole      Other reaction(s): Rash    Ozempic [semaglutide] Nausea And Vomiting    Trulicity [dulaglutide] Nausea And Vomiting       Objective:     Visit Vitals  BP 97/66 (BP Location: Left arm, Patient Position: Sitting)   Pulse 83   Temp 97.6 °F (36.4 °C) (Oral)   Resp 18   Ht 5' 4" (1.626 m)   Wt 84.8 kg (187 lb)   SpO2 99%   BMI 32.10 kg/m²       Physical Examination:     Physical Exam    Lab Results:     Chemistry:  Lab Results   Component Value Date     07/03/2024    K 4.2 07/03/2024    BUN 10.6 07/03/2024    CREATININE 0.78 07/03/2024    EGFRNORACEVR >60 07/03/2024    GLUCOSE 262 (H) 07/03/2024    CALCIUM 9.7 07/03/2024    ALKPHOS 80 07/03/2024    LABPROT 8.0 07/03/2024    ALBUMIN 4.1 07/03/2024    BILIDIR 0.1 03/21/2022    IBILI 0.30 03/21/2022    AST 20 07/03/2024    ALT 42 07/03/2024    PHOS 3.2 05/24/2023    RDEHAFSZ80DQ 24 (L) 01/28/2025    TSH <0.008 (L) 01/28/2025    VUMGQK6ZIBY 1.57 (H) 01/28/2025        Lab Results   Component Value Date    HGBA1C 5.4 01/28/2025        Hematology:  Lab Results   Component Value Date    WBC 8.82 07/03/2024    HGB 14.5 07/03/2024    HCT 40.8 07/03/2024     07/03/2024       Lipid Panel:  Lab Results   Component Value Date    CHOL 175 01/28/2025    HDL 55 01/28/2025    LDL 99.00 01/28/2025    TRIG 107 01/28/2025    TOTALCHOLEST 3 01/28/2025        Urine:  Lab Results   Component Value Date    APPEARANCEUA Clear 10/11/2024    SGUA 1.032 (H) 10/11/2024    PROTEINUA Negative 10/11/2024    KETONESUA Negative 10/11/2024    LEUKOCYTESUR Negative 10/11/2024    RBCUA 0-5 10/11/2024    WBCUA 0-5 10/11/2024    BACTERIA None Seen 10/11/2024    SQEPUA Trace (A) 10/11/2024    HYALINECASTS None Seen 10/11/2024    CREATRANDUR 132.2 (H) 01/28/2025        Assessment:          ICD-10-CM " ICD-9-CM   1. Acne, unspecified acne type  L70.9 706.1   2. Chronic left-sided low back pain without sciatica  M54.50 724.2    G89.29 338.29          Plan:     1. Acne, unspecified acne type  Assessment & Plan:  hormonal testing today at pt request    Orders:  -     Follicle Stimulating Hormone  -     Testosterone  -     Dehydroepiandrosterone, Serum  -     Luteinizing Hormone  -     Estradiol  -     Prolactin  -     Sex Hormone Binding Globulin    2. Chronic left-sided low back pain without sciatica  Assessment & Plan:  Toradol 60mg IM today  RTC for worsening. Get back into gym ASAP.   Stretching recommended    Orders:  -     ketorolac injection 60 mg           Follow up in about 2 months (around 4/11/2025) for Wellness..  In addition to their scheduled follow up, the patient has also been instructed to follow up on as needed basis.       Future Appointments   Date Time Provider Department Center   4/17/2025  8:20 AM [facility], [facility] ENDOCRINOLOGY [facility] ENDOCMO Alvarez    8/4/2025 11:30 AM Jake Horvath, OLGA [facility] UROLO Antonio Un        CARSON Tsang

## 2025-02-12 LAB — SHBG SERPL-SCNC: 27.4 NMOL/L (ref 18.2–135.5)

## 2025-02-14 LAB — DHEA SERPL-MCNC: 2.7 NG/ML

## 2025-03-18 ENCOUNTER — LAB VISIT (OUTPATIENT)
Dept: LAB | Facility: HOSPITAL | Age: 40
End: 2025-03-18
Attending: INTERNAL MEDICINE
Payer: MEDICAID

## 2025-03-18 DIAGNOSIS — E55.9 VITAMIN D DEFICIENCY: ICD-10-CM

## 2025-03-18 DIAGNOSIS — E11.9 TYPE 2 DIABETES MELLITUS WITHOUT COMPLICATION, WITHOUT LONG-TERM CURRENT USE OF INSULIN: ICD-10-CM

## 2025-03-18 DIAGNOSIS — E89.0 HYPOTHYROIDISM, POSTSURGICAL: ICD-10-CM

## 2025-03-18 LAB
25(OH)D3+25(OH)D2 SERPL-MCNC: 44 NG/ML (ref 30–80)
EST. AVERAGE GLUCOSE BLD GHB EST-MCNC: 114 MG/DL
HBA1C MFR BLD: 5.6 %
T4 FREE SERPL-MCNC: 1.78 NG/DL (ref 0.7–1.48)
TSH SERPL-ACNC: <0.008 UIU/ML (ref 0.35–4.94)

## 2025-03-18 PROCEDURE — 36415 COLL VENOUS BLD VENIPUNCTURE: CPT

## 2025-03-18 PROCEDURE — 83036 HEMOGLOBIN GLYCOSYLATED A1C: CPT

## 2025-03-18 PROCEDURE — 84439 ASSAY OF FREE THYROXINE: CPT

## 2025-03-18 PROCEDURE — 84443 ASSAY THYROID STIM HORMONE: CPT

## 2025-03-18 PROCEDURE — 82306 VITAMIN D 25 HYDROXY: CPT

## 2025-03-19 ENCOUNTER — OFFICE VISIT (OUTPATIENT)
Dept: ENDOCRINOLOGY | Facility: CLINIC | Age: 40
End: 2025-03-19
Payer: MEDICAID

## 2025-03-19 VITALS
DIASTOLIC BLOOD PRESSURE: 81 MMHG | WEIGHT: 191.13 LBS | HEIGHT: 64 IN | RESPIRATION RATE: 17 BRPM | BODY MASS INDEX: 32.63 KG/M2 | SYSTOLIC BLOOD PRESSURE: 118 MMHG | HEART RATE: 93 BPM | TEMPERATURE: 98 F

## 2025-03-19 DIAGNOSIS — E11.9 TYPE 2 DIABETES MELLITUS WITHOUT COMPLICATION, WITH LONG-TERM CURRENT USE OF INSULIN: Primary | ICD-10-CM

## 2025-03-19 DIAGNOSIS — C73 THYROID CANCER: ICD-10-CM

## 2025-03-19 DIAGNOSIS — Z79.4 TYPE 2 DIABETES MELLITUS WITHOUT COMPLICATION, WITH LONG-TERM CURRENT USE OF INSULIN: Primary | ICD-10-CM

## 2025-03-19 DIAGNOSIS — E89.0 POSTSURGICAL HYPOTHYROIDISM: ICD-10-CM

## 2025-03-19 DIAGNOSIS — E55.9 VITAMIN D DEFICIENCY: ICD-10-CM

## 2025-03-19 PROCEDURE — 99213 OFFICE O/P EST LOW 20 MIN: CPT | Mod: PBBFAC | Performed by: STUDENT IN AN ORGANIZED HEALTH CARE EDUCATION/TRAINING PROGRAM

## 2025-03-19 RX ORDER — METFORMIN HYDROCHLORIDE 500 MG/1
1500 TABLET, EXTENDED RELEASE ORAL NIGHTLY
Qty: 270 TABLET | Refills: 1 | Status: SHIPPED | OUTPATIENT
Start: 2025-03-19

## 2025-03-19 RX ORDER — LEVOTHYROXINE SODIUM 175 UG/1
175 TABLET ORAL
Qty: 30 TABLET | Refills: 3 | Status: SHIPPED | OUTPATIENT
Start: 2025-03-19

## 2025-03-19 RX ORDER — CHOLECALCIFEROL (VITAMIN D3) 50 MCG
2000 TABLET ORAL DAILY
Qty: 30 TABLET | Refills: 3 | Status: SHIPPED | OUTPATIENT
Start: 2025-03-19

## 2025-03-19 NOTE — PROGRESS NOTES
[facility] Endocrinology Clinic Note    CC: follow up diabetes, thyroid cancer, hypothyroidism    HISTORY:  Kinza Ford is a 39 y.o. female with h/o DMII w/ differentiated thyroid cancer s/p surgery with resulting postsurgical hypothyroidism and indeterminate surveillance  who presents for follow up. Last seen in endocrine clinic in 8/2024. At that time, Jardiance was added.     Today, patient is without acute issues. Current DM regimen of Lantus 40 U nighty, Jardiance 25 mg daily, metformin 1000 mg daily, and Tradjenta 5 mg daily. Pt reports compliance with all these medications. She notices how her glucoses change with her diet and with exercise. She is interested in increasing the metfmorin to 3 pills daily. Pt has tried Ozempic and dulaglutide in the past with severe N/V. Pt with CGM with review of readings below.    She is taking the synthroid 200 mcg daily first thing in the morning on an empty stomach.     PMHx:   Problem List[1]  Allergies:   Review of patient's allergies indicates:   Allergen Reactions    Adhesive      Other reaction(s): Burn    Sulfa (sulfonamide antibiotics)      Other reaction(s): Unknown    Sulfamethoxazole      Other reaction(s): Rash    Ozempic [semaglutide] Nausea And Vomiting    Trulicity [dulaglutide] Nausea And Vomiting     MEDICATIONS:  Current Outpatient Medications   Medication Instructions    albuterol (PROVENTIL/VENTOLIN HFA) 90 mcg/actuation inhaler 1 puff, Inhalation, Every 6 hours PRN, As needed for wheezing.    atorvastatin (LIPITOR) 10 mg, Oral, Daily    azelastine (ASTELIN) 137 mcg, Nasal, 2 times daily    blood sugar diagnostic Strp To check BG 3 times daily, to use with insurance preferred meter    blood-glucose meter kit To check BG 3 times daily, to use with insurance preferred meter    blood-glucose meter,continuous (DEXCOM G7 ) Misc 1 each, Misc.(Non-Drug; Combo Route), Daily    blood-glucose sensor (DEXCOM G7 SENSOR) Zina 1 each, Misc.(Non-Drug;  "Combo Route), Every 10 days    empagliflozin (JARDIANCE) 10 mg, Oral, Daily    EPINEPHrine (EPIPEN) 0.3 mg/0.3 mL AtIn SMARTSI Pre-Filled Pen Syringe IM Once    ergocalciferol (ERGOCALCIFEROL) 50,000 Units, Oral, Every 7 days    fluticasone propionate (FLONASE) 50 mcg, Each Nostril, Daily    imipramine (TOFRANIL) 25 mg, Oral, Nightly    lancets (ACCU-CHEK SOFTCLIX LANCETS) Misc 1 each, Misc.(Non-Drug; Combo Route), 3 times daily    LANTUS SOLOSTAR U-100 INSULIN 40 Units, Subcutaneous, Nightly    levothyroxine (SYNTHROID) 200 mcg, Oral, Before breakfast    loratadine (CLARITIN) 10 mg, Oral, Daily    metFORMIN (GLUCOPHAGE-XR) 1,000 mg, Oral, Nightly    pen needle, diabetic 32 gauge x 5/32" Ndle 1 each, Misc.(Non-Drug; Combo Route), Nightly    TRADJENTA 5 mg, Oral, Daily      PHYSICAL EXAM:    Vital Signs:  /81   Pulse 93   Temp 97.6 °F (36.4 °C)   Resp 17   Ht 5' 4" (1.626 m)   Wt 86.7 kg (191 lb 2.2 oz)   BMI 32.81 kg/m²     Constitutional: Appears stated age, resting comfortably, in no acute distress, obese  HEENT: Nomocephalic, Atraumatic, conjunctiva normal, No scleral icterus, EOMI, no exophthalmos  CV: RRR, no murmurs   Resp: CTAB. No wheezes or crackles. Not in respiratory distress. On RA  GI: Soft, non-distended, non-tender   : No bernal in place   Skin: Warm, dry, intact   Extremities: No LE edema  Neurologic: Alert and oriented x3.    LABS:  Lab Results   Component Value Date    HGBA1C 5.6 2025    HGBA1C 5.4 2025    HGBA1C 8.8 (H) 2024     Lab Results   Component Value Date    CREATININE 0.78 2024     Lab Results   Component Value Date    TSH <0.008 (L) 2025  Microalbumin/creatinine 6.4   Thyroglobulin 0.5  A1c 5.4%  Lipid panel with LDL 99    2025  TSH undetectable a low  T4 1.78 (high)  Vitamin-D 44  A1c 5.6%    Thyroid US 2025  FINDINGS:  Prior thyroidectomy.  There is echogenic tissue in the left thyroid bed measuring 1.4 cm x 0.7 cm " x 0.8 cm.  There is no lymphadenopathy.  There are no abnormal masses.     Impression:  Echogenic tissue in the left thyroidectomy bed of uncertain clinical significance.  This could represent residual thyroid tissue or possibly fat necrosis.    CGM DATA:  Name:Kinza Ford    :1985    MRN:[id number]     Indication: T2DM    Equipment Type: Dexcom   Start Date: 3/6/2025    End Date: 2025     Printout Date: 2025        Analysis: There are more than 72 hours of data available for interpretation. Sensor usage:  days. Her average glucose is 144 with GMI of 6.8%. She is 90% in range and 0% low and <1% very low. She is high 10% of the time and 0% very high. She had 1 hypoglycemic event at 5AM in the 60-70's.       Interpretation: Glucoses at goal. Continue current regimen.    ASSESSMENT & PLAN:   Papillary thyroid carcinoma  T1 a N0 M0  involving the left lobe of the thyroid gland s/p total thyroidectomy (2021) - intermediate risk of recurrence  Hypothyroidism s/p Thyroidectomy  Thyroid US 2025 which notes represent residual thyroid tissue or possibly fat necrosis. TSH below goal  -Decrease Synthroid to 175 mcg daily. Goal TSH 0.1-2.5  -Repeat TSH in 4 weeks  -Repeat labs before next visit in 3 mo: TSH, thyroglobulin     Type II Diabetes Mellitus  Controlled on current regimen. Pt has not tolerated Ozempic or dulaglutide in the past. Will increase metformin to wean down insulin. Pt notes that she would prefer to remain on insulin in the future to maintain her CGM.  -Continue Lantus 40 U nightly  -Continue Jardiance 25 mg daily  -Increase metformin to 1500 mg daily  -CGM in place  -Rest of DM maintenance at next visit. Discuss eye and foot exam at next visit     Vitamin-D deficiency  Testing for Celiac was negative in 2025. Last Vit D level is at goal  -DC high dose Vit D  -Start Vit D 2000 U daily    Follow up in about 3 months (around 2025).    Future Appointments   Date  Time Provider Department Center   4/8/2025 10:20 AM Fely Washington, FNP [facility]   6/18/2025  9:30 AM Kiara Hernadez MD [facility] ENDOCR [place]    8/4/2025 11:30 AM Jake Horvath NP [facility] UROLO [place]        Kiara Hernadez MD  Internal Medicine         [1]   Patient Active Problem List  Diagnosis    Adjustment disorder with mixed anxiety and depressed mood    History of thyroidectomy    Hyperlipidemia    Mild intermittent asthma    Thyroid cancer    Postsurgical hypothyroidism    Type 2 diabetes mellitus without complication    Steatosis of liver    Vitamin D deficiency    Stress incontinence of urine    Kidney stone    Acne    Chronic left-sided low back pain without sciatica

## 2025-04-11 ENCOUNTER — OFFICE VISIT (OUTPATIENT)
Dept: FAMILY MEDICINE | Facility: CLINIC | Age: 40
End: 2025-04-11
Payer: MEDICAID

## 2025-04-11 VITALS
OXYGEN SATURATION: 100 % | DIASTOLIC BLOOD PRESSURE: 80 MMHG | SYSTOLIC BLOOD PRESSURE: 115 MMHG | HEIGHT: 64 IN | WEIGHT: 193 LBS | HEART RATE: 82 BPM | RESPIRATION RATE: 18 BRPM | BODY MASS INDEX: 32.95 KG/M2 | TEMPERATURE: 98 F

## 2025-04-11 DIAGNOSIS — G89.29 CHRONIC LEFT-SIDED LOW BACK PAIN WITHOUT SCIATICA: Primary | ICD-10-CM

## 2025-04-11 DIAGNOSIS — M54.50 CHRONIC LEFT-SIDED LOW BACK PAIN WITHOUT SCIATICA: Primary | ICD-10-CM

## 2025-04-11 DIAGNOSIS — L70.9 ACNE, UNSPECIFIED ACNE TYPE: ICD-10-CM

## 2025-04-11 PROCEDURE — 99215 OFFICE O/P EST HI 40 MIN: CPT | Mod: PBBFAC,PN | Performed by: NURSE PRACTITIONER

## 2025-04-11 RX ORDER — PIOGLITAZONEHYDROCHLORIDE 30 MG/1
30 TABLET ORAL DAILY
COMMUNITY
Start: 2025-02-27

## 2025-04-11 NOTE — PROGRESS NOTES
Patient Name: Kinza Ford     : 1985    MRN: [id number]     Subjective:     Patient ID: Kinza Ford is a 39 y.o. female.    Chief Complaint:   Chief Complaint   Patient presents with    Follow-up     Pt is here for follow up. Pt has no complaints today        HPI: 25:  2 month FU acne and low back pain.  Is feeling good. Problems have resolved. She changed her skin regimen.  She states back pain resolved.     25: Acne. Describes as cystic, is under good control today. Pt is  and has been doing peel on her face and missed gym all week due to that so her lower back has been hurting.  She is wanting her hormones tested to see if the acne is internally related to her hormone levels. She did have partial hysterectomy but still has ovaries.      10/11/24: Bladder incontinence.  New complaint today.  She only has this issue when she is active.  If she is doing dancing or running on a treadmill she is almost having to wear a diaper because she leaks out so much urine. She has had 5 deliveries.  She has been doing kegel exercises every time she goes to the bathroom.  She has hx thyroid cancer in past.  States it just started happening all of a sudden.  She has been making sure that she empties out everything each time she urinates by bending forward, etc. Denies urgency, frequency, nocturia.        7/3/24:  6 month FU. Followed by Endo for her thyroids and DM.     Type 2 diabetes-  She is now on Lantus.  She is due for microalbumin and foot exam today.    Pt would like Dexcom. Has met with diabetic education but its been a while.    States she eats healthy and is back in the gym.  She can't eat carbs without her glucose increasing high.      Thyroid disease/Thyroidectomy-  Pt sees Endocrine for this issue. Last TSH was <0.008.  She is due for repeat labs.  Hx papillary thyroid cancer.    HLD-  Pt is now on Lipitor. She is not really sure if she is taking it but it is on  her medication list.     Seasonal allergies-  Asking for allergy meds. Increased sneezing and itchy eyes.       Health Maintenance-  MMG scheduled for 2024.   PAP-hysterectomy  due to fibroids              ROS:      Review of Systems   Constitutional: Negative.    HENT: Negative.     Eyes: Negative.    Respiratory: Negative.     Cardiovascular: Negative.    Gastrointestinal: Negative.    Genitourinary: Negative.    Musculoskeletal: Negative.    Skin: Negative.    Neurological: Negative.    Endo/Heme/Allergies: Negative.    Psychiatric/Behavioral: Negative.     All other systems reviewed and are negative.      12 point review of systems conducted, negative except as stated in the history of present illness. See HPI for details.    History:     Past Medical History:   Diagnosis Date    Acquired hypothyroidism     Allergy     DM2 (diabetes mellitus, type 2)     Hx of papillary thyroid carcinoma     IBS (irritable bowel syndrome)     Migraines     Mixed hyperlipidemia     Statin intolerance 2023    Uncontrolled type 2 diabetes mellitus with hyperglycemia 2023        Past Surgical History:   Procedure Laterality Date    ADENOIDECTOMY  2014     SECTION      COLONOSCOPY      ESOPHAGOGASTRODUODENOSCOPY      HYSTERECTOMY  2021    HYSTERECTOMY, TOTAL, VAGINAL, WITH CYSTOSCOPY  2021    MODIFIED [employer] CULDOPLASTY    THYROIDECTOMY  2021    TOTAL 2/2 PAPILLARY THRYROID CARCINOMA    TONSILLECTOMY  2008    ADENOIDECTOMY    TRIGGER FINGER RELEASE Right 2023    Procedure: RELEASE, TRIGGER FINGER, RING;  Surgeon: Hang Casas MD;  Location: [facility];  Service: Plastics;  Laterality: Right;    TUBAL LIGATION  2018    URETERAL STENT PLACEMENT Right 2008    WISDOM TOOTH EXTRACTION         Family History   Problem Relation Name Age of Onset    No Known Problems Mother      Diabetes Father Tin     Crohn's disease Father Tin     Diabetes Mellitus Father Tin      "Diabetes Maternal Aunt Suni     Cancer Paternal Uncle Nash         Social History     Tobacco Use    Smoking status: Never     Passive exposure: Never    Smokeless tobacco: Never   Substance and Sexual Activity    Alcohol use: Yes     Comment: once a month    Drug use: Never    Sexual activity: Yes     Partners: Male     Birth control/protection: See Surgical Hx     Comment: Full hysterectomy       Current Outpatient Medications   Medication Instructions    albuterol (PROVENTIL/VENTOLIN HFA) 90 mcg/actuation inhaler 1 puff, Inhalation, Every 6 hours PRN, As needed for wheezing.    atorvastatin (LIPITOR) 10 mg, Oral, Daily    azelastine (ASTELIN) 137 mcg, Nasal, 2 times daily    blood sugar diagnostic Strp To check BG 3 times daily, to use with insurance preferred meter    blood-glucose meter kit To check BG 3 times daily, to use with insurance preferred meter    blood-glucose meter,continuous (DEXCOM G7 ) Misc 1 each, Misc.(Non-Drug; Combo Route), Daily    blood-glucose sensor (DEXCOM G7 SENSOR) Zina 1 each, Misc.(Non-Drug; Combo Route), Every 10 days    cholecalciferol (vitamin D3) (VITAMIN D3) 2,000 Units, Oral, Daily    empagliflozin (JARDIANCE) 10 mg, Oral, Daily    EPINEPHrine (EPIPEN) 0.3 mg/0.3 mL AtIn SMARTSI Pre-Filled Pen Syringe IM Once    fluticasone propionate (FLONASE) 50 mcg, Each Nostril, Daily    imipramine (TOFRANIL) 25 mg, Oral, Nightly    lancets (ACCU-CHEK SOFTCLIX LANCETS) Misc 1 each, Misc.(Non-Drug; Combo Route), 3 times daily    LANTUS SOLOSTAR U-100 INSULIN 40 Units, Subcutaneous, Nightly    levothyroxine (SYNTHROID, LEVOTHROID) 175 mcg, Oral, Before breakfast    loratadine (CLARITIN) 10 mg, Oral, Daily    metFORMIN (GLUCOPHAGE-XR) 1,500 mg, Oral, Nightly    pen needle, diabetic 32 gauge x " Ndle 1 each, Misc.(Non-Drug; Combo Route), Nightly    pioglitazone (ACTOS) 30 mg, Daily    TRADJENTA 5 mg, Oral, Daily        Review of patient's allergies indicates:   Allergen " "Reactions    Adhesive      Other reaction(s): Burn    Sulfa (sulfonamide antibiotics)      Other reaction(s): Unknown    Sulfamethoxazole      Other reaction(s): Rash    Ozempic [semaglutide] Nausea And Vomiting    Trulicity [dulaglutide] Nausea And Vomiting       Patient Care Team:  Fely Washington FNP as PCP - General (Nurse Practitioner)  Kellen Haynes RD, [facility] [place] as Diabetes Educator (Diabetes)  Bladimir Goodman MD as Consulting Physician (Endocrinology)    Objective:     Visit Vitals  /80 (BP Location: Left arm, Patient Position: Sitting)   Pulse 82   Temp 97.7 °F (36.5 °C) (Oral)   Resp 18   Ht 5' 4" (1.626 m)   Wt 87.5 kg (193 lb)   SpO2 100%   BMI 33.13 kg/m²       Physical Examination:     Physical Exam  Vitals reviewed.   Constitutional:       Appearance: Normal appearance. She is normal weight.   HENT:      Head: Normocephalic.   Cardiovascular:      Rate and Rhythm: Normal rate and regular rhythm.      Pulses: Normal pulses.      Heart sounds: Normal heart sounds.   Pulmonary:      Effort: Pulmonary effort is normal.      Breath sounds: Normal breath sounds.   Abdominal:      General: Abdomen is flat.      Palpations: Abdomen is soft.   Musculoskeletal:         General: Normal range of motion.      Cervical back: Normal range of motion.   Skin:     General: Skin is warm and dry.   Neurological:      Mental Status: She is alert.   Psychiatric:         Mood and Affect: Mood normal.         Lab Results:     Chemistry:  Lab Results   Component Value Date     07/03/2024    K 4.2 07/03/2024    BUN 10.6 07/03/2024    CREATININE 0.78 07/03/2024    EGFRNORACEVR >60 07/03/2024    GLUCOSE 262 (H) 07/03/2024    CALCIUM 9.7 07/03/2024    ALKPHOS 80 07/03/2024    LABPROT 8.0 07/03/2024    ALBUMIN 4.1 07/03/2024    BILIDIR 0.1 03/21/2022    IBILI 0.30 03/21/2022    AST 20 07/03/2024    ALT 42 07/03/2024    PHOS 3.2 05/24/2023    [id number] 44 03/18/2025    TSH <0.008 (L) 03/18/2025    [id number] 1.78 " (H) 03/18/2025        Lab Results   Component Value Date    HGBA1C 5.6 03/18/2025        Hematology:  Lab Results   Component Value Date    WBC 8.82 07/03/2024    HGB 14.5 07/03/2024    HCT 40.8 07/03/2024     07/03/2024       Lipid Panel:  Lab Results   Component Value Date    CHOL 175 01/28/2025    HDL 55 01/28/2025    LDL 99.00 01/28/2025    TRIG 107 01/28/2025    TOTALCHOLEST 3 01/28/2025        Urine:  Lab Results   Component Value Date    APPEARANCEUA Clear 10/11/2024    SGUA 1.032 (H) 10/11/2024    PROTEINUA Negative 10/11/2024    KETONESUA Negative 10/11/2024    LEUKOCYTESUR Negative 10/11/2024    RBCUA 0-5 10/11/2024    WBCUA 0-5 10/11/2024    BACTERIA None Seen 10/11/2024    SQEPUA Trace (A) 10/11/2024    HYALINECASTS None Seen 10/11/2024    CREATRANDUR 132.2 (H) 01/28/2025        Assessment:          ICD-10-CM ICD-9-CM   1. Chronic left-sided low back pain without sciatica  M54.50 724.2    G89.29 338.29   2. Acne, unspecified acne type  L70.9 706.1          Plan:     1. Chronic left-sided low back pain without sciatica  Assessment & Plan:  Resolved.      2. Acne, unspecified acne type  Assessment & Plan:  Hormonal testing negative. Situation improved in skin when changed routine.             Follow up in about 6 months (around 10/11/2025) for type 2 dm, thyorid disease, HLD, seasonal allergies..  In addition to their scheduled follow up, the patient has also been instructed to follow up on as needed basis.       Future Appointments   Date Time Provider Department Center   6/18/2025  9:30 AM Kiara Hernadez MD [facility] ENDOCR [place]    8/4/2025 11:30 AM Jake Horvath NP [facility] UROLO [place]    10/15/2025  8:20 AM Fely Washington FNP [facility]        CARSON Tsang

## 2025-05-06 DIAGNOSIS — E11.9 TYPE 2 DIABETES MELLITUS WITHOUT COMPLICATION, WITHOUT LONG-TERM CURRENT USE OF INSULIN: ICD-10-CM

## 2025-05-06 DIAGNOSIS — E55.9 VITAMIN D DEFICIENCY: ICD-10-CM

## 2025-05-06 DIAGNOSIS — E89.0 HYPOTHYROIDISM, POSTSURGICAL: ICD-10-CM

## 2025-05-06 DIAGNOSIS — C73 THYROID CANCER: ICD-10-CM

## 2025-05-06 RX ORDER — LINAGLIPTIN 5 MG/1
5 TABLET, FILM COATED ORAL DAILY
Qty: 90 TABLET | Refills: 0 | Status: SHIPPED | OUTPATIENT
Start: 2025-05-06

## 2025-05-06 NOTE — TELEPHONE ENCOUNTER
----- Message from Tracey sent at 5/6/2025  9:54 AM CDT -----  Pt of Pt called stating that she's not able to refill her medication. Pt is unsure of the name of the medication.Pt requesting a call back at [phone] advise

## 2025-05-06 NOTE — TELEPHONE ENCOUNTER
Patient states she was told by pharmacy that the tradjenta was discontinued.  I do not see where it was, only change I see in last note was the increase in metformin.  She is requesting refill, please advise.

## 2025-06-05 DIAGNOSIS — E11.9 TYPE 2 DIABETES MELLITUS WITHOUT COMPLICATION, WITH LONG-TERM CURRENT USE OF INSULIN: ICD-10-CM

## 2025-06-05 DIAGNOSIS — E11.9 TYPE 2 DIABETES MELLITUS WITHOUT COMPLICATION, WITHOUT LONG-TERM CURRENT USE OF INSULIN: ICD-10-CM

## 2025-06-05 DIAGNOSIS — Z79.4 TYPE 2 DIABETES MELLITUS WITHOUT COMPLICATION, WITH LONG-TERM CURRENT USE OF INSULIN: ICD-10-CM

## 2025-06-05 DIAGNOSIS — E89.0 HYPOTHYROIDISM, POSTSURGICAL: ICD-10-CM

## 2025-06-05 DIAGNOSIS — E55.9 VITAMIN D DEFICIENCY: ICD-10-CM

## 2025-06-05 DIAGNOSIS — C73 THYROID CANCER: ICD-10-CM

## 2025-06-05 RX ORDER — BLOOD-GLUCOSE SENSOR
1 EACH MISCELLANEOUS
Qty: 3 EACH | Refills: 1 | Status: SHIPPED | OUTPATIENT
Start: 2025-06-05 | End: 2026-06-05

## 2025-06-05 RX ORDER — ATORVASTATIN CALCIUM 10 MG/1
10 TABLET, FILM COATED ORAL DAILY
Qty: 30 TABLET | Refills: 11 | Status: SHIPPED | OUTPATIENT
Start: 2025-06-05

## 2025-06-05 RX ORDER — PEN NEEDLE, DIABETIC 30 GX3/16"
1 NEEDLE, DISPOSABLE MISCELLANEOUS NIGHTLY
Qty: 100 EACH | Refills: 11 | Status: SHIPPED | OUTPATIENT
Start: 2025-06-05

## 2025-06-05 RX ORDER — LINAGLIPTIN 5 MG/1
5 TABLET, FILM COATED ORAL DAILY
Qty: 90 TABLET | Refills: 0 | OUTPATIENT
Start: 2025-06-05

## 2025-06-05 RX ORDER — INSULIN GLARGINE 100 [IU]/ML
40 INJECTION, SOLUTION SUBCUTANEOUS NIGHTLY
Qty: 45 ML | Refills: 1 | Status: SHIPPED | OUTPATIENT
Start: 2025-06-05

## 2025-06-05 RX ORDER — METFORMIN HYDROCHLORIDE 500 MG/1
1500 TABLET, EXTENDED RELEASE ORAL NIGHTLY
Qty: 270 TABLET | Refills: 1 | OUTPATIENT
Start: 2025-06-05

## 2025-06-05 RX ORDER — LEVOTHYROXINE SODIUM 175 UG/1
175 TABLET ORAL
Qty: 30 TABLET | Refills: 1 | Status: SHIPPED | OUTPATIENT
Start: 2025-06-05

## 2025-06-05 RX ORDER — LANCETS
1 EACH MISCELLANEOUS 3 TIMES DAILY
Qty: 200 EACH | Refills: 11 | Status: SHIPPED | OUTPATIENT
Start: 2025-06-05

## 2025-07-22 DIAGNOSIS — J30.2 SEASONAL ALLERGIES: ICD-10-CM

## 2025-07-22 RX ORDER — FLUTICASONE PROPIONATE 50 MCG
SPRAY, SUSPENSION (ML) NASAL
Qty: 16 G | Refills: 6 | Status: SHIPPED | OUTPATIENT
Start: 2025-07-22

## 2025-07-22 RX ORDER — LORATADINE 10 MG/1
10 TABLET ORAL DAILY
Qty: 30 TABLET | Refills: 6 | Status: SHIPPED | OUTPATIENT
Start: 2025-07-22 | End: 2026-07-22

## 2025-07-25 ENCOUNTER — OFFICE VISIT (OUTPATIENT)
Dept: FAMILY MEDICINE | Facility: CLINIC | Age: 40
End: 2025-07-25
Payer: MEDICAID

## 2025-07-25 VITALS
RESPIRATION RATE: 18 BRPM | HEIGHT: 64 IN | WEIGHT: 196 LBS | TEMPERATURE: 99 F | BODY MASS INDEX: 33.46 KG/M2 | OXYGEN SATURATION: 98 % | SYSTOLIC BLOOD PRESSURE: 102 MMHG | DIASTOLIC BLOOD PRESSURE: 70 MMHG | HEART RATE: 85 BPM

## 2025-07-25 DIAGNOSIS — J32.9 BACTERIAL SINUSITIS: ICD-10-CM

## 2025-07-25 DIAGNOSIS — R05.2 SUBACUTE COUGH: Primary | ICD-10-CM

## 2025-07-25 DIAGNOSIS — B96.89 BACTERIAL SINUSITIS: ICD-10-CM

## 2025-07-25 LAB
FLUAV AG UPPER RESP QL IA.RAPID: NOT DETECTED
FLUBV AG UPPER RESP QL IA.RAPID: NOT DETECTED
SARS-COV-2 RNA RESP QL NAA+PROBE: NOT DETECTED

## 2025-07-25 PROCEDURE — 99214 OFFICE O/P EST MOD 30 MIN: CPT | Mod: PBBFAC,PN | Performed by: NURSE PRACTITIONER

## 2025-07-25 PROCEDURE — 87636 SARSCOV2 & INF A&B AMP PRB: CPT | Performed by: NURSE PRACTITIONER

## 2025-07-25 RX ORDER — BETAMETHASONE SODIUM PHOSPHATE AND BETAMETHASONE ACETATE 3; 3 MG/ML; MG/ML
6 INJECTION, SUSPENSION INTRA-ARTICULAR; INTRALESIONAL; INTRAMUSCULAR; SOFT TISSUE
Status: COMPLETED | OUTPATIENT
Start: 2025-07-25 | End: 2025-07-25

## 2025-07-25 RX ORDER — AMOXICILLIN AND CLAVULANATE POTASSIUM 875; 125 MG/1; MG/1
1 TABLET, FILM COATED ORAL 2 TIMES DAILY
Qty: 20 TABLET | Refills: 0 | Status: SHIPPED | OUTPATIENT
Start: 2025-07-25 | End: 2025-08-04

## 2025-07-25 RX ORDER — PROMETHAZINE HYDROCHLORIDE AND DEXTROMETHORPHAN HYDROBROMIDE 6.25; 15 MG/5ML; MG/5ML
5 SYRUP ORAL EVERY 4 HOURS PRN
Qty: 118 ML | Refills: 0 | Status: SHIPPED | OUTPATIENT
Start: 2025-07-25 | End: 2025-08-04

## 2025-07-25 RX ADMIN — BETAMETHASONE SODIUM PHOSPHATE AND BETAMETHASONE ACETATE 6 MG: 3; 3 INJECTION, SUSPENSION INTRA-ARTICULAR; INTRALESIONAL; INTRAMUSCULAR; SOFT TISSUE at 11:07

## 2025-07-25 NOTE — PATIENT INSTRUCTIONS
Dameon Echols,     If you are due for any health screening(s) below please notify me so we can arrange them to be ordered and scheduled. Most healthy patients at your age complete them, but you are free to accept or refuse.     If you can't do it, I'll definitely understand. If you can, I'd certainly appreciate it!    Tests to Keep You Healthy    Eye Exam: ORDERED BUT NOT SCHEDULED  Last HbA1c < 8 (03/18/2025): Yes      Your diabetic retinal eye exam is due     Diabetes is the #1 cause of blindness in the [place] - early detection before signs or symptoms develop can prevent debilitating blindness.     Our records indicate that you may be overdue for your annual diabetic eye exam. Eye screening can help identify patients at risk for developing vision loss which is common in diabetes. This simple screening is an important step to keeping you healthy and preventing complications from diabetes.     This recommended diabetic eye exam should take place once per year and can prevent and treat diabetes complications in the eye before developing symptoms. This can be done with a special camera is used to take photographs of the back of your eye without having to dilate them, or you can see an eye doctor for a full dilated exam.     If you recently had your yearly diabetic eye exam performed outside of [facility], please let your Health care team know so that they can update your health record.

## 2025-07-25 NOTE — ASSESSMENT & PLAN NOTE
Celestone soluspan 1cc IM now  Augmentin 875 mg po BID x 10 days  Promethazine DM syrup to pharmacy

## 2025-07-25 NOTE — PROGRESS NOTES
[facility]      Patient Name: Kinza Ford     : 1985    MRN: [id number]     Subjective:     Patient ID: Kinza Ford is a 39 y.o. female.    Chief Complaint:   Chief Complaint   Patient presents with    Follow-up     Pt is here for follow up. Pt c/o cold since         HPI: 25:  Cough, congestion with yellow sputum produced.  Sick since .  States her whole household was sick recently.  Has been using OTC cough syrup.  States was initially better then started feeling sick again and can't shake it.  Denies sore throat.  Denies fever. Main complaint is sinus congestion. Cough keeps her awake.  Was using flonase, claritin.  Is requesting a steroid shot to be given. States her glucose is under good control.            ROS:      Review of Systems   Constitutional: Negative.    HENT:  Positive for congestion. Negative for sore throat.    Eyes: Negative.    Respiratory:  Positive for cough and sputum production.    Cardiovascular: Negative.    Gastrointestinal: Negative.    Genitourinary: Negative.    Musculoskeletal: Negative.    Skin: Negative.    Neurological: Negative.    Endo/Heme/Allergies: Negative.    Psychiatric/Behavioral: Negative.     All other systems reviewed and are negative.      12 point review of systems conducted, negative except as stated in the history of present illness. See HPI for details.    History:     Health Maintenance         Date Due Completion Date    Foot Exam 2024    Diabetic Eye Exam 2024    COVID-19 Vaccine (2 - Aiyana risk series) 2026 (Originally 2022) 2022    Pneumococcal Vaccines (Age 0-49) (1 of 2 - PCV) 2026 (Originally 10/21/2004) ---    Influenza Vaccine (1) 2025 10/11/2024    Override on 2023: Declined    Hemoglobin A1c 2025 3/18/2025    Diabetes Urine Screening 2026    Lipid Panel 2026    TETANUS  VACCINE 2031    RSV Vaccine (Age 60+ and Pregnant patients) (1 - 1-dose 75+ series) 10/21/2060 ---            Past Medical History:   Diagnosis Date    Acquired hypothyroidism     Allergy     DM2 (diabetes mellitus, type 2)     Hx of papillary thyroid carcinoma     IBS (irritable bowel syndrome)     Migraines     Mixed hyperlipidemia     Statin intolerance 2023    Uncontrolled type 2 diabetes mellitus with hyperglycemia 2023        Past Surgical History:   Procedure Laterality Date    ADENOIDECTOMY  2014     SECTION      COLONOSCOPY      ESOPHAGOGASTRODUODENOSCOPY      HYSTERECTOMY  2021    HYSTERECTOMY, TOTAL, VAGINAL, WITH CYSTOSCOPY  2021    MODIFIED [employer] CULDOPLASTY    THYROIDECTOMY  2021    TOTAL 2/2 PAPILLARY THRYROID CARCINOMA    TONSILLECTOMY  2008    ADENOIDECTOMY    TRIGGER FINGER RELEASE Right 2023    Procedure: RELEASE, TRIGGER FINGER, RING;  Surgeon: Hang Casas MD;  Location: [facility];  Service: Plastics;  Laterality: Right;    TUBAL LIGATION  2018    URETERAL STENT PLACEMENT Right 2008    WISDOM TOOTH EXTRACTION         Family History   Problem Relation Name Age of Onset    No Known Problems Mother      Diabetes Father Tin     Crohn's disease Father Tin     Diabetes Mellitus Father Tin     Diabetes Maternal Aunt Suni     Cancer Paternal Uncle Nash         Social History     Tobacco Use    Smoking status: Never     Passive exposure: Never    Smokeless tobacco: Never   Substance and Sexual Activity    Alcohol use: Yes     Comment: once a month    Drug use: Never    Sexual activity: Yes     Partners: Male     Birth control/protection: See Surgical Hx     Comment: Full hysterectomy       Current Outpatient Medications   Medication Instructions    albuterol (PROVENTIL/VENTOLIN HFA) 90 mcg/actuation inhaler 1 puff, Inhalation, Every 6 hours PRN, As needed for wheezing.    amoxicillin-clavulanate 875-125mg (AUGMENTIN)  "875-125 mg per tablet 1 tablet, Oral, 2 times daily    atorvastatin (LIPITOR) 10 mg, Oral, Daily    azelastine (ASTELIN) 137 mcg, Nasal, 2 times daily    blood sugar diagnostic Strp To check BG 3 times daily, to use with insurance preferred meter    blood-glucose meter kit To check BG 3 times daily, to use with insurance preferred meter    blood-glucose meter,continuous (DEXCOM G7 ) Misc 1 each, Misc.(Non-Drug; Combo Route), Daily    blood-glucose sensor (DEXCOM G7 SENSOR) Zina 1 each, Misc.(Non-Drug; Combo Route), Every 10 days    cholecalciferol (vitamin D3) (VITAMIN D3) 2,000 Units, Oral, Daily    empagliflozin (JARDIANCE) 10 mg, Oral, Daily    EPINEPHrine (EPIPEN) 0.3 mg/0.3 mL AtIn SMARTSI Pre-Filled Pen Syringe IM Once    fluticasone propionate (FLONASE) 50 mcg/actuation nasal spray SHAKE LIQUID AND USE 1 SPRAY(50 MCG) IN EACH NOSTRIL DAILY    imipramine (TOFRANIL) 25 mg, Oral, Nightly    lancets (ACCU-CHEK SOFTCLIX LANCETS) Misc 1 each, Misc.(Non-Drug; Combo Route), 3 times daily    LANTUS SOLOSTAR U-100 INSULIN 40 Units, Subcutaneous, Nightly    levothyroxine (SYNTHROID, LEVOTHROID) 175 mcg, Oral, Before breakfast    loratadine (CLARITIN) 10 mg, Oral, Daily    metFORMIN (GLUCOPHAGE-XR) 1,500 mg, Oral, Nightly    pen needle, diabetic 32 gauge x 5/32" Ndle 1 each, Misc.(Non-Drug; Combo Route), Nightly    promethazine-dextromethorphan (PROMETHAZINE-DM) 6.25-15 mg/5 mL Syrp 5 mLs, Oral, Every 4 hours PRN    TRADJENTA 5 mg, Oral, Daily        Review of patient's allergies indicates:   Allergen Reactions    Adhesive      Other reaction(s): Burn    Sulfa (sulfonamide antibiotics)      Other reaction(s): Unknown    Sulfamethoxazole      Other reaction(s): Rash    Ozempic [semaglutide] Nausea And Vomiting    Trulicity [dulaglutide] Nausea And Vomiting       Patient Care Team:  Fely Washington FNP as PCP - General (Nurse Practitioner)  Kellen Haynes RD, [facility] as Diabetes Educator (Diabetes)  Rex, " "Bladimir SUNG MD as Consulting Physician (Endocrinology)    Objective:     Visit Vitals  /70   Pulse 85   Temp 99.1 °F (37.3 °C) (Oral)   Resp 18   Ht 5' 4" (1.626 m)   Wt 88.9 kg (196 lb)   SpO2 98%   BMI 33.64 kg/m²       Physical Examination:     Physical Exam  Vitals reviewed.   Constitutional:       Appearance: Normal appearance. She is normal weight.   HENT:      Head: Normocephalic.      Right Ear: Hearing, tympanic membrane and ear canal normal. Tenderness present.      Left Ear: Hearing, tympanic membrane and ear canal normal. Tenderness present.      Nose: Mucosal edema and congestion present.      Right Turbinates: Swollen.      Left Turbinates: Swollen.      Mouth/Throat:      Lips: Pink.      Mouth: Mucous membranes are moist.      Pharynx: Oropharynx is clear.   Cardiovascular:      Rate and Rhythm: Normal rate and regular rhythm.      Pulses: Normal pulses.      Heart sounds: Normal heart sounds.   Pulmonary:      Effort: Pulmonary effort is normal.      Breath sounds: Normal breath sounds.   Abdominal:      General: Abdomen is flat.      Palpations: Abdomen is soft.   Musculoskeletal:         General: Normal range of motion.      Cervical back: Normal range of motion.   Skin:     General: Skin is warm and dry.   Neurological:      Mental Status: She is alert.   Psychiatric:         Mood and Affect: Mood normal.         Lab Results:     Chemistry:  Lab Results   Component Value Date     07/03/2024    K 4.2 07/03/2024    BUN 10.6 07/03/2024    CREATININE 0.78 07/03/2024    EGFRNORACEVR >60 07/03/2024    CALCIUM 9.7 07/03/2024    ALKPHOS 80 07/03/2024    ALBUMIN 4.1 07/03/2024    BILIDIR 0.1 03/21/2022    IBILI 0.30 03/21/2022    AST 20 07/03/2024    ALT 42 07/03/2024    PHOS 3.2 05/24/2023    PYSOLGIV31AV 44 03/18/2025    TSH <0.008 (L) 03/18/2025    FVXRVA3BBWM 1.78 (H) 03/18/2025        Lab Results   Component Value Date    HGBA1C 5.6 03/18/2025        Hematology:  Lab Results   Component " Value Date    WBC 8.82 07/03/2024    HGB 14.5 07/03/2024    HCT 40.8 07/03/2024     07/03/2024       Lipid Panel:  Lab Results   Component Value Date    CHOL 175 01/28/2025    HDL 55 01/28/2025    LDL 99.00 01/28/2025    TRIG 107 01/28/2025    TOTALCHOLEST 3 01/28/2025        Urine:  Lab Results   Component Value Date    APPEARANCEUA Clear 10/11/2024    SGUA 1.032 (H) 10/11/2024    PROTEINUA Negative 10/11/2024    KETONESUA Negative 10/11/2024    LEUKOCYTESUR Negative 10/11/2024    RBCUA 0-5 10/11/2024    WBCUA 0-5 10/11/2024    BACTERIA None Seen 10/11/2024    SQEPUA Trace (A) 10/11/2024    HYALINECASTS None Seen 10/11/2024    CREATRANDUR 132.2 (H) 01/28/2025        Assessment:          ICD-10-CM ICD-9-CM   1. Subacute cough  R05.2 786.2   2. Bacterial sinusitis  J32.9 473.9    B96.89 041.9          Plan:     1. Subacute cough  -     COVID/FLU A&B PCR  -     betamethasone acetate-betamethasone sodium phosphate injection 6 mg  -     promethazine-dextromethorphan (PROMETHAZINE-DM) 6.25-15 mg/5 mL Syrp; Take 5 mLs by mouth every 4 (four) hours as needed.  Dispense: 118 mL; Refill: 0    2. Bacterial sinusitis  Assessment & Plan:  Celestone soluspan 1cc IM now  Augmentin 875 mg po BID x 10 days  Promethazine DM syrup to pharmacy    Orders:  -     amoxicillin-clavulanate 875-125mg (AUGMENTIN) 875-125 mg per tablet; Take 1 tablet by mouth 2 (two) times daily. for 10 days  Dispense: 20 tablet; Refill: 0           Follow up if symptoms worsen or fail to improve..  In addition to their scheduled follow up, the patient has also been instructed to follow up on as needed basis.       Future Appointments   Date Time Provider Department Center   8/4/2025 11:30 AM [facility]    8/28/2025  2:30 PM Kiara Hernadez MD [facility]   10/15/2025  8:20 AM Bernadette Quiñonez NP [facility]        CARSON Tsang

## 2025-07-26 DIAGNOSIS — Z79.4 TYPE 2 DIABETES MELLITUS WITHOUT COMPLICATION, WITH LONG-TERM CURRENT USE OF INSULIN: ICD-10-CM

## 2025-07-26 DIAGNOSIS — E11.9 TYPE 2 DIABETES MELLITUS WITHOUT COMPLICATION, WITH LONG-TERM CURRENT USE OF INSULIN: ICD-10-CM

## 2025-07-29 RX ORDER — EMPAGLIFLOZIN 10 MG/1
10 TABLET, FILM COATED ORAL
Qty: 30 TABLET | Refills: 11 | Status: SHIPPED | OUTPATIENT
Start: 2025-07-29

## 2025-08-06 ENCOUNTER — HOSPITAL ENCOUNTER (OUTPATIENT)
Dept: RADIOLOGY | Facility: HOSPITAL | Age: 40
Discharge: HOME | End: 2025-08-06
Payer: MEDICAID

## 2025-08-06 ENCOUNTER — OFFICE VISIT (OUTPATIENT)
Dept: URGENT CARE | Facility: CLINIC | Age: 40
End: 2025-08-06
Payer: MEDICAID

## 2025-08-06 VITALS
WEIGHT: 199.19 LBS | BODY MASS INDEX: 34.01 KG/M2 | RESPIRATION RATE: 18 BRPM | DIASTOLIC BLOOD PRESSURE: 73 MMHG | HEART RATE: 92 BPM | TEMPERATURE: 98 F | OXYGEN SATURATION: 100 % | HEIGHT: 64 IN | SYSTOLIC BLOOD PRESSURE: 112 MMHG

## 2025-08-06 DIAGNOSIS — H61.21 IMPACTED CERUMEN OF RIGHT EAR: ICD-10-CM

## 2025-08-06 DIAGNOSIS — S90.30XA CONTUSION OF DORSUM OF FOOT: Primary | ICD-10-CM

## 2025-08-06 DIAGNOSIS — S99.921A INJURY OF RIGHT FOOT, INITIAL ENCOUNTER: ICD-10-CM

## 2025-08-06 PROCEDURE — 99213 OFFICE O/P EST LOW 20 MIN: CPT | Mod: S$PBB,,,

## 2025-08-06 PROCEDURE — 73630 X-RAY EXAM OF FOOT: CPT | Mod: TC,RT

## 2025-08-06 PROCEDURE — 99215 OFFICE O/P EST HI 40 MIN: CPT | Mod: PBBFAC,25

## 2025-08-07 ENCOUNTER — OFFICE VISIT (OUTPATIENT)
Dept: URGENT CARE | Facility: CLINIC | Age: 40
End: 2025-08-07
Payer: MEDICAID

## 2025-08-07 VITALS
TEMPERATURE: 98 F | DIASTOLIC BLOOD PRESSURE: 77 MMHG | SYSTOLIC BLOOD PRESSURE: 113 MMHG | OXYGEN SATURATION: 97 % | WEIGHT: 199 LBS | HEART RATE: 102 BPM | HEIGHT: 64 IN | BODY MASS INDEX: 33.97 KG/M2 | RESPIRATION RATE: 18 BRPM

## 2025-08-07 DIAGNOSIS — R05.1 ACUTE COUGH: ICD-10-CM

## 2025-08-07 DIAGNOSIS — H61.21 IMPACTED CERUMEN OF RIGHT EAR: ICD-10-CM

## 2025-08-07 DIAGNOSIS — J06.9 VIRAL UPPER RESPIRATORY TRACT INFECTION: Primary | ICD-10-CM

## 2025-08-07 RX ORDER — BENZONATATE 100 MG/1
100 CAPSULE ORAL 3 TIMES DAILY PRN
Qty: 30 CAPSULE | Refills: 0 | Status: SHIPPED | OUTPATIENT
Start: 2025-08-07 | End: 2025-08-17

## 2025-08-07 NOTE — PATIENT INSTRUCTIONS
Continue using your azelastine nasal spray as directed  Nasal saline, Flonase nasal spray, Claritin OTC as directed  Take Tylenol or ibuprofen OTC for fever and pain as directed  take prescription Tessalon Perles for cough as directed   follow up with your primary care provider within 2-5 days if your signs and symptoms have not resolved or worsen.   If condition worsens or fails to improve we recommend that you receive another evaluation with your primary medical clinic to discuss your concerns.

## 2025-08-07 NOTE — PROCEDURES
"Ear Cerumen Removal    Date/Time: 8/7/2025 1:35 PM    Performed by: Eduardo Washington NP  Authorized by: Eduardo Washington NP    Time out: Immediately prior to procedure a "time out" was called to verify the correct patient, procedure, equipment, support staff and site/side marked as required.      Local anesthetic:  None  Location details:  Right ear  Procedure type: irrigation    Cerumen  Removal Results:  Cerumen completely removed  Patient tolerance:  Patient tolerated the procedure well with no immediate complications    "

## 2025-08-07 NOTE — PROGRESS NOTES
"Subjective:       Patient ID: Kinza Ford is a 39 y.o. female.    Vitals:  height is 5' 4" (1.626 m) and weight is 90.4 kg (199 lb 3.2 oz). Her oral temperature is 98.2 °F (36.8 °C). Her blood pressure is 112/73 and her pulse is 92. Her respiration is 18 and oxygen saturation is 100%.     Chief Complaint: Foot Injury (Pt co foot injury x 4 days )    40 y/o white female with hx of DM c/o right dorsal foot pain and bruising < 1 week, she reports injury while on water slide, states pain worsens with weight bearing.   Also c/o ongoing nasal congestion, recently completed Augmentin for presumed sinusitis.     Foot Injury         Constitution: Negative for chills and fever.   HENT:  Positive for ear pain, congestion and sinus pressure.    Musculoskeletal:  Positive for trauma, joint pain and joint swelling. Negative for abnormal ROM of joint.   Skin:  Positive for bruising.       Objective:      Physical Exam   Constitutional: She is oriented to person, place, and time. She appears well-developed. She is cooperative. She is easily aroused.  Non-toxic appearance. She does not appear ill. well-groomed and overweightawake  HENT:   Head: Normocephalic and atraumatic.   Ears:   Right Ear: impacted cerumen  Left Ear: Hearing, tympanic membrane, external ear and ear canal normal.   Nose: Mucosal edema and rhinorrhea present. Right sinus exhibits no maxillary sinus tenderness and no frontal sinus tenderness. Left sinus exhibits no maxillary sinus tenderness and no frontal sinus tenderness.   Mouth/Throat: Uvula is midline, oropharynx is clear and moist and mucous membranes are normal. Mucous membranes are moist. Tonsils are 0 on the right. Tonsils are 0 on the left. Oropharynx is clear.   Neck: Neck supple.   Cardiovascular: Normal rate.   Pulmonary/Chest: Effort normal.   Abdominal: Normal appearance.   Musculoskeletal:         General: Swelling and tenderness present.        Feet:       Comments: Area of swelling " and mild bruising, no increased temp, no wound, no obvious bony deformity.    Neurological: She is alert, oriented to person, place, and time and easily aroused. GCS eye subscore is 4. GCS verbal subscore is 5. GCS motor subscore is 6.   Skin: Skin is warm, dry, intact and not diaphoretic. Capillary refill takes less than 2 seconds.   Psychiatric: Her speech is normal and behavior is normal. Mood, judgment and thought content normal.   Nursing note and vitals reviewed.        Assessment:       1. Contusion of dorsum of foot    2. Injury of right foot, initial encounter    3. Impacted cerumen of right ear          Plan:     X-ray findings discussed, encouraged patient to participate in rice therapy, monitor for worsening symptoms, follow up with OLGA Geiger as needed.    Contusion of dorsum of foot    Injury of right foot, initial encounter  -     XR FOOT COMPLETE 3 VIEW RIGHT; Future; Expected date: 08/06/2025    Impacted cerumen of right ear  -     carbamide peroxide (DEBROX) 6.5 % otic solution; Place 5 drops into the right ear 2 (two) times daily. for 3 days  Dispense: 15 mL; Refill: 0           XR FOOT COMPLETE 3 VIEW RIGHT  Order: [id number]   Status: Final result       Next appt: 08/20/2025 at 11:00 AM in Urology (DELIA PETER NP)       Dx: Injury of right foot, initial encounter    Test Result Released: Yes (not seen)    0 Result Notes  Details    Reading Physician Reading Date Result Priority   Lionel Markham MD  [phone]  8/6/2025 STAT     Narrative & Impression  EXAMINATION:  XR FOOT COMPLETE 3 VIEW RIGHT     CLINICAL HISTORY:  foot contusion r/t injury;  Unspecified injury of right foot, initial encounter     TECHNIQUE:  Radiographs of the right foot with AP, lateral and oblique  views.     COMPARISON:  No prior imaging available for comparison     FINDINGS:  There is no acute fracture, subluxation or dislocation.     Joints and interspaces appear maintained.     Osseous structures show normal bone  mineral density.  Calcaneal enthesopathy is noted.     Soft tissues are unremarkable.     There are no radiopaque foreign bodies.     Impression:     No acute osseous abnormality, fracture, or dislocation.     There is no significant degenerative change.        Electronically signed by:Lionel Markham  Date:                                            08/06/2025  Time:                                           20:40        Exam Ended: 08/06/25 20:38 CDT Last Resulted: 08/06/25 20:40 CDT

## 2025-08-07 NOTE — PROGRESS NOTES
"Subjective:      Patient ID: Kinza Ford is a 39 y.o. female.    Vitals:  height is 5' 4" (1.626 m) and weight is 90.3 kg (199 lb). Her temperature is 98.2 °F (36.8 °C). Her blood pressure is 113/77 and her pulse is 102. Her respiration is 18 and oxygen saturation is 97%.     Chief Complaint: Cough     Patient is a 39 y.o. female who presents to urgent care with complaints of coughing and congestion with green mucus, ear pressure, and fatigue x2 days. Took antibiotics (amoxiclave) and finished them 2 days ago along with a steroid shot and is back to where she started.     Cough      HENT:  Positive for hearing loss (right).    Respiratory:  Positive for cough.       Objective:     Physical Exam   Constitutional: She is oriented to person, place, and time. She appears well-developed. She is cooperative.  Non-toxic appearance. She does not appear ill. No distress.   HENT:   Head: Normocephalic and atraumatic.   Ears:   Right Ear: Hearing and external ear normal. impacted cerumen  Left Ear: Hearing, tympanic membrane, external ear and ear canal normal.   Nose: Nose normal. No mucosal edema, rhinorrhea or nasal deformity. No epistaxis. Right sinus exhibits no maxillary sinus tenderness and no frontal sinus tenderness. Left sinus exhibits no maxillary sinus tenderness and no frontal sinus tenderness.   Mouth/Throat: Uvula is midline, oropharynx is clear and moist and mucous membranes are normal. No trismus in the jaw. Normal dentition. No uvula swelling. No oropharyngeal exudate, posterior oropharyngeal edema or posterior oropharyngeal erythema.   Eyes: Conjunctivae and lids are normal. No scleral icterus.   Neck: Trachea normal and phonation normal. Neck supple. No edema present. No erythema present. No neck rigidity present.   Cardiovascular: Normal rate, regular rhythm, normal heart sounds and normal pulses.   Pulmonary/Chest: Effort normal and breath sounds normal. No respiratory distress. She has no " decreased breath sounds. She has no rhonchi.   Abdominal: Normal appearance.   Musculoskeletal: Normal range of motion.         General: No deformity. Normal range of motion.   Neurological: She is alert and oriented to person, place, and time. She exhibits normal muscle tone. Coordination normal.   Skin: Skin is warm, dry, intact, not diaphoretic and not pale.   Psychiatric: Her speech is normal and behavior is normal. Judgment and thought content normal.   Nursing note and vitals reviewed.    Previous History:     Review of patient's allergies indicates:   Allergen Reactions    Adhesive      Other reaction(s): Burn    Sulfa (sulfonamide antibiotics)      Other reaction(s): Unknown    Sulfamethoxazole      Other reaction(s): Rash    Ozempic [semaglutide] Nausea And Vomiting    Trulicity [dulaglutide] Nausea And Vomiting       Past Medical History:   Diagnosis Date    Acquired hypothyroidism     Allergy     DM2 (diabetes mellitus, type 2)     Hx of papillary thyroid carcinoma     IBS (irritable bowel syndrome)     Migraines     Mixed hyperlipidemia     Statin intolerance 12/11/2023    Uncontrolled type 2 diabetes mellitus with hyperglycemia 01/19/2023     Current Outpatient Medications   Medication Instructions    albuterol (PROVENTIL/VENTOLIN HFA) 90 mcg/actuation inhaler 1 puff, Inhalation, Every 6 hours PRN, As needed for wheezing.    atorvastatin (LIPITOR) 10 mg, Oral, Daily    azelastine (ASTELIN) 137 mcg, Nasal, 2 times daily    benzonatate (TESSALON) 100 mg, Oral, 3 times daily PRN    blood sugar diagnostic Strp To check BG 3 times daily, to use with insurance preferred meter    blood-glucose meter kit To check BG 3 times daily, to use with insurance preferred meter    blood-glucose meter,continuous (DEXCOM G7 ) Misc 1 each, Misc.(Non-Drug; Combo Route), Daily    blood-glucose sensor (DEXCOM G7 SENSOR) Zina 1 each, Misc.(Non-Drug; Combo Route), Every 10 days    carbamide peroxide (DEBROX) 6.5 % otic  "solution 5 drops, Right Ear, 2 times daily    cholecalciferol (vitamin D3) (VITAMIN D3) 2,000 Units, Oral, Daily    EPINEPHrine (EPIPEN) 0.3 mg/0.3 mL AtIn SMARTSI Pre-Filled Pen Syringe IM Once    fluticasone propionate (FLONASE) 50 mcg/actuation nasal spray SHAKE LIQUID AND USE 1 SPRAY(50 MCG) IN EACH NOSTRIL DAILY    imipramine (TOFRANIL) 25 mg, Oral, Nightly    JARDIANCE 10 mg, Oral    lancets (ACCU-CHEK SOFTCLIX LANCETS) Misc 1 each, Misc.(Non-Drug; Combo Route), 3 times daily    LANTUS SOLOSTAR U-100 INSULIN 40 Units, Subcutaneous, Nightly    levothyroxine (SYNTHROID, LEVOTHROID) 175 mcg, Oral, Before breakfast    loratadine (CLARITIN) 10 mg, Oral, Daily    metFORMIN (GLUCOPHAGE-XR) 1,500 mg, Oral, Nightly    pen needle, diabetic 32 gauge x 5/32" Ndle 1 each, Misc.(Non-Drug; Combo Route), Nightly    TRADJENTA 5 mg, Oral, Daily     Past Surgical History:   Procedure Laterality Date    ADENOIDECTOMY       SECTION      COLONOSCOPY      ESOPHAGOGASTRODUODENOSCOPY      HYSTERECTOMY  2021    HYSTERECTOMY, TOTAL, VAGINAL, WITH CYSTOSCOPY  2021    MODIFIED [employer] CULDOPLASTY    THYROIDECTOMY  2021    TOTAL 2/2 PAPILLARY THRYROID CARCINOMA    TONSILLECTOMY  2008    ADENOIDECTOMY    TRIGGER FINGER RELEASE Right 2023    Procedure: RELEASE, TRIGGER FINGER, RING;  Surgeon: Hang Casas MD;  Location: [facility];  Service: Plastics;  Laterality: Right;    TUBAL LIGATION  2018    URETERAL STENT PLACEMENT Right 2008    WISDOM TOOTH EXTRACTION       Family History   Problem Relation Name Age of Onset    No Known Problems Mother      Diabetes Father Tin     Crohn's disease Father Tin     Diabetes Mellitus Father Tin     Diabetes Maternal Aunt Suni     Cancer Paternal Uncle Nash        Social History[1]  Assessment:     1. Viral upper respiratory tract infection    2. Impacted cerumen of right ear    3. Acute cough        Plan:   Continue using your azelastine nasal " spray as directed  Nasal saline, Flonase nasal spray, Claritin OTC as directed  Take Tylenol or ibuprofen OTC for fever and pain as directed  take prescription Tessalon Perles for cough as directed   follow up with your primary care provider within 2-5 days if your signs and symptoms have not resolved or worsen.   If condition worsens or fails to improve we recommend that you receive another evaluation with your primary medical clinic to discuss your concerns.      Viral upper respiratory tract infection    Impacted cerumen of right ear    Acute cough  -     benzonatate (TESSALON) 100 MG capsule; Take 1 capsule (100 mg total) by mouth 3 (three) times daily as needed for Cough.  Dispense: 30 capsule; Refill: 0                         [1]   Social History  Tobacco Use    Smoking status: Never     Passive exposure: Never    Smokeless tobacco: Never   Substance Use Topics    Alcohol use: Yes     Comment: occ    Drug use: Never

## 2025-08-19 DIAGNOSIS — N20.0 KIDNEY STONE: Primary | ICD-10-CM

## 2025-08-20 ENCOUNTER — HOSPITAL ENCOUNTER (OUTPATIENT)
Dept: RADIOLOGY | Facility: HOSPITAL | Age: 40
Discharge: HOME | End: 2025-08-20
Attending: NURSE PRACTITIONER
Payer: MEDICAID

## 2025-08-20 ENCOUNTER — OFFICE VISIT (OUTPATIENT)
Dept: UROLOGY | Facility: CLINIC | Age: 40
End: 2025-08-20
Payer: MEDICAID

## 2025-08-20 VITALS
HEART RATE: 97 BPM | SYSTOLIC BLOOD PRESSURE: 112 MMHG | HEIGHT: 64 IN | OXYGEN SATURATION: 97 % | RESPIRATION RATE: 18 BRPM | BODY MASS INDEX: 33.97 KG/M2 | DIASTOLIC BLOOD PRESSURE: 77 MMHG | WEIGHT: 199 LBS

## 2025-08-20 DIAGNOSIS — N20.0 KIDNEY STONE: ICD-10-CM

## 2025-08-20 DIAGNOSIS — N20.0 KIDNEY STONE: Primary | ICD-10-CM

## 2025-08-20 LAB
BILIRUB SERPL-MCNC: NORMAL MG/DL
BLOOD URINE, POC: NORMAL
COLOR, POC UA: NORMAL
GLUCOSE UR QL STRIP: NORMAL
KETONES UR QL STRIP: NORMAL
LEUKOCYTE ESTERASE URINE, POC: NORMAL
NITRITE, POC UA: NORMAL
PH, POC UA: 5.5
PROTEIN, POC: NORMAL
SPECIFIC GRAVITY, POC UA: >=1.03
UROBILINOGEN, POC UA: 0.2

## 2025-08-20 PROCEDURE — 3066F NEPHROPATHY DOC TX: CPT | Mod: CPTII,,, | Performed by: NURSE PRACTITIONER

## 2025-08-20 PROCEDURE — 99214 OFFICE O/P EST MOD 30 MIN: CPT | Mod: PBBFAC,25 | Performed by: NURSE PRACTITIONER

## 2025-08-20 PROCEDURE — 3008F BODY MASS INDEX DOCD: CPT | Mod: CPTII,,, | Performed by: NURSE PRACTITIONER

## 2025-08-20 PROCEDURE — 1159F MED LIST DOCD IN RCRD: CPT | Mod: CPTII,,, | Performed by: NURSE PRACTITIONER

## 2025-08-20 PROCEDURE — 3044F HG A1C LEVEL LT 7.0%: CPT | Mod: CPTII,,, | Performed by: NURSE PRACTITIONER

## 2025-08-20 PROCEDURE — 74018 RADEX ABDOMEN 1 VIEW: CPT | Mod: TC

## 2025-08-20 PROCEDURE — 99213 OFFICE O/P EST LOW 20 MIN: CPT | Mod: S$PBB,,, | Performed by: NURSE PRACTITIONER

## 2025-08-20 PROCEDURE — 81001 URINALYSIS AUTO W/SCOPE: CPT | Mod: PBBFAC | Performed by: NURSE PRACTITIONER

## 2025-08-20 PROCEDURE — 3074F SYST BP LT 130 MM HG: CPT | Mod: CPTII,,, | Performed by: NURSE PRACTITIONER

## 2025-08-20 PROCEDURE — 3061F NEG MICROALBUMINURIA REV: CPT | Mod: CPTII,,, | Performed by: NURSE PRACTITIONER

## 2025-08-20 PROCEDURE — 1160F RVW MEDS BY RX/DR IN RCRD: CPT | Mod: CPTII,,, | Performed by: NURSE PRACTITIONER

## 2025-08-20 PROCEDURE — 3078F DIAST BP <80 MM HG: CPT | Mod: CPTII,,, | Performed by: NURSE PRACTITIONER

## 2025-08-25 DIAGNOSIS — E89.0 HYPOTHYROIDISM, POSTSURGICAL: ICD-10-CM

## 2025-08-25 DIAGNOSIS — E11.9 TYPE 2 DIABETES MELLITUS WITHOUT COMPLICATION, WITHOUT LONG-TERM CURRENT USE OF INSULIN: ICD-10-CM

## 2025-08-25 DIAGNOSIS — E55.9 VITAMIN D DEFICIENCY: ICD-10-CM

## 2025-08-25 DIAGNOSIS — C73 THYROID CANCER: ICD-10-CM

## 2025-08-28 ENCOUNTER — OFFICE VISIT (OUTPATIENT)
Dept: ENDOCRINOLOGY | Facility: CLINIC | Age: 40
End: 2025-08-28
Payer: MEDICAID

## 2025-08-28 ENCOUNTER — CLINICAL SUPPORT (OUTPATIENT)
Dept: ENDOCRINOLOGY | Facility: CLINIC | Age: 40
End: 2025-08-28
Payer: MEDICAID

## 2025-08-28 ENCOUNTER — LAB VISIT (OUTPATIENT)
Dept: LAB | Facility: HOSPITAL | Age: 40
End: 2025-08-28
Attending: STUDENT IN AN ORGANIZED HEALTH CARE EDUCATION/TRAINING PROGRAM
Payer: MEDICAID

## 2025-08-28 VITALS
HEART RATE: 79 BPM | DIASTOLIC BLOOD PRESSURE: 70 MMHG | HEIGHT: 64 IN | BODY MASS INDEX: 34.21 KG/M2 | WEIGHT: 200.38 LBS | RESPIRATION RATE: 16 BRPM | SYSTOLIC BLOOD PRESSURE: 108 MMHG | TEMPERATURE: 98 F

## 2025-08-28 DIAGNOSIS — C73 THYROID CANCER: ICD-10-CM

## 2025-08-28 DIAGNOSIS — Z79.4 TYPE 2 DIABETES MELLITUS WITHOUT COMPLICATION, WITH LONG-TERM CURRENT USE OF INSULIN: ICD-10-CM

## 2025-08-28 DIAGNOSIS — E55.9 VITAMIN D DEFICIENCY: ICD-10-CM

## 2025-08-28 DIAGNOSIS — E89.0 POSTSURGICAL HYPOTHYROIDISM: ICD-10-CM

## 2025-08-28 DIAGNOSIS — E11.9 TYPE 2 DIABETES MELLITUS WITHOUT COMPLICATION, WITH LONG-TERM CURRENT USE OF INSULIN: ICD-10-CM

## 2025-08-28 DIAGNOSIS — Z79.4 TYPE 2 DIABETES MELLITUS WITHOUT COMPLICATION, WITH LONG-TERM CURRENT USE OF INSULIN: Primary | ICD-10-CM

## 2025-08-28 DIAGNOSIS — E11.9 TYPE 2 DIABETES MELLITUS WITHOUT COMPLICATION, WITH LONG-TERM CURRENT USE OF INSULIN: Primary | ICD-10-CM

## 2025-08-28 LAB
25(OH)D3+25(OH)D2 SERPL-MCNC: 30 NG/ML (ref 30–80)
EST. AVERAGE GLUCOSE BLD GHB EST-MCNC: 131.2 MG/DL
HBA1C MFR BLD: 6.2 %
T4 FREE SERPL-MCNC: 1.3 NG/DL (ref 0.7–1.48)
TSH SERPL-ACNC: <0.008 UIU/ML (ref 0.35–4.94)

## 2025-08-28 PROCEDURE — 99214 OFFICE O/P EST MOD 30 MIN: CPT | Mod: PBBFAC | Performed by: STUDENT IN AN ORGANIZED HEALTH CARE EDUCATION/TRAINING PROGRAM

## 2025-08-28 PROCEDURE — 82306 VITAMIN D 25 HYDROXY: CPT

## 2025-08-28 PROCEDURE — 84439 ASSAY OF FREE THYROXINE: CPT

## 2025-08-28 PROCEDURE — 92228 IMG RTA DETC/MNTR DS PHY/QHP: CPT | Mod: PBBFAC

## 2025-08-28 PROCEDURE — 92228 IMG RTA DETC/MNTR DS PHY/QHP: CPT | Mod: PBBFAC | Performed by: STUDENT IN AN ORGANIZED HEALTH CARE EDUCATION/TRAINING PROGRAM

## 2025-08-28 PROCEDURE — 83036 HEMOGLOBIN GLYCOSYLATED A1C: CPT

## 2025-08-28 PROCEDURE — 84432 ASSAY OF THYROGLOBULIN: CPT

## 2025-08-28 PROCEDURE — 84443 ASSAY THYROID STIM HORMONE: CPT

## 2025-08-28 PROCEDURE — 36415 COLL VENOUS BLD VENIPUNCTURE: CPT

## 2025-08-28 RX ORDER — METFORMIN HYDROCHLORIDE 500 MG/1
2000 TABLET, EXTENDED RELEASE ORAL NIGHTLY
Qty: 360 TABLET | Refills: 1 | Status: SHIPPED | OUTPATIENT
Start: 2025-08-28

## 2025-08-28 RX ORDER — METHOCARBAMOL 500 MG/1
500 TABLET, FILM COATED ORAL 3 TIMES DAILY
COMMUNITY
Start: 2025-08-26 | End: 2025-09-02

## 2025-08-28 RX ORDER — BLOOD-GLUCOSE SENSOR
1 EACH MISCELLANEOUS
Qty: 3 EACH | Refills: 6 | Status: SHIPPED | OUTPATIENT
Start: 2025-08-28 | End: 2026-08-28

## 2025-08-28 RX ORDER — DICLOFENAC SODIUM 75 MG/1
75 TABLET, DELAYED RELEASE ORAL 2 TIMES DAILY
COMMUNITY
Start: 2025-08-26 | End: 2025-09-02

## 2025-08-28 RX ORDER — LEVOTHYROXINE SODIUM 150 UG/1
150 TABLET ORAL
Qty: 30 TABLET | Refills: 3 | Status: SHIPPED | OUTPATIENT
Start: 2025-08-28

## 2025-08-29 ENCOUNTER — RESULTS FOLLOW-UP (OUTPATIENT)
Dept: INTERNAL MEDICINE | Facility: CLINIC | Age: 40
End: 2025-08-29
Payer: MEDICAID

## 2025-08-29 DIAGNOSIS — E55.9 VITAMIN D DEFICIENCY: ICD-10-CM

## 2025-08-29 LAB
ENDOCRINOLOGIST REVIEW: NORMAL
THYROGLOB AB SERPL IA-ACNC: <1.8 IU/ML
THYROGLOB SERPL-MCNC: 0.3 NG/ML

## 2025-09-02 RX ORDER — CHOLECALCIFEROL (VITAMIN D3) 50 MCG
2000 TABLET ORAL DAILY
Qty: 30 TABLET | Refills: 5 | Status: SHIPPED | OUTPATIENT
Start: 2025-09-02

## (undated) DEVICE — SUT ETHILON 4-0 BLK MONO

## (undated) DEVICE — GLOVE PROTEXIS BLUE LATEX 7

## (undated) DEVICE — DRAPE HAND STERILE

## (undated) DEVICE — PADDING 4X4YD SPECIALIST100

## (undated) DEVICE — CORD BIPOLAR 12 FOOT

## (undated) DEVICE — SYR 10CC LUER LOCK

## (undated) DEVICE — ALCOHOL ISOPROPYL BLU 70% 16OZ

## (undated) DEVICE — GAUZE SPONGE 4X4 12PLY

## (undated) DEVICE — GLOVE PROTEXIS BLUE LATEX 7.5

## (undated) DEVICE — DRESSING GAUZE XEROFORM 5X9

## (undated) DEVICE — GOWN POLY REINF BRTH SLV XL

## (undated) DEVICE — HANDLE DEVON RIGID OR LIGHT

## (undated) DEVICE — GLOVE PROTEXIS HYDROGEL SZ7.5

## (undated) DEVICE — NDL SAFETY 25G X 1.5 ECLIPSE

## (undated) DEVICE — GLOVE PROTEXIS NEOPRENE 7.5

## (undated) DEVICE — GLOVE PROTEXIS HYDROGEL SZ8

## (undated) DEVICE — Device